# Patient Record
Sex: FEMALE | Race: WHITE | NOT HISPANIC OR LATINO | Employment: OTHER | URBAN - METROPOLITAN AREA
[De-identification: names, ages, dates, MRNs, and addresses within clinical notes are randomized per-mention and may not be internally consistent; named-entity substitution may affect disease eponyms.]

---

## 2023-03-07 DIAGNOSIS — J98.01 BRONCHOSPASM: Primary | ICD-10-CM

## 2023-03-07 RX ORDER — ALBUTEROL SULFATE 90 UG/1
2 AEROSOL, METERED RESPIRATORY (INHALATION) EVERY 6 HOURS PRN
COMMUNITY
Start: 2016-03-21 | End: 2023-03-07 | Stop reason: SDUPTHER

## 2023-03-08 RX ORDER — ALBUTEROL SULFATE 90 UG/1
2 AEROSOL, METERED RESPIRATORY (INHALATION) EVERY 6 HOURS PRN
Qty: 18 G | Refills: 0 | Status: SHIPPED | OUTPATIENT
Start: 2023-03-08 | End: 2023-03-09 | Stop reason: SDUPTHER

## 2023-03-09 DIAGNOSIS — J98.01 BRONCHOSPASM: ICD-10-CM

## 2023-03-09 PROBLEM — R91.8 PULMONARY NODULES: Status: ACTIVE | Noted: 2023-03-09

## 2023-03-09 PROBLEM — R49.0 HOARSENESS: Status: ACTIVE | Noted: 2023-03-09

## 2023-03-09 PROBLEM — E78.5 HYPERLIPIDEMIA: Status: ACTIVE | Noted: 2023-03-09

## 2023-03-09 PROBLEM — J44.9 COPD (CHRONIC OBSTRUCTIVE PULMONARY DISEASE) (MULTI): Status: ACTIVE | Noted: 2023-03-09

## 2023-03-09 PROBLEM — S99.921A TOE INJURY, RIGHT, INITIAL ENCOUNTER: Status: ACTIVE | Noted: 2023-03-09

## 2023-03-09 PROBLEM — S92.502A FRACTURE OF SECOND TOE, LEFT, CLOSED, INITIAL ENCOUNTER: Status: ACTIVE | Noted: 2023-03-09

## 2023-03-09 PROBLEM — F41.9 ANXIETY: Status: ACTIVE | Noted: 2023-03-09

## 2023-03-09 PROBLEM — I10 BENIGN ESSENTIAL HYPERTENSION: Status: ACTIVE | Noted: 2023-03-09

## 2023-03-09 RX ORDER — AMLODIPINE BESYLATE 2.5 MG/1
1 TABLET ORAL DAILY
COMMUNITY
Start: 2022-09-19 | End: 2023-06-06 | Stop reason: SDUPTHER

## 2023-03-09 RX ORDER — CITALOPRAM 20 MG/1
1 TABLET, FILM COATED ORAL DAILY
COMMUNITY
Start: 2017-12-04 | End: 2023-06-06 | Stop reason: SDUPTHER

## 2023-03-09 RX ORDER — CEFDINIR 300 MG/1
CAPSULE ORAL
COMMUNITY
Start: 2023-01-13 | End: 2023-06-06 | Stop reason: ALTCHOICE

## 2023-03-09 RX ORDER — DILTIAZEM HYDROCHLORIDE 120 MG/1
1 CAPSULE, EXTENDED RELEASE ORAL DAILY
COMMUNITY
Start: 2022-06-08 | End: 2023-06-06 | Stop reason: ALTCHOICE

## 2023-03-09 RX ORDER — BUDESONIDE AND FORMOTEROL FUMARATE DIHYDRATE 160; 4.5 UG/1; UG/1
AEROSOL RESPIRATORY (INHALATION) 2 TIMES DAILY
COMMUNITY
Start: 2016-03-21 | End: 2023-06-06 | Stop reason: SDUPTHER

## 2023-03-09 RX ORDER — LISINOPRIL 40 MG/1
1 TABLET ORAL DAILY
COMMUNITY
Start: 2020-02-07 | End: 2023-06-06 | Stop reason: SDUPTHER

## 2023-03-09 RX ORDER — PREDNISONE 20 MG/1
TABLET ORAL
COMMUNITY
Start: 2023-01-13 | End: 2023-06-06 | Stop reason: ALTCHOICE

## 2023-03-09 RX ORDER — LEVOCETIRIZINE DIHYDROCHLORIDE 5 MG/1
1 TABLET, FILM COATED ORAL DAILY
COMMUNITY
Start: 2020-12-08 | End: 2023-06-06 | Stop reason: ALTCHOICE

## 2023-03-09 RX ORDER — VIT C/E/ZN/COPPR/LUTEIN/ZEAXAN 250MG-90MG
CAPSULE ORAL
COMMUNITY
Start: 2020-12-08 | End: 2023-06-06 | Stop reason: ALTCHOICE

## 2023-03-11 RX ORDER — ALBUTEROL SULFATE 90 UG/1
2 AEROSOL, METERED RESPIRATORY (INHALATION) EVERY 6 HOURS PRN
Qty: 18 G | Refills: 2 | Status: SHIPPED | OUTPATIENT
Start: 2023-03-11 | End: 2023-05-14

## 2023-05-08 RX ORDER — AMLODIPINE BESYLATE 2.5 MG/1
TABLET ORAL
Qty: 100 TABLET | Refills: 2 | OUTPATIENT
Start: 2023-05-08

## 2023-05-14 DIAGNOSIS — J98.01 BRONCHOSPASM: ICD-10-CM

## 2023-05-14 RX ORDER — ALBUTEROL SULFATE 90 UG/1
AEROSOL, METERED RESPIRATORY (INHALATION)
Qty: 51 G | Refills: 2 | Status: SHIPPED | OUTPATIENT
Start: 2023-05-14 | End: 2023-06-06 | Stop reason: SDUPTHER

## 2023-06-06 ENCOUNTER — OFFICE VISIT (OUTPATIENT)
Dept: PRIMARY CARE | Facility: CLINIC | Age: 73
End: 2023-06-06
Payer: MEDICARE

## 2023-06-06 VITALS
SYSTOLIC BLOOD PRESSURE: 153 MMHG | HEART RATE: 87 BPM | BODY MASS INDEX: 21.05 KG/M2 | WEIGHT: 131 LBS | TEMPERATURE: 98.1 F | OXYGEN SATURATION: 90 % | HEIGHT: 66 IN | DIASTOLIC BLOOD PRESSURE: 79 MMHG

## 2023-06-06 DIAGNOSIS — J98.01 BRONCHOSPASM: ICD-10-CM

## 2023-06-06 DIAGNOSIS — F17.200 ENCOUNTER FOR SCREENING FOR MALIGNANT NEOPLASM OF LUNG IN CURRENT SMOKER WITH 30 PACK YEAR HISTORY OR GREATER: ICD-10-CM

## 2023-06-06 DIAGNOSIS — F41.9 ANXIETY: ICD-10-CM

## 2023-06-06 DIAGNOSIS — Z12.2 ENCOUNTER FOR SCREENING FOR MALIGNANT NEOPLASM OF LUNG IN CURRENT SMOKER WITH 30 PACK YEAR HISTORY OR GREATER: ICD-10-CM

## 2023-06-06 DIAGNOSIS — F17.200 SMOKER: ICD-10-CM

## 2023-06-06 DIAGNOSIS — I10 PRIMARY HYPERTENSION: ICD-10-CM

## 2023-06-06 DIAGNOSIS — Z00.00 ROUTINE GENERAL MEDICAL EXAMINATION AT HEALTH CARE FACILITY: Primary | ICD-10-CM

## 2023-06-06 PROCEDURE — 3078F DIAST BP <80 MM HG: CPT | Performed by: INTERNAL MEDICINE

## 2023-06-06 PROCEDURE — 1159F MED LIST DOCD IN RCRD: CPT | Performed by: INTERNAL MEDICINE

## 2023-06-06 PROCEDURE — 1170F FXNL STATUS ASSESSED: CPT | Performed by: INTERNAL MEDICINE

## 2023-06-06 PROCEDURE — G0439 PPPS, SUBSEQ VISIT: HCPCS | Performed by: INTERNAL MEDICINE

## 2023-06-06 PROCEDURE — 3077F SYST BP >= 140 MM HG: CPT | Performed by: INTERNAL MEDICINE

## 2023-06-06 RX ORDER — BUDESONIDE AND FORMOTEROL FUMARATE DIHYDRATE 160; 4.5 UG/1; UG/1
2 AEROSOL RESPIRATORY (INHALATION)
Qty: 3 EACH | Refills: 3 | Status: SHIPPED | OUTPATIENT
Start: 2023-06-06 | End: 2024-03-06 | Stop reason: ALTCHOICE

## 2023-06-06 RX ORDER — AMLODIPINE BESYLATE 2.5 MG/1
2.5 TABLET ORAL DAILY
Qty: 90 TABLET | Refills: 3 | Status: SHIPPED | OUTPATIENT
Start: 2023-06-06 | End: 2024-04-29

## 2023-06-06 RX ORDER — ALBUTEROL SULFATE 90 UG/1
AEROSOL, METERED RESPIRATORY (INHALATION)
Qty: 51 G | Refills: 3 | Status: SHIPPED | OUTPATIENT
Start: 2023-06-06 | End: 2023-10-29 | Stop reason: SDUPTHER

## 2023-06-06 RX ORDER — CITALOPRAM 20 MG/1
20 TABLET, FILM COATED ORAL DAILY
Qty: 90 TABLET | Refills: 3 | Status: SHIPPED | OUTPATIENT
Start: 2023-06-06 | End: 2024-06-02

## 2023-06-06 RX ORDER — LISINOPRIL 40 MG/1
40 TABLET ORAL DAILY
Qty: 90 TABLET | Refills: 3 | Status: SHIPPED | OUTPATIENT
Start: 2023-06-06 | End: 2024-04-29

## 2023-06-06 ASSESSMENT — ACTIVITIES OF DAILY LIVING (ADL)
BATHING: INDEPENDENT
GROCERY_SHOPPING: INDEPENDENT
DOING_HOUSEWORK: INDEPENDENT
TAKING_MEDICATION: INDEPENDENT
MANAGING_FINANCES: INDEPENDENT
DRESSING: INDEPENDENT

## 2023-06-06 ASSESSMENT — PATIENT HEALTH QUESTIONNAIRE - PHQ9
2. FEELING DOWN, DEPRESSED OR HOPELESS: NOT AT ALL
SUM OF ALL RESPONSES TO PHQ9 QUESTIONS 1 AND 2: 0
1. LITTLE INTEREST OR PLEASURE IN DOING THINGS: NOT AT ALL
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
## 2023-06-08 ENCOUNTER — TELEPHONE (OUTPATIENT)
Dept: PRIMARY CARE | Facility: CLINIC | Age: 73
End: 2023-06-08
Payer: MEDICARE

## 2023-06-08 DIAGNOSIS — Z87.891 PERSONAL HISTORY OF TOBACCO USE, PRESENTING HAZARDS TO HEALTH: ICD-10-CM

## 2023-06-08 DIAGNOSIS — F17.200 HEAVY SMOKER: Primary | ICD-10-CM

## 2023-06-08 DIAGNOSIS — Z12.2 ENCOUNTER FOR SCREENING FOR MALIGNANT NEOPLASM OF LUNG IN CURRENT SMOKER WITH 30 PACK YEAR HISTORY OR GREATER: ICD-10-CM

## 2023-06-08 DIAGNOSIS — F17.200 ENCOUNTER FOR SCREENING FOR MALIGNANT NEOPLASM OF LUNG IN CURRENT SMOKER WITH 30 PACK YEAR HISTORY OR GREATER: ICD-10-CM

## 2023-07-24 ENCOUNTER — TELEPHONE (OUTPATIENT)
Dept: PRIMARY CARE | Facility: CLINIC | Age: 73
End: 2023-07-24
Payer: MEDICARE

## 2023-07-24 NOTE — TELEPHONE ENCOUNTER
PT STATES THAT SHE HAD A CT DONE AND SHE WOULD LIKE THE RESULTS. SHE WANTED YOU TO KNOW THAT SHE HAVE A FEVER AND TIRED AND THINK SHE HAVE PNEUMONIA. THIS MESSAGE WAS FRIDAY. I CALLED AND SHE STATES THAT SHE IS BETTER AND SHE FEELS FINE SHE WAS IN THE GARDEN AND THE HEAT BUT AFTER SOME REST SHE FEELS BETTER

## 2023-10-27 ENCOUNTER — TELEPHONE (OUTPATIENT)
Dept: PRIMARY CARE | Facility: CLINIC | Age: 73
End: 2023-10-27
Payer: MEDICARE

## 2023-10-27 DIAGNOSIS — J98.01 BRONCHOSPASM: ICD-10-CM

## 2023-10-29 RX ORDER — ALBUTEROL SULFATE 90 UG/1
AEROSOL, METERED RESPIRATORY (INHALATION)
Qty: 51 G | Refills: 3 | Status: SHIPPED | OUTPATIENT
Start: 2023-10-29 | End: 2023-12-05 | Stop reason: SDUPTHER

## 2023-12-05 ENCOUNTER — OFFICE VISIT (OUTPATIENT)
Dept: PRIMARY CARE | Facility: CLINIC | Age: 73
End: 2023-12-05
Payer: MEDICARE

## 2023-12-05 VITALS
OXYGEN SATURATION: 91 % | SYSTOLIC BLOOD PRESSURE: 132 MMHG | HEART RATE: 85 BPM | BODY MASS INDEX: 20.72 KG/M2 | DIASTOLIC BLOOD PRESSURE: 65 MMHG | HEIGHT: 67 IN | TEMPERATURE: 98.4 F | WEIGHT: 132 LBS

## 2023-12-05 DIAGNOSIS — J43.9 PULMONARY EMPHYSEMA, UNSPECIFIED EMPHYSEMA TYPE (MULTI): Primary | ICD-10-CM

## 2023-12-05 DIAGNOSIS — I10 BENIGN ESSENTIAL HYPERTENSION: ICD-10-CM

## 2023-12-05 DIAGNOSIS — E78.5 HYPERLIPIDEMIA, UNSPECIFIED HYPERLIPIDEMIA TYPE: ICD-10-CM

## 2023-12-05 PROCEDURE — 99214 OFFICE O/P EST MOD 30 MIN: CPT | Performed by: INTERNAL MEDICINE

## 2023-12-05 PROCEDURE — 1159F MED LIST DOCD IN RCRD: CPT | Performed by: INTERNAL MEDICINE

## 2023-12-05 PROCEDURE — 3075F SYST BP GE 130 - 139MM HG: CPT | Performed by: INTERNAL MEDICINE

## 2023-12-05 PROCEDURE — 3078F DIAST BP <80 MM HG: CPT | Performed by: INTERNAL MEDICINE

## 2023-12-05 PROCEDURE — 1125F AMNT PAIN NOTED PAIN PRSNT: CPT | Performed by: INTERNAL MEDICINE

## 2023-12-05 RX ORDER — ALBUTEROL SULFATE 90 UG/1
2 AEROSOL, METERED RESPIRATORY (INHALATION) EVERY 6 HOURS PRN
Qty: 24 G | Refills: 3 | Status: SHIPPED | OUTPATIENT
Start: 2023-12-05 | End: 2024-03-06 | Stop reason: WASHOUT

## 2023-12-05 RX ORDER — ALBUTEROL SULFATE 90 UG/1
2 AEROSOL, METERED RESPIRATORY (INHALATION) EVERY 6 HOURS PRN
Qty: 8 G | Refills: 1 | Status: SHIPPED | OUTPATIENT
Start: 2023-12-05 | End: 2024-03-06 | Stop reason: SDUPTHER

## 2023-12-05 NOTE — PROGRESS NOTES
"PCP: Krista Pantoja MD   I have reviewed existing histories, notes, past medical history, surgical history, social history, family history, med list, allergy list, and immunization, and updated.    HPI:   Follow up and refills  Needs albuterol inhaler to be changed to ProAir brand, because it is not helping at all  Pt has over 50Pack year smoking hx, and has not seen a pulmonologist yet for COPD.  Had CT low dose lung ca screening in June    Lab Results   Component Value Date    WBC 12.0 (H) 01/13/2023    HGB 14.0 01/13/2023    HCT 42.4 01/13/2023     01/13/2023    CHOL 258 (H) 06/08/2022    TRIG 147 05/19/2020    HDL 62.3 06/08/2022    LDLDIRECT 168 (H) 06/08/2022    ALT 8 01/11/2023    AST 15 01/11/2023     (L) 01/11/2023    K 4.6 01/11/2023    CL 95 (L) 01/11/2023    CREATININE 0.40 (L) 01/11/2023    BUN 9 01/11/2023    CO2 28 01/11/2023     Physical exam:  /65   Pulse 85   Temp 36.9 °C (98.4 °F)   Ht 1.702 m (5' 7\")   Wt 59.9 kg (132 lb)   SpO2 91%   BMI 20.67 kg/m²    Heart appears regular and some premature beats  Lungs mild wheezing noted. No rales  Pulse ox noted  No leg edema.    Assessments/orders:   1. Pulmonary emphysema, unspecified emphysema type (CMS/HCC)  albuterol (ProAir HFA) 90 mcg/actuation inhaler, albuterol (ProAir HFA) 90 mcg/actuation inhaler, Referral to Pulmonology      2. Hyperlipidemia, unspecified hyperlipidemia type        3. Benign essential hypertension        Pt declined all vaccines at this time. Declined all other preventive tests at this time.               "

## 2023-12-19 ENCOUNTER — APPOINTMENT (OUTPATIENT)
Dept: PULMONOLOGY | Facility: HOSPITAL | Age: 73
End: 2023-12-19
Payer: MEDICARE

## 2024-01-07 ENCOUNTER — APPOINTMENT (OUTPATIENT)
Dept: RADIOLOGY | Facility: HOSPITAL | Age: 74
DRG: 156 | End: 2024-01-07
Payer: MEDICARE

## 2024-01-07 ENCOUNTER — HOSPITAL ENCOUNTER (INPATIENT)
Facility: HOSPITAL | Age: 74
LOS: 3 days | Discharge: HOME | DRG: 156 | End: 2024-01-10
Attending: STUDENT IN AN ORGANIZED HEALTH CARE EDUCATION/TRAINING PROGRAM | Admitting: INTERNAL MEDICINE
Payer: MEDICARE

## 2024-01-07 DIAGNOSIS — K11.20 PAROTIDITIS: Primary | ICD-10-CM

## 2024-01-07 LAB
ALBUMIN SERPL BCP-MCNC: 4.4 G/DL (ref 3.4–5)
ALP SERPL-CCNC: 66 U/L (ref 33–136)
ALT SERPL W P-5'-P-CCNC: 12 U/L (ref 7–45)
ANION GAP SERPL CALC-SCNC: 13 MMOL/L (ref 10–20)
APPEARANCE UR: CLEAR
AST SERPL W P-5'-P-CCNC: 11 U/L (ref 9–39)
BASOPHILS # BLD AUTO: 0.04 X10*3/UL (ref 0–0.1)
BASOPHILS NFR BLD AUTO: 0.3 %
BILIRUB SERPL-MCNC: 0.6 MG/DL (ref 0–1.2)
BILIRUB UR STRIP.AUTO-MCNC: NEGATIVE MG/DL
BUN SERPL-MCNC: 7 MG/DL (ref 6–23)
CALCIUM SERPL-MCNC: 8.9 MG/DL (ref 8.6–10.3)
CHLORIDE SERPL-SCNC: 97 MMOL/L (ref 98–107)
CO2 SERPL-SCNC: 31 MMOL/L (ref 21–32)
COLOR UR: YELLOW
CREAT SERPL-MCNC: 0.41 MG/DL (ref 0.5–1.05)
CRP SERPL-MCNC: 0.46 MG/DL
EOSINOPHIL # BLD AUTO: 0.07 X10*3/UL (ref 0–0.4)
EOSINOPHIL NFR BLD AUTO: 0.5 %
ERYTHROCYTE [DISTWIDTH] IN BLOOD BY AUTOMATED COUNT: 12.7 % (ref 11.5–14.5)
ERYTHROCYTE [SEDIMENTATION RATE] IN BLOOD BY WESTERGREN METHOD: 18 MM/H (ref 0–30)
ETHANOL SERPL-MCNC: <10 MG/DL
GFR SERPL CREATININE-BSD FRML MDRD: >90 ML/MIN/1.73M*2
GLUCOSE SERPL-MCNC: 87 MG/DL (ref 74–99)
GLUCOSE UR STRIP.AUTO-MCNC: NEGATIVE MG/DL
HCT VFR BLD AUTO: 46.5 % (ref 36–46)
HGB BLD-MCNC: 16 G/DL (ref 12–16)
HOLD SPECIMEN: 293
IMM GRANULOCYTES # BLD AUTO: 0.07 X10*3/UL (ref 0–0.5)
IMM GRANULOCYTES NFR BLD AUTO: 0.5 % (ref 0–0.9)
KETONES UR STRIP.AUTO-MCNC: ABNORMAL MG/DL
LACTATE SERPL-SCNC: 1.1 MMOL/L (ref 0.4–2)
LEUKOCYTE ESTERASE UR QL STRIP.AUTO: NEGATIVE
LYMPHOCYTES # BLD AUTO: 1.49 X10*3/UL (ref 0.8–3)
LYMPHOCYTES NFR BLD AUTO: 11.3 %
MCH RBC QN AUTO: 32.9 PG (ref 26–34)
MCHC RBC AUTO-ENTMCNC: 34.4 G/DL (ref 32–36)
MCV RBC AUTO: 96 FL (ref 80–100)
MONOCYTES # BLD AUTO: 1.16 X10*3/UL (ref 0.05–0.8)
MONOCYTES NFR BLD AUTO: 8.8 %
MUCOUS THREADS #/AREA URNS AUTO: NORMAL /LPF
NEUTROPHILS # BLD AUTO: 10.31 X10*3/UL (ref 1.6–5.5)
NEUTROPHILS NFR BLD AUTO: 78.6 %
NITRITE UR QL STRIP.AUTO: NEGATIVE
NRBC BLD-RTO: 0 /100 WBCS (ref 0–0)
PH UR STRIP.AUTO: 5 [PH]
PLATELET # BLD AUTO: 300 X10*3/UL (ref 150–450)
POTASSIUM SERPL-SCNC: 3.7 MMOL/L (ref 3.5–5.3)
PROT SERPL-MCNC: 7.7 G/DL (ref 6.4–8.2)
PROT UR STRIP.AUTO-MCNC: ABNORMAL MG/DL
RBC # BLD AUTO: 4.86 X10*6/UL (ref 4–5.2)
RBC # UR STRIP.AUTO: NEGATIVE /UL
RBC #/AREA URNS AUTO: NORMAL /HPF
SODIUM SERPL-SCNC: 137 MMOL/L (ref 136–145)
SP GR UR STRIP.AUTO: >1.06
SQUAMOUS #/AREA URNS AUTO: NORMAL /HPF
UROBILINOGEN UR STRIP.AUTO-MCNC: <2 MG/DL
WBC # BLD AUTO: 13.1 X10*3/UL (ref 4.4–11.3)
WBC #/AREA URNS AUTO: NORMAL /HPF

## 2024-01-07 PROCEDURE — 85652 RBC SED RATE AUTOMATED: CPT | Performed by: PHYSICIAN ASSISTANT

## 2024-01-07 PROCEDURE — 70491 CT SOFT TISSUE NECK W/DYE: CPT

## 2024-01-07 PROCEDURE — 1100000001 HC PRIVATE ROOM DAILY

## 2024-01-07 PROCEDURE — 2500000002 HC RX 250 W HCPCS SELF ADMINISTERED DRUGS (ALT 637 FOR MEDICARE OP, ALT 636 FOR OP/ED): Performed by: INTERNAL MEDICINE

## 2024-01-07 PROCEDURE — 87040 BLOOD CULTURE FOR BACTERIA: CPT | Mod: PORLAB | Performed by: PHYSICIAN ASSISTANT

## 2024-01-07 PROCEDURE — 70491 CT SOFT TISSUE NECK W/DYE: CPT | Performed by: RADIOLOGY

## 2024-01-07 PROCEDURE — 70486 CT MAXILLOFACIAL W/O DYE: CPT | Performed by: RADIOLOGY

## 2024-01-07 PROCEDURE — 84075 ASSAY ALKALINE PHOSPHATASE: CPT | Performed by: PHYSICIAN ASSISTANT

## 2024-01-07 PROCEDURE — 85025 COMPLETE CBC W/AUTO DIFF WBC: CPT | Performed by: PHYSICIAN ASSISTANT

## 2024-01-07 PROCEDURE — 83605 ASSAY OF LACTIC ACID: CPT | Performed by: PHYSICIAN ASSISTANT

## 2024-01-07 PROCEDURE — 70487 CT MAXILLOFACIAL W/DYE: CPT

## 2024-01-07 PROCEDURE — 96367 TX/PROPH/DG ADDL SEQ IV INF: CPT

## 2024-01-07 PROCEDURE — 2500000001 HC RX 250 WO HCPCS SELF ADMINISTERED DRUGS (ALT 637 FOR MEDICARE OP): Performed by: INTERNAL MEDICINE

## 2024-01-07 PROCEDURE — 96365 THER/PROPH/DIAG IV INF INIT: CPT

## 2024-01-07 PROCEDURE — 96375 TX/PRO/DX INJ NEW DRUG ADDON: CPT

## 2024-01-07 PROCEDURE — 36415 COLL VENOUS BLD VENIPUNCTURE: CPT | Performed by: PHYSICIAN ASSISTANT

## 2024-01-07 PROCEDURE — 2550000001 HC RX 255 CONTRASTS: Performed by: PHYSICIAN ASSISTANT

## 2024-01-07 PROCEDURE — 82077 ASSAY SPEC XCP UR&BREATH IA: CPT | Performed by: PHYSICIAN ASSISTANT

## 2024-01-07 PROCEDURE — 2500000004 HC RX 250 GENERAL PHARMACY W/ HCPCS (ALT 636 FOR OP/ED): Performed by: INTERNAL MEDICINE

## 2024-01-07 PROCEDURE — 99285 EMERGENCY DEPT VISIT HI MDM: CPT | Performed by: STUDENT IN AN ORGANIZED HEALTH CARE EDUCATION/TRAINING PROGRAM

## 2024-01-07 PROCEDURE — 99223 1ST HOSP IP/OBS HIGH 75: CPT | Performed by: INTERNAL MEDICINE

## 2024-01-07 PROCEDURE — 2500000004 HC RX 250 GENERAL PHARMACY W/ HCPCS (ALT 636 FOR OP/ED): Performed by: PHYSICIAN ASSISTANT

## 2024-01-07 PROCEDURE — 96376 TX/PRO/DX INJ SAME DRUG ADON: CPT

## 2024-01-07 PROCEDURE — 96374 THER/PROPH/DIAG INJ IV PUSH: CPT | Mod: 59

## 2024-01-07 PROCEDURE — 86140 C-REACTIVE PROTEIN: CPT | Performed by: PHYSICIAN ASSISTANT

## 2024-01-07 PROCEDURE — 81001 URINALYSIS AUTO W/SCOPE: CPT | Performed by: PHYSICIAN ASSISTANT

## 2024-01-07 PROCEDURE — S4991 NICOTINE PATCH NONLEGEND: HCPCS | Performed by: INTERNAL MEDICINE

## 2024-01-07 PROCEDURE — 94640 AIRWAY INHALATION TREATMENT: CPT

## 2024-01-07 RX ORDER — AMLODIPINE BESYLATE 2.5 MG/1
2.5 TABLET ORAL DAILY
Status: DISCONTINUED | OUTPATIENT
Start: 2024-01-07 | End: 2024-01-10 | Stop reason: HOSPADM

## 2024-01-07 RX ORDER — METRONIDAZOLE 500 MG/100ML
500 INJECTION, SOLUTION INTRAVENOUS EVERY 8 HOURS
Status: DISCONTINUED | OUTPATIENT
Start: 2024-01-07 | End: 2024-01-10 | Stop reason: HOSPADM

## 2024-01-07 RX ORDER — KETOROLAC TROMETHAMINE 30 MG/ML
15 INJECTION, SOLUTION INTRAMUSCULAR; INTRAVENOUS EVERY 6 HOURS
Status: COMPLETED | OUTPATIENT
Start: 2024-01-07 | End: 2024-01-08

## 2024-01-07 RX ORDER — IBUPROFEN 200 MG
1 TABLET ORAL DAILY
Status: DISCONTINUED | OUTPATIENT
Start: 2024-01-07 | End: 2024-01-10 | Stop reason: HOSPADM

## 2024-01-07 RX ORDER — CITALOPRAM 20 MG/1
20 TABLET, FILM COATED ORAL DAILY
Status: DISCONTINUED | OUTPATIENT
Start: 2024-01-07 | End: 2024-01-10 | Stop reason: HOSPADM

## 2024-01-07 RX ORDER — ONDANSETRON HYDROCHLORIDE 2 MG/ML
4 INJECTION, SOLUTION INTRAVENOUS EVERY 8 HOURS PRN
Status: DISCONTINUED | OUTPATIENT
Start: 2024-01-07 | End: 2024-01-10 | Stop reason: HOSPADM

## 2024-01-07 RX ORDER — KETOROLAC TROMETHAMINE 30 MG/ML
15 INJECTION, SOLUTION INTRAMUSCULAR; INTRAVENOUS ONCE
Status: COMPLETED | OUTPATIENT
Start: 2024-01-07 | End: 2024-01-07

## 2024-01-07 RX ORDER — LISINOPRIL 20 MG/1
40 TABLET ORAL DAILY
Status: DISCONTINUED | OUTPATIENT
Start: 2024-01-07 | End: 2024-01-10 | Stop reason: HOSPADM

## 2024-01-07 RX ORDER — ACETAMINOPHEN 160 MG/5ML
650 SOLUTION ORAL EVERY 4 HOURS PRN
Status: DISCONTINUED | OUTPATIENT
Start: 2024-01-07 | End: 2024-01-10 | Stop reason: HOSPADM

## 2024-01-07 RX ORDER — OXYCODONE HYDROCHLORIDE 5 MG/1
5 TABLET ORAL EVERY 4 HOURS PRN
Status: DISCONTINUED | OUTPATIENT
Start: 2024-01-07 | End: 2024-01-10 | Stop reason: HOSPADM

## 2024-01-07 RX ORDER — LORAZEPAM 2 MG/ML
1 INJECTION INTRAMUSCULAR EVERY 2 HOUR PRN
Status: DISCONTINUED | OUTPATIENT
Start: 2024-01-07 | End: 2024-01-10 | Stop reason: HOSPADM

## 2024-01-07 RX ORDER — CEFEPIME 1 G/50ML
2 INJECTION, SOLUTION INTRAVENOUS ONCE
Status: COMPLETED | OUTPATIENT
Start: 2024-01-07 | End: 2024-01-07

## 2024-01-07 RX ORDER — METRONIDAZOLE 500 MG/100ML
500 INJECTION, SOLUTION INTRAVENOUS ONCE
Status: COMPLETED | OUTPATIENT
Start: 2024-01-07 | End: 2024-01-07

## 2024-01-07 RX ORDER — TALC
3 POWDER (GRAM) TOPICAL DAILY
Status: DISCONTINUED | OUTPATIENT
Start: 2024-01-07 | End: 2024-01-10 | Stop reason: HOSPADM

## 2024-01-07 RX ORDER — ONDANSETRON 4 MG/1
4 TABLET, FILM COATED ORAL EVERY 8 HOURS PRN
Status: DISCONTINUED | OUTPATIENT
Start: 2024-01-07 | End: 2024-01-10 | Stop reason: HOSPADM

## 2024-01-07 RX ORDER — THIAMINE HYDROCHLORIDE 100 MG/ML
100 INJECTION, SOLUTION INTRAMUSCULAR; INTRAVENOUS DAILY
Status: COMPLETED | OUTPATIENT
Start: 2024-01-07 | End: 2024-01-09

## 2024-01-07 RX ORDER — ACETAMINOPHEN 650 MG/1
650 SUPPOSITORY RECTAL EVERY 4 HOURS PRN
Status: DISCONTINUED | OUTPATIENT
Start: 2024-01-07 | End: 2024-01-10 | Stop reason: HOSPADM

## 2024-01-07 RX ORDER — ACETAMINOPHEN 325 MG/1
650 TABLET ORAL EVERY 4 HOURS PRN
Status: DISCONTINUED | OUTPATIENT
Start: 2024-01-07 | End: 2024-01-10 | Stop reason: HOSPADM

## 2024-01-07 RX ORDER — CEFTRIAXONE 1 G/50ML
1 INJECTION, SOLUTION INTRAVENOUS EVERY 24 HOURS
Status: DISCONTINUED | OUTPATIENT
Start: 2024-01-07 | End: 2024-01-10 | Stop reason: HOSPADM

## 2024-01-07 RX ORDER — MORPHINE SULFATE 4 MG/ML
4 INJECTION, SOLUTION INTRAMUSCULAR; INTRAVENOUS ONCE
Status: COMPLETED | OUTPATIENT
Start: 2024-01-07 | End: 2024-01-07

## 2024-01-07 RX ORDER — ENOXAPARIN SODIUM 100 MG/ML
40 INJECTION SUBCUTANEOUS EVERY 24 HOURS
Status: DISCONTINUED | OUTPATIENT
Start: 2024-01-07 | End: 2024-01-10 | Stop reason: HOSPADM

## 2024-01-07 RX ORDER — FLUTICASONE FUROATE AND VILANTEROL 200; 25 UG/1; UG/1
1 POWDER RESPIRATORY (INHALATION)
Status: DISCONTINUED | OUTPATIENT
Start: 2024-01-07 | End: 2024-01-10 | Stop reason: HOSPADM

## 2024-01-07 RX ORDER — LORAZEPAM 2 MG/ML
0.5 INJECTION INTRAMUSCULAR EVERY 2 HOUR PRN
Status: DISCONTINUED | OUTPATIENT
Start: 2024-01-07 | End: 2024-01-10 | Stop reason: HOSPADM

## 2024-01-07 RX ORDER — POLYETHYLENE GLYCOL 3350 17 G/17G
17 POWDER, FOR SOLUTION ORAL DAILY PRN
Status: DISCONTINUED | OUTPATIENT
Start: 2024-01-07 | End: 2024-01-10 | Stop reason: HOSPADM

## 2024-01-07 RX ORDER — OXYCODONE HYDROCHLORIDE 5 MG/1
7.5 TABLET ORAL EVERY 4 HOURS PRN
Status: DISCONTINUED | OUTPATIENT
Start: 2024-01-07 | End: 2024-01-08

## 2024-01-07 RX ADMIN — Medication 3 MG: at 19:30

## 2024-01-07 RX ADMIN — FLUTICASONE FUROATE AND VILANTEROL TRIFENATATE 1 PUFF: 200; 25 POWDER RESPIRATORY (INHALATION) at 22:23

## 2024-01-07 RX ADMIN — METRONIDAZOLE 500 MG: 500 INJECTION, SOLUTION INTRAVENOUS at 20:45

## 2024-01-07 RX ADMIN — METRONIDAZOLE 500 MG: 500 INJECTION, SOLUTION INTRAVENOUS at 12:40

## 2024-01-07 RX ADMIN — LORAZEPAM 1 MG: 2 INJECTION, SOLUTION INTRAMUSCULAR; INTRAVENOUS at 11:19

## 2024-01-07 RX ADMIN — KETOROLAC TROMETHAMINE 15 MG: 30 INJECTION, SOLUTION INTRAMUSCULAR at 19:30

## 2024-01-07 RX ADMIN — HYDROMORPHONE HYDROCHLORIDE 0.5 MG: 0.5 INJECTION, SOLUTION INTRAMUSCULAR; INTRAVENOUS; SUBCUTANEOUS at 13:22

## 2024-01-07 RX ADMIN — CITALOPRAM HYDROBROMIDE 20 MG: 20 TABLET ORAL at 19:30

## 2024-01-07 RX ADMIN — THIAMINE HYDROCHLORIDE 100 MG: 100 INJECTION, SOLUTION INTRAMUSCULAR; INTRAVENOUS at 09:00

## 2024-01-07 RX ADMIN — KETOROLAC TROMETHAMINE 15 MG: 30 INJECTION, SOLUTION INTRAMUSCULAR at 13:22

## 2024-01-07 RX ADMIN — LISINOPRIL 40 MG: 20 TABLET ORAL at 19:30

## 2024-01-07 RX ADMIN — NICOTINE 1 PATCH: 14 PATCH, EXTENDED RELEASE TRANSDERMAL at 14:25

## 2024-01-07 RX ADMIN — KETOROLAC TROMETHAMINE 15 MG: 30 INJECTION, SOLUTION INTRAMUSCULAR at 07:09

## 2024-01-07 RX ADMIN — AMLODIPINE BESYLATE 2.5 MG: 2.5 TABLET ORAL at 19:30

## 2024-01-07 RX ADMIN — IOHEXOL 75 ML: 350 INJECTION, SOLUTION INTRAVENOUS at 10:28

## 2024-01-07 RX ADMIN — MORPHINE SULFATE 4 MG: 4 INJECTION, SOLUTION INTRAMUSCULAR; INTRAVENOUS at 07:09

## 2024-01-07 RX ADMIN — CEFEPIME 2 G: 1 INJECTION, SOLUTION INTRAVENOUS at 14:20

## 2024-01-07 RX ADMIN — CEFTRIAXONE SODIUM 1 G: 1 INJECTION, SOLUTION INTRAVENOUS at 19:31

## 2024-01-07 SDOH — SOCIAL STABILITY: SOCIAL INSECURITY: WITHIN THE LAST YEAR, HAVE YOU BEEN AFRAID OF YOUR PARTNER OR EX-PARTNER?: PATIENT DECLINED

## 2024-01-07 SDOH — SOCIAL STABILITY: SOCIAL NETWORK: IN A TYPICAL WEEK, HOW MANY TIMES DO YOU TALK ON THE PHONE WITH FAMILY, FRIENDS, OR NEIGHBORS?: PATIENT DECLINED

## 2024-01-07 SDOH — SOCIAL STABILITY: SOCIAL NETWORK
DO YOU BELONG TO ANY CLUBS OR ORGANIZATIONS SUCH AS CHURCH GROUPS UNIONS, FRATERNAL OR ATHLETIC GROUPS, OR SCHOOL GROUPS?: PATIENT DECLINED

## 2024-01-07 SDOH — SOCIAL STABILITY: SOCIAL INSECURITY
WITHIN THE LAST YEAR, HAVE YOU BEEN KICKED, HIT, SLAPPED, OR OTHERWISE PHYSICALLY HURT BY YOUR PARTNER OR EX-PARTNER?: PATIENT DECLINED

## 2024-01-07 SDOH — SOCIAL STABILITY: SOCIAL INSECURITY: HAVE YOU HAD THOUGHTS OF HARMING ANYONE ELSE?: NO

## 2024-01-07 SDOH — SOCIAL STABILITY: SOCIAL NETWORK: HOW OFTEN DO YOU ATTENT MEETINGS OF THE CLUB OR ORGANIZATION YOU BELONG TO?: PATIENT DECLINED

## 2024-01-07 SDOH — HEALTH STABILITY: MENTAL HEALTH: HOW OFTEN DO YOU HAVE 6 OR MORE DRINKS ON ONE OCCASION?: MONTHLY

## 2024-01-07 SDOH — SOCIAL STABILITY: SOCIAL INSECURITY
WITHIN THE LAST YEAR, HAVE TO BEEN RAPED OR FORCED TO HAVE ANY KIND OF SEXUAL ACTIVITY BY YOUR PARTNER OR EX-PARTNER?: PATIENT DECLINED

## 2024-01-07 SDOH — ECONOMIC STABILITY: FOOD INSECURITY: WITHIN THE PAST 12 MONTHS, THE FOOD YOU BOUGHT JUST DIDN'T LAST AND YOU DIDN'T HAVE MONEY TO GET MORE.: PATIENT DECLINED

## 2024-01-07 SDOH — SOCIAL STABILITY: SOCIAL INSECURITY: WERE YOU ABLE TO COMPLETE ALL THE BEHAVIORAL HEALTH SCREENINGS?: YES

## 2024-01-07 SDOH — SOCIAL STABILITY: SOCIAL INSECURITY: ARE YOU OR HAVE YOU BEEN THREATENED OR ABUSED PHYSICALLY, EMOTIONALLY, OR SEXUALLY BY ANYONE?: NO

## 2024-01-07 SDOH — SOCIAL STABILITY: SOCIAL INSECURITY: DO YOU FEEL UNSAFE GOING BACK TO THE PLACE WHERE YOU ARE LIVING?: NO

## 2024-01-07 SDOH — HEALTH STABILITY: MENTAL HEALTH: HOW OFTEN DO YOU HAVE A DRINK CONTAINING ALCOHOL?: 4 OR MORE TIMES A WEEK

## 2024-01-07 SDOH — HEALTH STABILITY: MENTAL HEALTH: HOW MANY STANDARD DRINKS CONTAINING ALCOHOL DO YOU HAVE ON A TYPICAL DAY?: 3 OR 4

## 2024-01-07 SDOH — HEALTH STABILITY: PHYSICAL HEALTH: ON AVERAGE, HOW MANY MINUTES DO YOU ENGAGE IN EXERCISE AT THIS LEVEL?: 0 MIN

## 2024-01-07 SDOH — SOCIAL STABILITY: SOCIAL INSECURITY
WITHIN THE LAST YEAR, HAVE YOU BEEN HUMILIATED OR EMOTIONALLY ABUSED IN OTHER WAYS BY YOUR PARTNER OR EX-PARTNER?: PATIENT DECLINED

## 2024-01-07 SDOH — SOCIAL STABILITY: SOCIAL INSECURITY: DO YOU FEEL ANYONE HAS EXPLOITED OR TAKEN ADVANTAGE OF YOU FINANCIALLY OR OF YOUR PERSONAL PROPERTY?: NO

## 2024-01-07 SDOH — SOCIAL STABILITY: SOCIAL INSECURITY: ABUSE: ADULT

## 2024-01-07 SDOH — SOCIAL STABILITY: SOCIAL NETWORK: HOW OFTEN DO YOU ATTEND CHURCH OR RELIGIOUS SERVICES?: PATIENT DECLINED

## 2024-01-07 SDOH — SOCIAL STABILITY: SOCIAL INSECURITY: ARE THERE ANY APPARENT SIGNS OF INJURIES/BEHAVIORS THAT COULD BE RELATED TO ABUSE/NEGLECT?: NO

## 2024-01-07 SDOH — HEALTH STABILITY: PHYSICAL HEALTH: ON AVERAGE, HOW MANY DAYS PER WEEK DO YOU ENGAGE IN MODERATE TO STRENUOUS EXERCISE (LIKE A BRISK WALK)?: 0 DAYS

## 2024-01-07 SDOH — SOCIAL STABILITY: SOCIAL NETWORK: ARE YOU MARRIED, WIDOWED, DIVORCED, SEPARATED, NEVER MARRIED, OR LIVING WITH A PARTNER?: PATIENT DECLINED

## 2024-01-07 SDOH — SOCIAL STABILITY: SOCIAL NETWORK: HOW OFTEN DO YOU GET TOGETHER WITH FRIENDS OR RELATIVES?: PATIENT DECLINED

## 2024-01-07 SDOH — SOCIAL STABILITY: SOCIAL INSECURITY: HAS ANYONE EVER THREATENED TO HURT YOUR FAMILY OR YOUR PETS?: NO

## 2024-01-07 SDOH — ECONOMIC STABILITY: FOOD INSECURITY: WITHIN THE PAST 12 MONTHS, YOU WORRIED THAT YOUR FOOD WOULD RUN OUT BEFORE YOU GOT MONEY TO BUY MORE.: PATIENT DECLINED

## 2024-01-07 SDOH — HEALTH STABILITY: MENTAL HEALTH
STRESS IS WHEN SOMEONE FEELS TENSE, NERVOUS, ANXIOUS, OR CAN'T SLEEP AT NIGHT BECAUSE THEIR MIND IS TROUBLED. HOW STRESSED ARE YOU?: PATIENT DECLINED

## 2024-01-07 SDOH — ECONOMIC STABILITY: INCOME INSECURITY
IN THE PAST 12 MONTHS, HAS THE ELECTRIC, GAS, OIL, OR WATER COMPANY THREATENED TO SHUT OFF SERVICE IN YOUR HOME?: PATIENT DECLINED

## 2024-01-07 SDOH — SOCIAL STABILITY: SOCIAL INSECURITY: DOES ANYONE TRY TO KEEP YOU FROM HAVING/CONTACTING OTHER FRIENDS OR DOING THINGS OUTSIDE YOUR HOME?: NO

## 2024-01-07 ASSESSMENT — LIFESTYLE VARIABLES
HOW MANY STANDARD DRINKS CONTAINING ALCOHOL DO YOU HAVE ON A TYPICAL DAY: 3 OR 4
HOW OFTEN DURING THE LAST YEAR HAVE YOU HAD A FEELING OF GUILT OR REMORSE AFTER DRINKING: NEVER
ORIENTATION AND CLOUDING OF SENSORIUM: ORIENTED AND CAN DO SERIAL ADDITIONS
PAROXYSMAL SWEATS: NO SWEAT VISIBLE
EVER FELT BAD OR GUILTY ABOUT YOUR DRINKING: YES
NAUSEA AND VOMITING: NO NAUSEA AND NO VOMITING
NAUSEA AND VOMITING: NO NAUSEA AND NO VOMITING
AUDITORY DISTURBANCES: NOT PRESENT
EVER HAD A DRINK FIRST THING IN THE MORNING TO STEADY YOUR NERVES TO GET RID OF A HANGOVER: NO
TOTAL SCORE: 0
TREMOR: NO TREMOR
HAS A RELATIVE, FRIEND, DOCTOR, OR ANOTHER HEALTH PROFESSIONAL EXPRESSED CONCERN ABOUT YOUR DRINKING OR SUGGESTED YOU CUT DOWN: YES, BUT NOT IN THE LAST YEAR
TOTAL SCORE: 8
VISUAL DISTURBANCES: NOT PRESENT
HEADACHE, FULLNESS IN HEAD: VERY MILD
VISUAL DISTURBANCES: NOT PRESENT
ANXIETY: NO ANXIETY, AT EASE
VISUAL DISTURBANCES: NOT PRESENT
HEADACHE, FULLNESS IN HEAD: NOT PRESENT
AUDITORY DISTURBANCES: NOT PRESENT
PAROXYSMAL SWEATS: NO SWEAT VISIBLE
VISUAL DISTURBANCES: NOT PRESENT
NAUSEA AND VOMITING: NO NAUSEA AND NO VOMITING
AGITATION: NORMAL ACTIVITY
REASON UNABLE TO ASSESS: NO
TOTAL SCORE: 8
PAROXYSMAL SWEATS: BARELY PERCEPTIBLE SWEATING, PALMS MOIST
TOTAL SCORE: 0
PAROXYSMAL SWEATS: NO SWEAT VISIBLE
ANXIETY: NO ANXIETY, AT EASE
HOW OFTEN DURING THE LAST YEAR HAVE YOU FAILED TO DO WHAT WAS NORMALLY EXPECTED FROM YOU BECAUSE OF DRINKING: NEVER
VISUAL DISTURBANCES: NOT PRESENT
HEADACHE, FULLNESS IN HEAD: NOT PRESENT
AUDITORY DISTURBANCES: NOT PRESENT
PAROXYSMAL SWEATS: NO SWEAT VISIBLE
TREMOR: NO TREMOR
HAVE YOU EVER FELT YOU SHOULD CUT DOWN ON YOUR DRINKING: YES
TREMOR: 3
AGITATION: NORMAL ACTIVITY
TREMOR: NO TREMOR
TOTAL SCORE: 0
ORIENTATION AND CLOUDING OF SENSORIUM: ORIENTED AND CAN DO SERIAL ADDITIONS
AGITATION: SOMEWHAT MORE THAN NORMAL ACTIVITY
NAUSEA AND VOMITING: NO NAUSEA AND NO VOMITING
AUDITORY DISTURBANCES: NOT PRESENT
TOTAL SCORE: 0
AGITATION: NORMAL ACTIVITY
ORIENTATION AND CLOUDING OF SENSORIUM: ORIENTED AND CAN DO SERIAL ADDITIONS
AGITATION: NORMAL ACTIVITY
ANXIETY: NO ANXIETY, AT EASE
TOTAL SCORE: 4
HEADACHE, FULLNESS IN HEAD: NOT PRESENT
ANXIETY: NO ANXIETY, AT EASE
VISUAL DISTURBANCES: NOT PRESENT
HEADACHE, FULLNESS IN HEAD: VERY MILD
NAUSEA AND VOMITING: MILD NAUSEA WITH NO VOMITING
SKIP TO QUESTIONS 9-10: 0
ORIENTATION AND CLOUDING OF SENSORIUM: ORIENTED AND CAN DO SERIAL ADDITIONS
ORIENTATION AND CLOUDING OF SENSORIUM: ORIENTED AND CAN DO SERIAL ADDITIONS
AUDITORY DISTURBANCES: NOT PRESENT
ORIENTATION AND CLOUDING OF SENSORIUM: ORIENTED AND CAN DO SERIAL ADDITIONS
AUDITORY DISTURBANCES: NOT PRESENT
AUDIT TOTAL SCORE: 9
ANXIETY: NO ANXIETY, AT EASE
AGITATION: NORMAL ACTIVITY
TREMOR: 3
HOW OFTEN DO YOU HAVE 6 OR MORE DRINKS ON ONE OCCASION: MONTHLY
SKIP TO QUESTIONS 9-10: 0
HAVE PEOPLE ANNOYED YOU BY CRITICIZING YOUR DRINKING: YES
HOW OFTEN DURING THE LAST YEAR HAVE YOU FOUND THAT YOU WERE NOT ABLE TO STOP DRINKING ONCE YOU HAD STARTED: NEVER
AUDIT-C TOTAL SCORE: 7
AGITATION: NORMAL ACTIVITY
NAUSEA AND VOMITING: MILD NAUSEA WITH NO VOMITING
AUDIT-C TOTAL SCORE: 7
NAUSEA AND VOMITING: MILD NAUSEA WITH NO VOMITING
AUDITORY DISTURBANCES: NOT PRESENT
ANXIETY: NO ANXIETY, AT EASE
HAVE YOU OR SOMEONE ELSE BEEN INJURED AS A RESULT OF YOUR DRINKING: NO
AGITATION: SOMEWHAT MORE THAN NORMAL ACTIVITY
PAROXYSMAL SWEATS: BARELY PERCEPTIBLE SWEATING, PALMS MOIST
ANXIETY: MILDLY ANXIOUS
AUDITORY DISTURBANCES: NOT PRESENT
VISUAL DISTURBANCES: NOT PRESENT
TREMOR: 3
AUDIT TOTAL SCORE: 2
HEADACHE, FULLNESS IN HEAD: NOT PRESENT
HEADACHE, FULLNESS IN HEAD: NOT PRESENT
TREMOR: NO TREMOR
TREMOR: 2
HOW OFTEN DURING THE LAST YEAR HAVE YOU NEEDED AN ALCOHOLIC DRINK FIRST THING IN THE MORNING TO GET YOURSELF GOING AFTER A NIGHT OF HEAVY DRINKING: NEVER
NAUSEA AND VOMITING: NO NAUSEA AND NO VOMITING
HOW OFTEN DURING THE LAST YEAR HAVE YOU BEEN UNABLE TO REMEMBER WHAT HAPPENED THE NIGHT BEFORE BECAUSE YOU HAD BEEN DRINKING: NEVER
ORIENTATION AND CLOUDING OF SENSORIUM: ORIENTED AND CAN DO SERIAL ADDITIONS
VISUAL DISTURBANCES: NOT PRESENT
ORIENTATION AND CLOUDING OF SENSORIUM: ORIENTED AND CAN DO SERIAL ADDITIONS
ANXIETY: MILDLY ANXIOUS
PAROXYSMAL SWEATS: NO SWEAT VISIBLE
AUDIT-C TOTAL SCORE: 7
HEADACHE, FULLNESS IN HEAD: NOT PRESENT
TOTAL SCORE: 2
PAROXYSMAL SWEATS: NO SWEAT VISIBLE
HOW OFTEN DO YOU HAVE A DRINK CONTAINING ALCOHOL: 4 OR MORE TIMES A WEEK

## 2024-01-07 ASSESSMENT — COGNITIVE AND FUNCTIONAL STATUS - GENERAL
CLIMB 3 TO 5 STEPS WITH RAILING: A LITTLE
DRESSING REGULAR LOWER BODY CLOTHING: A LITTLE
WALKING IN HOSPITAL ROOM: A LITTLE
MOBILITY SCORE: 22
DAILY ACTIVITIY SCORE: 23
CLIMB 3 TO 5 STEPS WITH RAILING: A LITTLE
PATIENT BASELINE BEDBOUND: NO
MOBILITY SCORE: 22
DAILY ACTIVITIY SCORE: 23
WALKING IN HOSPITAL ROOM: A LITTLE
DRESSING REGULAR LOWER BODY CLOTHING: A LITTLE

## 2024-01-07 ASSESSMENT — PAIN SCALES - GENERAL
PAINLEVEL_OUTOF10: 10 - WORST POSSIBLE PAIN
PAINLEVEL_OUTOF10: 9
PAINLEVEL_OUTOF10: 3
PAINLEVEL_OUTOF10: 9
PAINLEVEL_OUTOF10: 0 - NO PAIN
PAINLEVEL_OUTOF10: 9
PAINLEVEL_OUTOF10: 0 - NO PAIN

## 2024-01-07 ASSESSMENT — ACTIVITIES OF DAILY LIVING (ADL)
WALKS IN HOME: INDEPENDENT
BATHING: INDEPENDENT
FEEDING YOURSELF: INDEPENDENT
GROOMING: INDEPENDENT
ASSISTIVE_DEVICE: EYEGLASSES
ADEQUATE_TO_COMPLETE_ADL: NO
LACK_OF_TRANSPORTATION: PATIENT DECLINED
DRESSING YOURSELF: INDEPENDENT
HEARING - RIGHT EAR: FUNCTIONAL
PATIENT'S MEMORY ADEQUATE TO SAFELY COMPLETE DAILY ACTIVITIES?: YES
JUDGMENT_ADEQUATE_SAFELY_COMPLETE_DAILY_ACTIVITIES: YES
HEARING - LEFT EAR: FUNCTIONAL
TOILETING: INDEPENDENT

## 2024-01-07 ASSESSMENT — COLUMBIA-SUICIDE SEVERITY RATING SCALE - C-SSRS
6. HAVE YOU EVER DONE ANYTHING, STARTED TO DO ANYTHING, OR PREPARED TO DO ANYTHING TO END YOUR LIFE?: NO
2. HAVE YOU ACTUALLY HAD ANY THOUGHTS OF KILLING YOURSELF?: NO
1. IN THE PAST MONTH, HAVE YOU WISHED YOU WERE DEAD OR WISHED YOU COULD GO TO SLEEP AND NOT WAKE UP?: NO

## 2024-01-07 ASSESSMENT — PAIN - FUNCTIONAL ASSESSMENT
PAIN_FUNCTIONAL_ASSESSMENT: 0-10
PAIN_FUNCTIONAL_ASSESSMENT: 0-10

## 2024-01-07 ASSESSMENT — PATIENT HEALTH QUESTIONNAIRE - PHQ9
2. FEELING DOWN, DEPRESSED OR HOPELESS: NOT AT ALL
1. LITTLE INTEREST OR PLEASURE IN DOING THINGS: NOT AT ALL
SUM OF ALL RESPONSES TO PHQ9 QUESTIONS 1 & 2: 0

## 2024-01-07 NOTE — H&P
History Of Present Illness  Rosa Ch is a 73 y.o. female with a PMH of COPD, HLD and HTN presenting with face pain.     Patient complains of left facial swelling for the past week.  Patient was seen in urgent care 5 days ago for the swelling and given prescription for doxycycline and prednisone.  She says the pain and swelling have gotten worse most specifically over the last night.  Patient has associated fevers and chills.  Says the pain is 10 of 10, aching, persistent.  Denies any chest pain, shortness of breath, lightheadedness, dizziness, nausea, vomiting, diarrhea, constipation, dysuria, polyuria.  Patient has a baseline cough that is no worse than typical.        Past Medical History  She has a past medical history of COPD (chronic obstructive pulmonary disease) (CMS/HCC) and Hypertension.    Surgical History  She has a past surgical history that includes Tubal ligation (03/16/2016) and Facial cosmetic surgery.     Social History  She reports that she has been smoking cigarettes. She has been smoking an average of 1 pack per day. She has never used smokeless tobacco. She reports current alcohol use of about 12.0 standard drinks of alcohol per week. She reports that she does not use drugs.    Family History  Family History   Problem Relation Name Age of Onset    Breast cancer Mother      Other (bladder cancer) Mother      Hepatitis Sister      Arthritis Sister          Allergies  Peanut, Penicillins, and Sulfa (sulfonamide antibiotics)    Code Status  No Order     A Comprehensive greater than 10 system review of systems was carried out.  Pertinent positives and negatives are noted above.  Otherwise negative for contributory information.       Last Recorded Vitals  BP (!) 155/95   Pulse 96   Temp 37.4 °C (99.3 °F) (Temporal)   Resp 16   Wt 59.4 kg (131 lb)   SpO2 100%      Physical Exam  Constitutional:       Appearance: Normal appearance.   HENT:      Head: Normocephalic and atraumatic.       Comments: Edema and tenderness left maxillary and buccal region around the parotid location.  Mild trismus, not able to open mouth completely.  Head normocephalic atraumatic, extraocular movements intact, pupils equal round reactive to light, mucous membranes are moist and pink, normal facial symmetry. No pharyngeal erythema, edema, exudates. Uvula is midline with no stridor, drooling. No induration the floor of the mouth.     Mouth/Throat:      Mouth: Mucous membranes are moist.      Pharynx: Oropharynx is clear.   Eyes:      Extraocular Movements: Extraocular movements intact.      Pupils: Pupils are equal, round, and reactive to light.   Cardiovascular:      Rate and Rhythm: Regular rhythm. Tachycardia present.      Heart sounds: Normal heart sounds. No murmur heard.     No friction rub. No gallop.   Pulmonary:      Effort: Pulmonary effort is normal. No respiratory distress.      Breath sounds: No stridor. Wheezing present. No rhonchi or rales.      Comments: Poor air movement B/L, scant expiratory wheeze B/L   Abdominal:      General: Abdomen is flat. Bowel sounds are normal. There is no distension.      Palpations: Abdomen is soft.      Tenderness: There is no abdominal tenderness.   Musculoskeletal:         General: No swelling.      Cervical back: No rigidity.   Skin:     General: Skin is warm and dry.   Neurological:      General: No focal deficit present.      Mental Status: She is alert.   Psychiatric:         Mood and Affect: Mood normal.         Behavior: Behavior normal.          Relevant Results  Results for orders placed or performed during the hospital encounter of 01/07/24 (from the past 24 hour(s))   CBC and Auto Differential   Result Value Ref Range    WBC 13.1 (H) 4.4 - 11.3 x10*3/uL    nRBC 0.0 0.0 - 0.0 /100 WBCs    RBC 4.86 4.00 - 5.20 x10*6/uL    Hemoglobin 16.0 12.0 - 16.0 g/dL    Hematocrit 46.5 (H) 36.0 - 46.0 %    MCV 96 80 - 100 fL    MCH 32.9 26.0 - 34.0 pg    MCHC 34.4 32.0 - 36.0  g/dL    RDW 12.7 11.5 - 14.5 %    Platelets 300 150 - 450 x10*3/uL    Neutrophils % 78.6 40.0 - 80.0 %    Immature Granulocytes %, Automated 0.5 0.0 - 0.9 %    Lymphocytes % 11.3 13.0 - 44.0 %    Monocytes % 8.8 2.0 - 10.0 %    Eosinophils % 0.5 0.0 - 6.0 %    Basophils % 0.3 0.0 - 2.0 %    Neutrophils Absolute 10.31 (H) 1.60 - 5.50 x10*3/uL    Immature Granulocytes Absolute, Automated 0.07 0.00 - 0.50 x10*3/uL    Lymphocytes Absolute 1.49 0.80 - 3.00 x10*3/uL    Monocytes Absolute 1.16 (H) 0.05 - 0.80 x10*3/uL    Eosinophils Absolute 0.07 0.00 - 0.40 x10*3/uL    Basophils Absolute 0.04 0.00 - 0.10 x10*3/uL   Comprehensive Metabolic Panel   Result Value Ref Range    Glucose 87 74 - 99 mg/dL    Sodium 137 136 - 145 mmol/L    Potassium 3.7 3.5 - 5.3 mmol/L    Chloride 97 (L) 98 - 107 mmol/L    Bicarbonate 31 21 - 32 mmol/L    Anion Gap 13 10 - 20 mmol/L    Urea Nitrogen 7 6 - 23 mg/dL    Creatinine 0.41 (L) 0.50 - 1.05 mg/dL    eGFR >90 >60 mL/min/1.73m*2    Calcium 8.9 8.6 - 10.3 mg/dL    Albumin 4.4 3.4 - 5.0 g/dL    Alkaline Phosphatase 66 33 - 136 U/L    Total Protein 7.7 6.4 - 8.2 g/dL    AST 11 9 - 39 U/L    Bilirubin, Total 0.6 0.0 - 1.2 mg/dL    ALT 12 7 - 45 U/L   Lactate   Result Value Ref Range    Lactate 1.1 0.4 - 2.0 mmol/L   Sedimentation Rate   Result Value Ref Range    Sedimentation Rate 18 0 - 30 mm/h   C-reactive protein   Result Value Ref Range    C-Reactive Protein 0.46 <1.00 mg/dL   Ethanol   Result Value Ref Range    Alcohol <10 <=10 mg/dL      CT facial bones w IV contrast    Result Date: 1/7/2024  Interpreted By:  Hoots, Sowmya, STUDY: CT MAXILLOFACIAL BONES W IV CONTRAST; CT SOFT TISSUE NECK W IV CONTRAST;  1/7/2024 10:53 am; 1/7/2024 10:55 am   INDICATION: Signs/Symptoms:left facial and neck swelling.   COMPARISON: None.   ACCESSION NUMBER(S): [id number]; [id number]   ORDERING CLINICIAN: MAYDA GORDON   TECHNIQUE: Axial CT images of the neck were obtained and maxillofacial region. The  patient received 75 mL Omnipaque 350 intravenous contrast agent. The images were reformatted in angled axial, coronal and sagittal planes.   FINDINGS: Oral Cavity, Pharynx and Larynx:  Evaluation oral cavity is limited by streak artifact from dental hardware.  The nasopharyngeal and oropharyngeal structures are unremarkable. The hypopharyngeal and laryngeal structures are unremarkable.   Retropharyngeal and Prevertebral Soft Tissues: Unremarkable.   Lymph nodes: There are few non specific bilateral neck nodes, probably reactive in etiology.   Neck vessels: Bilateral neck vessels are normal in course and caliber and appear patent. Atherosclerotic calcification of the carotid arteries at the carotid bifurcation.   Thyroid gland: The thyroid gland is unremarkable in size and appearance.   Parotid and submandibular glands: There is asymmetric enlargement and hyperenhancement of the left parotid gland compared to the right suggesting an infectious/inflammatory parotiditis. No focal fluid collection. There is asymmetric fat stranding and subcutaneous reticulation within the left neck involving buccal fat, parotid space,  space, submandibular space and lateral left neck extending to the level of the thyroid cartilage. There is thickening of the left platysma muscle. There is mild thickening of the left masseter and left sternocleidomastoid muscle.   There is slight asymmetric enhancement enlargement of the left submandibular gland compared to the right. No focal fluid collection. No calcifications are noted within the left submandibular gland or left parotid gland.   Paranasal Sinuses and Mastoids: Visualized paranasal sinuses and bilateral mastoids are predominantly clear. There is minimal mucosal thickening within the ethmoid air cells, right greater than left. Trace opacification within the right maxillary sinus.   There is odontogenic disease with a periapical lucency involving a molar of the maxilla on the  right.   Visualized orbital structures are unremarkable.   Marked emphysematous changes within the lung apices.   Degenerative changes of the cervical spine without significant spinal canal stenosis.       There is asymmetric hyperenhancement and enlargement of the left parotid gland as well as the left submandibular gland to a lesser degree suggesting an infectious/inflammatory sialadenitis. No focal fluid collection is visualized. No calcification is noted. There is edema and inflammatory changes within the left neck which is likely a reactive cellulitis/myositis.   Marked emphysematous changes within the lung apices.   Odontogenic disease with a periapical lucency involving a molar of the maxilla on the right.   Trace mucosal thickening within the right maxillary sinus and minimal mucosal thickening within the ethmoid air cells.     MACRO: None   Signed by: Sowmya Davies 1/7/2024 11:07 AM Dictation workstation:   [id number]    CT soft tissue neck w IV contrast    Result Date: 1/7/2024  Interpreted By:  Sowmya Davies, STUDY: CT MAXILLOFACIAL BONES W IV CONTRAST; CT SOFT TISSUE NECK W IV CONTRAST;  1/7/2024 10:53 am; 1/7/2024 10:55 am   INDICATION: Signs/Symptoms:left facial and neck swelling.   COMPARISON: None.   ACCESSION NUMBER(S): [id number]; [id number]   ORDERING CLINICIAN: MAYDA GORDON   TECHNIQUE: Axial CT images of the neck were obtained and maxillofacial region. The patient received 75 mL Omnipaque 350 intravenous contrast agent. The images were reformatted in angled axial, coronal and sagittal planes.   FINDINGS: Oral Cavity, Pharynx and Larynx:  Evaluation oral cavity is limited by streak artifact from dental hardware.  The nasopharyngeal and oropharyngeal structures are unremarkable. The hypopharyngeal and laryngeal structures are unremarkable.   Retropharyngeal and Prevertebral Soft Tissues: Unremarkable.   Lymph nodes: There are few non specific bilateral neck nodes, probably reactive in etiology.    Neck vessels: Bilateral neck vessels are normal in course and caliber and appear patent. Atherosclerotic calcification of the carotid arteries at the carotid bifurcation.   Thyroid gland: The thyroid gland is unremarkable in size and appearance.   Parotid and submandibular glands: There is asymmetric enlargement and hyperenhancement of the left parotid gland compared to the right suggesting an infectious/inflammatory parotiditis. No focal fluid collection. There is asymmetric fat stranding and subcutaneous reticulation within the left neck involving buccal fat, parotid space,  space, submandibular space and lateral left neck extending to the level of the thyroid cartilage. There is thickening of the left platysma muscle. There is mild thickening of the left masseter and left sternocleidomastoid muscle.   There is slight asymmetric enhancement enlargement of the left submandibular gland compared to the right. No focal fluid collection. No calcifications are noted within the left submandibular gland or left parotid gland.   Paranasal Sinuses and Mastoids: Visualized paranasal sinuses and bilateral mastoids are predominantly clear. There is minimal mucosal thickening within the ethmoid air cells, right greater than left. Trace opacification within the right maxillary sinus.   There is odontogenic disease with a periapical lucency involving a molar of the maxilla on the right.   Visualized orbital structures are unremarkable.   Marked emphysematous changes within the lung apices.   Degenerative changes of the cervical spine without significant spinal canal stenosis.       There is asymmetric hyperenhancement and enlargement of the left parotid gland as well as the left submandibular gland to a lesser degree suggesting an infectious/inflammatory sialadenitis. No focal fluid collection is visualized. No calcification is noted. There is edema and inflammatory changes within the left neck which is likely a  reactive cellulitis/myositis.   Marked emphysematous changes within the lung apices.   Odontogenic disease with a periapical lucency involving a molar of the maxilla on the right.   Trace mucosal thickening within the right maxillary sinus and minimal mucosal thickening within the ethmoid air cells.     MACRO: None   Signed by: Sowmya Davies 1/7/2024 11:07 AM Dictation workstation:   [id number]        Assessment/Plan     Parotiditis  -imaging without abscess  -failed OP abx/steroids   -received cefepime and flagyl in the ED   -Start ceftriaxone and continue flagyl   -pain control     COPD  -Not in exacerbation   -Continue home inhalers     Tobacco use disorder  -Patient was counseled on smoking cessation  -nicotine patch while IP     Alcohol Use Disorder   -CIWA    Hypertension  -Continue home medication    Anxiety/depression  -On Celexa    DVT prophylaxis  -Lovenox       Tyrese Durand MD PhD

## 2024-01-07 NOTE — ED PROVIDER NOTES
HPI   Chief Complaint   Patient presents with    Facial Swelling       History of present illness: 73-year-old female complains of left facial swelling for the past week.  Patient was seen in urgent care 5 days ago for the swelling and given prescription for doxycycline and prednisone.  She says the pain and swelling have gotten worse most specifically over the last night.  Patient has associated fevers and chills.  Says the pain is 10 of 10, aching, persistent.  Denies any chest pain, shortness of breath, lightheadedness, dizziness, nausea, vomiting, diarrhea, constipation, dysuria, polyuria.  Patient has a baseline cough that is no worse than typical.      Review of systems: Constitutional, eye, ENT, cardiovascular, respiratory, gastrointestinal, genitourinary, neurologic, musculoskeletal, dermatologic, hematologic, endocrine systems were evaluated and were negative unless otherwise specified in history of present illness.    Medications: Reviewed and per nursing note.    Family history: Denies relevant medical conditions.    Past medical history: COPD, hypertension        Physical exam:    Appearance: Well-developed, well-nourished, nontoxic-appearing, alert and oriented x3. Talking in complete sentences.    HEENT: Edema and tenderness left maxillary and buccal region around the parotid location.  Mild trismus, not able to open mouth completely.  Head normocephalic atraumatic, extraocular movements intact, pupils equal round reactive to light, mucous membranes are moist and pink, normal facial symmetry. No pharyngeal erythema, edema, exudates. Uvula is midline with no stridor, drooling. No induration the floor of the mouth.  Normal tympanic membranes.    NECK:  Nml Inspection, no meningismus, no thyromegaly, no lymphadenopathy, trachea is midline, no JVD.    Respiratory: Clear to auscultation bilaterally with normal bilateral excursion. No wheezes, rhonchi, rales.    Cardiovascular: Regular rate and rhythm, no  PT BOOKED 10/29/2019 AT 10AM W/ EMILY    murmurs rubs or gallops. Pulses 2+ symmetrically in the dorsalis pedis and radial pulses.    Abdomen/GI:  Soft, nontender.    :  No CVA tenderness    Neuro:  Oriented x 3, Speech Clear, cranial nerves grossly intact.    Musculoskeletal: Patient spontaneously moves all 4 extremities.    Skin:  No rashes.                          [place] Coma Scale Score: 15                  Patient History   Past Medical History:   Diagnosis Date    COPD (chronic obstructive pulmonary disease) (CMS/HCC)     Hypertension      Past Surgical History:   Procedure Laterality Date    FACIAL COSMETIC SURGERY      TUBAL LIGATION  03/16/2016    Tubal Ligation     Family History   Problem Relation Name Age of Onset    Breast cancer Mother      Other (bladder cancer) Mother      Hepatitis Sister      Arthritis Sister       Social History     Tobacco Use    Smoking status: Every Day     Packs/day: 1     Types: Cigarettes    Smokeless tobacco: Never   Substance Use Topics    Alcohol use: Yes     Alcohol/week: 12.0 standard drinks of alcohol     Types: 12 Standard drinks or equivalent per week     Comment: 2 beers at dinner, 2 glasses of wine after    Drug use: Never       Physical Exam   ED Triage Vitals [01/07/24 0612]   Temp Heart Rate Resp BP   37.4 °C (99.3 °F) 100 18 (!) 177/98      SpO2 Temp Source Heart Rate Source Patient Position   93 % Temporal Monitor --      BP Location FiO2 (%)     -- --       Physical Exam    ED Course & MDM   Diagnoses as of 01/07/24 1534   Parotiditis       Medical Decision Making  CT soft tissue neck w IV contrast   Final Result    There is asymmetric hyperenhancement and enlargement of the left    parotid gland as well as the left submandibular gland to a lesser    degree suggesting an infectious/inflammatory sialadenitis. No focal    fluid collection is visualized. No calcification is noted. There is    edema and inflammatory changes within the left neck which is likely a    reactive cellulitis/myositis.           Marked emphysematous changes within the lung apices.          Odontogenic disease with a periapical lucency involving a molar of    the maxilla on the right.          Trace mucosal thickening within the right maxillary sinus and minimal    mucosal thickening within the ethmoid air cells.                MACRO:    None          Signed by: Sowmya Davies 1/7/2024 11:07 AM    Dictation workstation:   [id number]     CT facial bones w IV contrast   Final Result    There is asymmetric hyperenhancement and enlargement of the left    parotid gland as well as the left submandibular gland to a lesser    degree suggesting an infectious/inflammatory sialadenitis. No focal    fluid collection is visualized. No calcification is noted. There is    edema and inflammatory changes within the left neck which is likely a    reactive cellulitis/myositis.          Marked emphysematous changes within the lung apices.          Odontogenic disease with a periapical lucency involving a molar of    the maxilla on the right.          Trace mucosal thickening within the right maxillary sinus and minimal    mucosal thickening within the ethmoid air cells.                MACRO:    None          Signed by: Sowmya Davies 1/7/2024 11:07 AM    Dictation workstation:   [id number]       Labs Reviewed  CBC WITH AUTO DIFFERENTIAL - Abnormal     WBC                           13.1 (*)               nRBC                          0.0                    RBC                           4.86                   Hemoglobin                    16.0                   Hematocrit                    46.5 (*)               MCV                           96                     MCH                           32.9                   MCHC                          34.4                   RDW                           12.7                   Platelets                     300                    Neutrophils %                 78.6                   Immature Granulocytes %, Automated   0.5                     Lymphocytes %                 11.3                   Monocytes %                   8.8                    Eosinophils %                 0.5                    Basophils %                   0.3                    Neutrophils Absolute          10.31 (*)               Immature Granulocytes Absolute, Au*   0.07                   Lymphocytes Absolute          1.49                   Monocytes Absolute            1.16 (*)               Eosinophils Absolute          0.07                   Basophils Absolute            0.04                COMPREHENSIVE METABOLIC PANEL - Abnormal     Glucose                       87                     Sodium                        137                    Potassium                     3.7                    Chloride                      97 (*)                 Bicarbonate                   31                     Anion Gap                     13                     Urea Nitrogen                 7                      Creatinine                    0.41 (*)               eGFR                          >90                    Calcium                       8.9                    Albumin                       4.4                    Alkaline Phosphatase          66                     Total Protein                 7.7                    AST                           11                     Bilirubin, Total              0.6                    ALT                           12                  LACTATE - Normal     Lactate                       1.1                        Narrative: Venipuncture immediately after or during the administration of Metamizole may lead to falsely low results. Testing should be performed immediately                  prior to Metamizole dosing.  SEDIMENTATION RATE, AUTOMATED - Normal     Sedimentation Rate            18                  C-REACTIVE PROTEIN - Normal     C-Reactive Protein            0.46                ALCOHOL - Normal     Alcohol                        <10                 BLOOD CULTURE  BLOOD CULTURE  URINALYSIS WITH REFLEX CULTURE AND MICROSCOPIC         Narrative: The following orders were created for panel order Urinalysis with Reflex Culture and Microscopic.                  Procedure                               Abnormality         Status                                     ---------                               -----------         ------                                     Urinalysis with Reflex C...[[id number]]                                                                 Extra Urine Gray Tube[[id number]]                                                                                         Please view results for these tests on the individual orders.  URINALYSIS WITH REFLEX CULTURE AND MICROSCOPIC  EXTRA URINE GRAY TUBE      Patient presents with left facial pain and swelling for the past week.  Recently got worse over the night.  Differential diagnosis of parotitis, suppurative parotitis, parotid abscess, osteomyelitis, sialoadenitis, tonsillitis, meningitis, peritonsillar abscess, sialolithiasis, COVID-19, influenza.  Examination shows pain and tenderness with swelling of the left parotid region.  Patient previously taken antibiotics and steroids and has gotten worse.  Reports fevers and chills.    CBC CMP sed rate CRP lactate CT face and neck with contrast ordered.  Given morphine, Toradol, normal saline IV.    Blood work shows chemistries showing no acute findings, CBC with elevated white blood cell count 13.1, normal lactate, sed rate unremarkable, ethanol negative.  Nursing staff indicated patient has history of alcohol use disorder and noted some tremulousness so CIWA was initiated.  CT scan does show inflammation of the parotid and local glands consistent with parotitis.  Patient's presentation consistent with acute bacterial suppurative parotitis with failure of treatment outpatient.    [facility] currently does not have otolaryngology on-call  at this time.  Subsequently spoke to transfer center and otolaryngologist Dr. Spring.  Recommends IV antibiotic treatments and no intervention necessary at this time.  There is no airway compromise at this time.  Recommend IV antibiotics with hospitalization at [place] and will be available if there is any change in condition.  Sepsis protocol IV antibiotics were initiated with cefepime and metronidazole.  Patient currently in hemodynamically stable condition.              Procedure  Procedures     Jameel Keller PA-C  01/07/24 1548

## 2024-01-08 PROCEDURE — 99233 SBSQ HOSP IP/OBS HIGH 50: CPT | Performed by: INTERNAL MEDICINE

## 2024-01-08 PROCEDURE — 2500000004 HC RX 250 GENERAL PHARMACY W/ HCPCS (ALT 636 FOR OP/ED): Performed by: INTERNAL MEDICINE

## 2024-01-08 PROCEDURE — 2500000001 HC RX 250 WO HCPCS SELF ADMINISTERED DRUGS (ALT 637 FOR MEDICARE OP): Performed by: INTERNAL MEDICINE

## 2024-01-08 PROCEDURE — 2500000002 HC RX 250 W HCPCS SELF ADMINISTERED DRUGS (ALT 637 FOR MEDICARE OP, ALT 636 FOR OP/ED): Performed by: INTERNAL MEDICINE

## 2024-01-08 PROCEDURE — 1100000001 HC PRIVATE ROOM DAILY

## 2024-01-08 PROCEDURE — S4991 NICOTINE PATCH NONLEGEND: HCPCS | Performed by: INTERNAL MEDICINE

## 2024-01-08 RX ORDER — OXYCODONE HYDROCHLORIDE 10 MG/1
10 TABLET ORAL EVERY 4 HOURS PRN
Status: DISCONTINUED | OUTPATIENT
Start: 2024-01-08 | End: 2024-01-10 | Stop reason: HOSPADM

## 2024-01-08 RX ORDER — MORPHINE SULFATE 2 MG/ML
2 INJECTION, SOLUTION INTRAMUSCULAR; INTRAVENOUS EVERY 4 HOURS PRN
Status: DISCONTINUED | OUTPATIENT
Start: 2024-01-08 | End: 2024-01-10 | Stop reason: HOSPADM

## 2024-01-08 RX ADMIN — OXYCODONE HYDROCHLORIDE 10 MG: 10 TABLET ORAL at 08:30

## 2024-01-08 RX ADMIN — LISINOPRIL 40 MG: 20 TABLET ORAL at 08:01

## 2024-01-08 RX ADMIN — NICOTINE 1 PATCH: 14 PATCH, EXTENDED RELEASE TRANSDERMAL at 08:01

## 2024-01-08 RX ADMIN — METRONIDAZOLE 500 MG: 500 INJECTION, SOLUTION INTRAVENOUS at 03:09

## 2024-01-08 RX ADMIN — CEFTRIAXONE SODIUM 1 G: 1 INJECTION, SOLUTION INTRAVENOUS at 20:52

## 2024-01-08 RX ADMIN — ACETAMINOPHEN 650 MG: 325 TABLET ORAL at 20:50

## 2024-01-08 RX ADMIN — THIAMINE HYDROCHLORIDE 100 MG: 100 INJECTION, SOLUTION INTRAMUSCULAR; INTRAVENOUS at 08:01

## 2024-01-08 RX ADMIN — METRONIDAZOLE 500 MG: 500 INJECTION, SOLUTION INTRAVENOUS at 11:28

## 2024-01-08 RX ADMIN — Medication 3 MG: at 20:50

## 2024-01-08 RX ADMIN — OXYCODONE HYDROCHLORIDE 10 MG: 10 TABLET ORAL at 04:25

## 2024-01-08 RX ADMIN — CITALOPRAM HYDROBROMIDE 20 MG: 20 TABLET ORAL at 08:01

## 2024-01-08 RX ADMIN — AMLODIPINE BESYLATE 2.5 MG: 2.5 TABLET ORAL at 08:01

## 2024-01-08 RX ADMIN — KETOROLAC TROMETHAMINE 15 MG: 30 INJECTION, SOLUTION INTRAMUSCULAR at 00:21

## 2024-01-08 RX ADMIN — METRONIDAZOLE 500 MG: 500 INJECTION, SOLUTION INTRAVENOUS at 21:29

## 2024-01-08 RX ADMIN — OXYCODONE HYDROCHLORIDE 7.5 MG: 5 TABLET ORAL at 00:21

## 2024-01-08 ASSESSMENT — LIFESTYLE VARIABLES
PULSE: 82
VISUAL DISTURBANCES: NOT PRESENT
ORIENTATION AND CLOUDING OF SENSORIUM: ORIENTED AND CAN DO SERIAL ADDITIONS
TOTAL SCORE: 0
PAROXYSMAL SWEATS: NO SWEAT VISIBLE
TREMOR: NO TREMOR
AUDITORY DISTURBANCES: NOT PRESENT
AGITATION: NORMAL ACTIVITY
ANXIETY: NO ANXIETY, AT EASE
TOTAL SCORE: 0
NAUSEA AND VOMITING: NO NAUSEA AND NO VOMITING
VISUAL DISTURBANCES: NOT PRESENT
AGITATION: NORMAL ACTIVITY
VISUAL DISTURBANCES: NOT PRESENT
TREMOR: NOT VISIBLE, BUT CAN BE FELT FINGERTIP TO FINGERTIP
ANXIETY: NO ANXIETY, AT EASE
PULSE: 85
VISUAL DISTURBANCES: NOT PRESENT
NAUSEA AND VOMITING: NO NAUSEA AND NO VOMITING
TREMOR: NO TREMOR
TOTAL SCORE: 1
ORIENTATION AND CLOUDING OF SENSORIUM: ORIENTED AND CAN DO SERIAL ADDITIONS
TREMOR: NO TREMOR
TREMOR: NO TREMOR
ORIENTATION AND CLOUDING OF SENSORIUM: ORIENTED AND CAN DO SERIAL ADDITIONS
HEADACHE, FULLNESS IN HEAD: NOT PRESENT
HEADACHE, FULLNESS IN HEAD: NOT PRESENT
VISUAL DISTURBANCES: NOT PRESENT
PAROXYSMAL SWEATS: BEADS OF SWEAT OBVIOUS ON FOREHEAD
TOTAL SCORE: 5
NAUSEA AND VOMITING: NO NAUSEA AND NO VOMITING
AUDITORY DISTURBANCES: NOT PRESENT
AGITATION: NORMAL ACTIVITY
AUDITORY DISTURBANCES: NOT PRESENT
HEADACHE, FULLNESS IN HEAD: NOT PRESENT
AGITATION: NORMAL ACTIVITY
ORIENTATION AND CLOUDING OF SENSORIUM: ORIENTED AND CAN DO SERIAL ADDITIONS
AUDITORY DISTURBANCES: NOT PRESENT
NAUSEA AND VOMITING: NO NAUSEA AND NO VOMITING
ANXIETY: NO ANXIETY, AT EASE
HEADACHE, FULLNESS IN HEAD: NOT PRESENT
NAUSEA AND VOMITING: NO NAUSEA AND NO VOMITING
AGITATION: NORMAL ACTIVITY
PAROXYSMAL SWEATS: NO SWEAT VISIBLE
AGITATION: NORMAL ACTIVITY
ORIENTATION AND CLOUDING OF SENSORIUM: ORIENTED AND CAN DO SERIAL ADDITIONS
NAUSEA AND VOMITING: NO NAUSEA AND NO VOMITING
AUDITORY DISTURBANCES: NOT PRESENT
TOTAL SCORE: 0
HEADACHE, FULLNESS IN HEAD: NOT PRESENT
PAROXYSMAL SWEATS: NO SWEAT VISIBLE
ANXIETY: NO ANXIETY, AT EASE
TREMOR: NOT VISIBLE, BUT CAN BE FELT FINGERTIP TO FINGERTIP
TOTAL SCORE: 0
ORIENTATION AND CLOUDING OF SENSORIUM: ORIENTED AND CAN DO SERIAL ADDITIONS
ANXIETY: NO ANXIETY, AT EASE
AUDITORY DISTURBANCES: NOT PRESENT
HEADACHE, FULLNESS IN HEAD: NOT PRESENT
ANXIETY: NO ANXIETY, AT EASE
VISUAL DISTURBANCES: NOT PRESENT

## 2024-01-08 ASSESSMENT — COGNITIVE AND FUNCTIONAL STATUS - GENERAL
TOILETING: A LITTLE
STANDING UP FROM CHAIR USING ARMS: A LITTLE
MOBILITY SCORE: 19
TOILETING: A LITTLE
DRESSING REGULAR UPPER BODY CLOTHING: A LITTLE
TURNING FROM BACK TO SIDE WHILE IN FLAT BAD: A LITTLE
MOVING TO AND FROM BED TO CHAIR: A LITTLE
WALKING IN HOSPITAL ROOM: A LITTLE
DAILY ACTIVITIY SCORE: 21
STANDING UP FROM CHAIR USING ARMS: A LITTLE
DAILY ACTIVITIY SCORE: 21
TURNING FROM BACK TO SIDE WHILE IN FLAT BAD: A LITTLE
CLIMB 3 TO 5 STEPS WITH RAILING: A LITTLE
DRESSING REGULAR LOWER BODY CLOTHING: A LITTLE
MOBILITY SCORE: 19
CLIMB 3 TO 5 STEPS WITH RAILING: A LITTLE
DRESSING REGULAR LOWER BODY CLOTHING: A LITTLE
MOVING TO AND FROM BED TO CHAIR: A LITTLE
DRESSING REGULAR UPPER BODY CLOTHING: A LITTLE
WALKING IN HOSPITAL ROOM: A LITTLE

## 2024-01-08 ASSESSMENT — PAIN SCALES - GENERAL
PAINLEVEL_OUTOF10: 3
PAINLEVEL_OUTOF10: 10 - WORST POSSIBLE PAIN
PAINLEVEL_OUTOF10: 10 - WORST POSSIBLE PAIN
PAINLEVEL_OUTOF10: 8
PAINLEVEL_OUTOF10: 0 - NO PAIN

## 2024-01-08 ASSESSMENT — PAIN DESCRIPTION - LOCATION
LOCATION: JAW
LOCATION: FACE

## 2024-01-08 ASSESSMENT — PAIN - FUNCTIONAL ASSESSMENT
PAIN_FUNCTIONAL_ASSESSMENT: 0-10

## 2024-01-08 ASSESSMENT — ACTIVITIES OF DAILY LIVING (ADL): LACK_OF_TRANSPORTATION: NO

## 2024-01-08 ASSESSMENT — PAIN DESCRIPTION - ORIENTATION: ORIENTATION: LEFT

## 2024-01-08 NOTE — CARE PLAN
Problem: Skin  Goal: Decreased wound size/increased tissue granulation at next dressing change  Outcome: Progressing  Goal: Participates in plan/prevention/treatment measures  Outcome: Progressing  Goal: Prevent/manage excess moisture  Outcome: Progressing  Goal: Prevent/minimize sheer/friction injuries  Outcome: Progressing  Goal: Promote/optimize nutrition  Outcome: Progressing  Goal: Promote skin healing  Outcome: Progressing     Problem: Fall/Injury  Goal: Not fall by end of shift  Outcome: Progressing  Goal: Be free from injury by end of the shift  Outcome: Progressing  Goal: Verbalize understanding of personal risk factors for fall in the hospital  Outcome: Progressing  Goal: Verbalize understanding of risk factor reduction measures to prevent injury from fall in the home  Outcome: Progressing  Goal: Use assistive devices by end of the shift  Outcome: Progressing  Goal: Pace activities to prevent fatigue by end of the shift  Outcome: Progressing     Problem: Pain  Goal: Takes deep breaths with improved pain control throughout the shift  Outcome: Progressing  Goal: Turns in bed with improved pain control throughout the shift  Outcome: Progressing  Goal: Walks with improved pain control throughout the shift  Outcome: Progressing  Goal: Performs ADL's with improved pain control throughout shift  Outcome: Progressing  Goal: Participates in PT with improved pain control throughout the shift  Outcome: Progressing  Goal: Free from opioid side effects throughout the shift  Outcome: Progressing  Goal: Free from acute confusion related to pain meds throughout the shift  Outcome: Progressing   The patient's goals for the shift include      The clinical goals for the shift include Pt will remain safe and have no falls during shift    Over the shift, the patient did not make progress toward the following goals. Barriers to progression include . Recommendations to address these barriers include .

## 2024-01-08 NOTE — PROGRESS NOTES
01/08/24 1204   Discharge Planning   Living Arrangements Alone   Support Systems Family members;Friends/neighbors   Assistance Needed independent   Type of Residence Private residence   Home or Post Acute Services In home services   Type of Home Care Services DME or oxygen   Patient expects to be discharged to: Home   Does the patient need discharge transport arranged? Yes   RoundTrip coordination needed? Yes   Financial Resource Strain   How hard is it for you to pay for the very basics like food, housing, medical care, and heating? Not hard   Housing Stability   In the last 12 months, was there a time when you were not able to pay the mortgage or rent on time? N   In the last 12 months, was there a time when you did not have a steady place to sleep or slept in a shelter (including now)? N   Transportation Needs   In the past 12 months, has lack of transportation kept you from medical appointments or from getting medications? no   In the past 12 months, has lack of transportation kept you from meetings, work, or from getting things needed for daily living? No     PCP is Krista Pantoja. Patient is from home alone with support from ex-. Patient is independent with ambulation, self care, driving, shopping, and meals.  Patient is on oxygen, none at baseline, if unable to wean to room air, patient will need O2 evaluation prior to discharge. Patient planning to return home when medically ready.     1/10 5529 Patient requesting assistance with cleaning. Private duty list provided to patient.

## 2024-01-08 NOTE — CARE PLAN
Problem: Skin  Goal: Decreased wound size/increased tissue granulation at next dressing change  Outcome: Progressing  Goal: Participates in plan/prevention/treatment measures  Outcome: Progressing  Goal: Prevent/manage excess moisture  Outcome: Progressing  Goal: Prevent/minimize sheer/friction injuries  Outcome: Progressing  Goal: Promote/optimize nutrition  Outcome: Progressing  Goal: Promote skin healing  Outcome: Progressing     Problem: Fall/Injury  Goal: Not fall by end of shift  Outcome: Progressing  Goal: Be free from injury by end of the shift  Outcome: Progressing  Goal: Verbalize understanding of personal risk factors for fall in the hospital  Outcome: Progressing  Goal: Verbalize understanding of risk factor reduction measures to prevent injury from fall in the home  Outcome: Progressing  Goal: Use assistive devices by end of the shift  Outcome: Progressing  Goal: Pace activities to prevent fatigue by end of the shift  Outcome: Progressing     Problem: Pain  Goal: Takes deep breaths with improved pain control throughout the shift  Outcome: Progressing  Goal: Turns in bed with improved pain control throughout the shift  Outcome: Progressing  Goal: Walks with improved pain control throughout the shift  Outcome: Progressing  Goal: Performs ADL's with improved pain control throughout shift  Outcome: Progressing  Goal: Participates in PT with improved pain control throughout the shift  Outcome: Progressing  Goal: Free from opioid side effects throughout the shift  Outcome: Progressing  Goal: Free from acute confusion related to pain meds throughout the shift  Outcome: Progressing   The patient's goals for the shift include      The clinical goals for the shift include Pt will be free from fall or injury through end of this shift.      No fall or injury this shift. Current safety interventions continue, All needs met this shift. No new skin breakdown this shift.

## 2024-01-09 PROCEDURE — 99233 SBSQ HOSP IP/OBS HIGH 50: CPT | Performed by: INTERNAL MEDICINE

## 2024-01-09 PROCEDURE — 2500000004 HC RX 250 GENERAL PHARMACY W/ HCPCS (ALT 636 FOR OP/ED): Performed by: INTERNAL MEDICINE

## 2024-01-09 PROCEDURE — 2500000001 HC RX 250 WO HCPCS SELF ADMINISTERED DRUGS (ALT 637 FOR MEDICARE OP): Performed by: INTERNAL MEDICINE

## 2024-01-09 PROCEDURE — 2500000002 HC RX 250 W HCPCS SELF ADMINISTERED DRUGS (ALT 637 FOR MEDICARE OP, ALT 636 FOR OP/ED): Performed by: INTERNAL MEDICINE

## 2024-01-09 PROCEDURE — S4991 NICOTINE PATCH NONLEGEND: HCPCS | Performed by: INTERNAL MEDICINE

## 2024-01-09 PROCEDURE — 1100000001 HC PRIVATE ROOM DAILY

## 2024-01-09 RX ADMIN — NICOTINE 1 PATCH: 14 PATCH, EXTENDED RELEASE TRANSDERMAL at 08:18

## 2024-01-09 RX ADMIN — LISINOPRIL 40 MG: 20 TABLET ORAL at 08:10

## 2024-01-09 RX ADMIN — Medication 3 MG: at 19:58

## 2024-01-09 RX ADMIN — CEFTRIAXONE SODIUM 1 G: 1 INJECTION, SOLUTION INTRAVENOUS at 19:58

## 2024-01-09 RX ADMIN — OXYCODONE HYDROCHLORIDE 5 MG: 5 TABLET ORAL at 04:37

## 2024-01-09 RX ADMIN — CITALOPRAM HYDROBROMIDE 20 MG: 20 TABLET ORAL at 08:10

## 2024-01-09 RX ADMIN — FLUTICASONE FUROATE AND VILANTEROL TRIFENATATE 1 PUFF: 200; 25 POWDER RESPIRATORY (INHALATION) at 04:27

## 2024-01-09 RX ADMIN — AMLODIPINE BESYLATE 2.5 MG: 2.5 TABLET ORAL at 08:10

## 2024-01-09 RX ADMIN — ACETAMINOPHEN 650 MG: 325 TABLET ORAL at 18:49

## 2024-01-09 RX ADMIN — METRONIDAZOLE 500 MG: 500 INJECTION, SOLUTION INTRAVENOUS at 20:32

## 2024-01-09 RX ADMIN — THIAMINE HYDROCHLORIDE 100 MG: 100 INJECTION, SOLUTION INTRAMUSCULAR; INTRAVENOUS at 08:10

## 2024-01-09 RX ADMIN — METRONIDAZOLE 500 MG: 500 INJECTION, SOLUTION INTRAVENOUS at 04:00

## 2024-01-09 RX ADMIN — METRONIDAZOLE 500 MG: 500 INJECTION, SOLUTION INTRAVENOUS at 12:06

## 2024-01-09 ASSESSMENT — COGNITIVE AND FUNCTIONAL STATUS - GENERAL
MOBILITY SCORE: 21
STANDING UP FROM CHAIR USING ARMS: A LITTLE
WALKING IN HOSPITAL ROOM: A LITTLE
CLIMB 3 TO 5 STEPS WITH RAILING: A LITTLE
TURNING FROM BACK TO SIDE WHILE IN FLAT BAD: A LITTLE
DRESSING REGULAR LOWER BODY CLOTHING: A LITTLE
CLIMB 3 TO 5 STEPS WITH RAILING: A LITTLE
TURNING FROM BACK TO SIDE WHILE IN FLAT BAD: A LITTLE
TOILETING: A LITTLE
MOVING TO AND FROM BED TO CHAIR: A LITTLE
WALKING IN HOSPITAL ROOM: A LITTLE
DRESSING REGULAR LOWER BODY CLOTHING: A LITTLE
MOBILITY SCORE: 19
DAILY ACTIVITIY SCORE: 21
DRESSING REGULAR UPPER BODY CLOTHING: A LITTLE

## 2024-01-09 ASSESSMENT — LIFESTYLE VARIABLES
HEADACHE, FULLNESS IN HEAD: NOT PRESENT
NAUSEA AND VOMITING: NO NAUSEA AND NO VOMITING
VISUAL DISTURBANCES: NOT PRESENT
ANXIETY: NO ANXIETY, AT EASE
VISUAL DISTURBANCES: NOT PRESENT
ANXIETY: NO ANXIETY, AT EASE
VISUAL DISTURBANCES: NOT PRESENT
NAUSEA AND VOMITING: NO NAUSEA AND NO VOMITING
NAUSEA AND VOMITING: NO NAUSEA AND NO VOMITING
AUDITORY DISTURBANCES: NOT PRESENT
HEADACHE, FULLNESS IN HEAD: NOT PRESENT
AGITATION: NORMAL ACTIVITY
TOTAL SCORE: 4
NAUSEA AND VOMITING: NO NAUSEA AND NO VOMITING
TREMOR: NOT VISIBLE, BUT CAN BE FELT FINGERTIP TO FINGERTIP
VISUAL DISTURBANCES: NOT PRESENT
VISUAL DISTURBANCES: NOT PRESENT
AUDITORY DISTURBANCES: NOT PRESENT
ORIENTATION AND CLOUDING OF SENSORIUM: ORIENTED AND CAN DO SERIAL ADDITIONS
HEADACHE, FULLNESS IN HEAD: NOT PRESENT
PAROXYSMAL SWEATS: 3
ORIENTATION AND CLOUDING OF SENSORIUM: ORIENTED AND CAN DO SERIAL ADDITIONS
TREMOR: 2
ORIENTATION AND CLOUDING OF SENSORIUM: ORIENTED AND CAN DO SERIAL ADDITIONS
ORIENTATION AND CLOUDING OF SENSORIUM: ORIENTED AND CAN DO SERIAL ADDITIONS
ANXIETY: NO ANXIETY, AT EASE
AGITATION: NORMAL ACTIVITY
PAROXYSMAL SWEATS: BARELY PERCEPTIBLE SWEATING, PALMS MOIST
PAROXYSMAL SWEATS: 2
TREMOR: 2
VISUAL DISTURBANCES: NOT PRESENT
TOTAL SCORE: 4
ANXIETY: MILDLY ANXIOUS
TREMOR: NOT VISIBLE, BUT CAN BE FELT FINGERTIP TO FINGERTIP
NAUSEA AND VOMITING: NO NAUSEA AND NO VOMITING
PAROXYSMAL SWEATS: 2
TOTAL SCORE: 3
AUDITORY DISTURBANCES: NOT PRESENT
AUDITORY DISTURBANCES: NOT PRESENT
AGITATION: NORMAL ACTIVITY
TOTAL SCORE: 2
AGITATION: NORMAL ACTIVITY
ORIENTATION AND CLOUDING OF SENSORIUM: ORIENTED AND CAN DO SERIAL ADDITIONS
TREMOR: NOT VISIBLE, BUT CAN BE FELT FINGERTIP TO FINGERTIP
ORIENTATION AND CLOUDING OF SENSORIUM: ORIENTED AND CAN DO SERIAL ADDITIONS
ANXIETY: NO ANXIETY, AT EASE
TOTAL SCORE: 5
HEADACHE, FULLNESS IN HEAD: NOT PRESENT
TOTAL SCORE: 4
AGITATION: NORMAL ACTIVITY
AGITATION: NORMAL ACTIVITY
HEADACHE, FULLNESS IN HEAD: NOT PRESENT
NAUSEA AND VOMITING: NO NAUSEA AND NO VOMITING
TREMOR: NO TREMOR
HEADACHE, FULLNESS IN HEAD: NOT PRESENT
ANXIETY: NO ANXIETY, AT EASE
PAROXYSMAL SWEATS: 2
AUDITORY DISTURBANCES: NOT PRESENT
AUDITORY DISTURBANCES: NOT PRESENT
PAROXYSMAL SWEATS: BEADS OF SWEAT OBVIOUS ON FOREHEAD

## 2024-01-09 ASSESSMENT — PAIN SCALES - GENERAL
PAINLEVEL_OUTOF10: 6
PAINLEVEL_OUTOF10: 3
PAINLEVEL_OUTOF10: 4

## 2024-01-09 ASSESSMENT — PAIN DESCRIPTION - ORIENTATION: ORIENTATION: LEFT

## 2024-01-09 ASSESSMENT — PAIN DESCRIPTION - LOCATION: LOCATION: FACE

## 2024-01-09 ASSESSMENT — PAIN - FUNCTIONAL ASSESSMENT: PAIN_FUNCTIONAL_ASSESSMENT: 0-10

## 2024-01-09 NOTE — PROGRESS NOTES
Rosa Ch is a 73 y.o. female on day 2 of admission presenting with Parotiditis.    Subjective   Rosa Ch is a 73 y.o. female with a PMH of COPD, HLD and HTN presenting with face pain.      Patient complains of left facial swelling for the past week.  Patient was seen in urgent care 5 days ago for the swelling and given prescription for doxycycline and prednisone.  She says the pain and swelling have gotten worse most specifically over the last night.  Patient has associated fevers and chills.  Says the pain is 10 of 10, aching, persistent.  Denies any chest pain, shortness of breath, lightheadedness, dizziness, nausea, vomiting, diarrhea, constipation, dysuria, polyuria.  Patient has a baseline cough that is no worse than typical.    1/9:          Objective   Constitutional:       Appearance: Normal appearance.   HENT:      Head: Normocephalic and atraumatic.      Comments: Edema and tenderness left maxillary and buccal region around the parotid location.  Mild trismus, not able to open mouth completely.  Head normocephalic atraumatic, extraocular movements intact, pupils equal round reactive to light, mucous membranes are moist and pink, normal facial symmetry. No pharyngeal erythema, edema, exudates. Uvula is midline with no stridor, drooling. No induration the floor of the mouth.     Mouth/Throat:      Mouth: Mucous membranes are moist.      Pharynx: Oropharynx is clear.   Eyes:      Extraocular Movements: Extraocular movements intact.      Pupils: Pupils are equal, round, and reactive to light.   Cardiovascular:      Rate and Rhythm: Regular rhythm. Tachycardia present.      Heart sounds: Normal heart sounds. No murmur heard.     No friction rub. No gallop.   Pulmonary:      Effort: Pulmonary effort is normal. No respiratory distress.      Breath sounds: No stridor. Wheezing present. No rhonchi or rales.      Comments: Poor air movement B/L, scant expiratory wheeze B/L   Abdominal:      General:  "Abdomen is flat. Bowel sounds are normal. There is no distension.      Palpations: Abdomen is soft.      Tenderness: There is no abdominal tenderness.   Musculoskeletal:         General: No swelling.      Cervical back: No rigidity.   Skin:     General: Skin is warm and dry.   Neurological:      General: No focal deficit present.      Mental Status: She is alert.   Psychiatric:         Mood and Affect: Mood normal.         Behavior: Behavior normal.     Last Recorded Vitals  Blood pressure 107/64, pulse 90, temperature 37.7 °C (99.8 °F), temperature source Temporal, resp. rate 20, height 1.676 m (5' 6\"), weight 59.4 kg (131 lb), SpO2 91 %.  Intake/Output last 3 Shifts:  I/O last 3 completed shifts:  In: 1270 (21.4 mL/kg) [P.O.:1020; IV Piggyback:250]  Out: 250 (4.2 mL/kg) [Urine:250 (0.1 mL/kg/hr)]  Weight: 59.4 kg     Relevant Results                     Assessment/Plan   Parotiditis  -imaging without abscess  -failed OP abx/steroids   -received cefepime and flagyl in the ED   -Start ceftriaxone and continue flagyl D2  -pain control      COPD  -Not in exacerbation   -Continue home inhalers      Tobacco use disorder  -Patient was counseled on smoking cessation  -nicotine patch while IP      Alcohol Use Disorder   -CIWA     Hypertension  -Continue home medication     Anxiety/depression  -On Celexa     DVT prophylaxis  -Lovenox      Tyrese Durand MD PhD      "

## 2024-01-09 NOTE — PROGRESS NOTES
Rosa Ch is a 73 y.o. female on day 2 of admission presenting with Parotiditis.    Subjective   Rosa Ch is a 73 y.o. female with a PMH of COPD, HLD and HTN presenting with face pain.      Patient complains of left facial swelling for the past week.  Patient was seen in urgent care 5 days ago for the swelling and given prescription for doxycycline and prednisone.  She says the pain and swelling have gotten worse most specifically over the last night.  Patient has associated fevers and chills.  Says the pain is 10 of 10, aching, persistent.  Denies any chest pain, shortness of breath, lightheadedness, dizziness, nausea, vomiting, diarrhea, constipation, dysuria, polyuria.  Patient has a baseline cough that is no worse than typical.       1/8: Pain is persistent but improved vs yesterday.     1/9: Pain and swelling continue to improve          Objective     Constitutional:       Appearance: Normal appearance.   HENT:      Head: Normocephalic and atraumatic.      Comments: Edema and tenderness left maxillary and buccal region around the parotid location.  Mild trismus, not able to open mouth completely.  Head normocephalic atraumatic, extraocular movements intact, pupils equal round reactive to light, mucous membranes are moist and pink, normal facial symmetry. No pharyngeal erythema, edema, exudates. Uvula is midline with no stridor, drooling. No induration the floor of the mouth.     Mouth/Throat:      Mouth: Mucous membranes are moist.      Pharynx: Oropharynx is clear.   Eyes:      Extraocular Movements: Extraocular movements intact.      Pupils: Pupils are equal, round, and reactive to light.   Cardiovascular:      Rate and Rhythm: Regular rhythm. Tachycardia present.      Heart sounds: Normal heart sounds. No murmur heard.     No friction rub. No gallop.   Pulmonary:      Effort: Pulmonary effort is normal. No respiratory distress.      Breath sounds: No stridor. Wheezing present. No rhonchi or  "rales.      Comments: Poor air movement B/L, scant expiratory wheeze B/L   Abdominal:      General: Abdomen is flat. Bowel sounds are normal. There is no distension.      Palpations: Abdomen is soft.      Tenderness: There is no abdominal tenderness.   Musculoskeletal:         General: No swelling.      Cervical back: No rigidity.   Skin:     General: Skin is warm and dry.   Neurological:      General: No focal deficit present.      Mental Status: She is alert.   Psychiatric:         Mood and Affect: Mood normal.         Behavior: Behavior normal.     Last Recorded Vitals  Blood pressure 107/64, pulse 90, temperature 37.7 °C (99.8 °F), temperature source Temporal, resp. rate 20, height 1.676 m (5' 6\"), weight 59.4 kg (131 lb), SpO2 91 %.  Intake/Output last 3 Shifts:  I/O last 3 completed shifts:  In: 1270 (21.4 mL/kg) [P.O.:1020; IV Piggyback:250]  Out: 250 (4.2 mL/kg) [Urine:250 (0.1 mL/kg/hr)]  Weight: 59.4 kg     Relevant Results                             Assessment/Plan   Parotiditis  -imaging without abscess  -failed OP abx/steroids   -received cefepime and flagyl in the ED   -Start ceftriaxone and continue flagyl D2  -pain control      COPD  -Not in exacerbation   -Continue home inhalers      Tobacco use disorder  -Patient was counseled on smoking cessation  -nicotine patch while IP      Alcohol Use Disorder   -CIWA     Hypertension  -Continue home medication     Anxiety/depression  -On Celexa     DVT prophylaxis  -Lovenox      Tyrese Durand MD PhD      "

## 2024-01-10 ENCOUNTER — PHARMACY VISIT (OUTPATIENT)
Dept: PHARMACY | Facility: CLINIC | Age: 74
End: 2024-01-10
Payer: COMMERCIAL

## 2024-01-10 VITALS
TEMPERATURE: 97.8 F | SYSTOLIC BLOOD PRESSURE: 144 MMHG | RESPIRATION RATE: 20 BRPM | WEIGHT: 131 LBS | DIASTOLIC BLOOD PRESSURE: 63 MMHG | BODY MASS INDEX: 21.05 KG/M2 | HEIGHT: 66 IN | HEART RATE: 98 BPM | OXYGEN SATURATION: 90 %

## 2024-01-10 PROCEDURE — 99239 HOSP IP/OBS DSCHRG MGMT >30: CPT | Performed by: INTERNAL MEDICINE

## 2024-01-10 PROCEDURE — 2500000004 HC RX 250 GENERAL PHARMACY W/ HCPCS (ALT 636 FOR OP/ED): Performed by: INTERNAL MEDICINE

## 2024-01-10 PROCEDURE — 2500000001 HC RX 250 WO HCPCS SELF ADMINISTERED DRUGS (ALT 637 FOR MEDICARE OP): Performed by: INTERNAL MEDICINE

## 2024-01-10 PROCEDURE — RXMED WILLOW AMBULATORY MEDICATION CHARGE

## 2024-01-10 PROCEDURE — S4991 NICOTINE PATCH NONLEGEND: HCPCS | Performed by: INTERNAL MEDICINE

## 2024-01-10 PROCEDURE — 2500000002 HC RX 250 W HCPCS SELF ADMINISTERED DRUGS (ALT 637 FOR MEDICARE OP, ALT 636 FOR OP/ED): Performed by: INTERNAL MEDICINE

## 2024-01-10 RX ORDER — OXYCODONE HYDROCHLORIDE 5 MG/1
5 TABLET ORAL EVERY 6 HOURS PRN
Qty: 20 TABLET | Refills: 0 | Status: SHIPPED | OUTPATIENT
Start: 2024-01-10 | End: 2024-03-06 | Stop reason: WASHOUT

## 2024-01-10 RX ORDER — CLINDAMYCIN HYDROCHLORIDE 300 MG/1
300 CAPSULE ORAL 3 TIMES DAILY
Qty: 21 CAPSULE | Refills: 0 | Status: SHIPPED | OUTPATIENT
Start: 2024-01-10 | End: 2024-01-17

## 2024-01-10 RX ADMIN — LISINOPRIL 40 MG: 20 TABLET ORAL at 08:11

## 2024-01-10 RX ADMIN — POLYETHYLENE GLYCOL 3350 17 G: 17 POWDER, FOR SOLUTION ORAL at 08:11

## 2024-01-10 RX ADMIN — CITALOPRAM HYDROBROMIDE 20 MG: 20 TABLET ORAL at 08:11

## 2024-01-10 RX ADMIN — NICOTINE 1 PATCH: 14 PATCH, EXTENDED RELEASE TRANSDERMAL at 08:11

## 2024-01-10 RX ADMIN — METRONIDAZOLE 500 MG: 500 INJECTION, SOLUTION INTRAVENOUS at 04:27

## 2024-01-10 RX ADMIN — AMLODIPINE BESYLATE 2.5 MG: 2.5 TABLET ORAL at 08:11

## 2024-01-10 ASSESSMENT — LIFESTYLE VARIABLES
ANXIETY: NO ANXIETY, AT EASE
TREMOR: NOT VISIBLE, BUT CAN BE FELT FINGERTIP TO FINGERTIP
TOTAL SCORE: 3
NAUSEA AND VOMITING: NO NAUSEA AND NO VOMITING
AUDITORY DISTURBANCES: NOT PRESENT
PAROXYSMAL SWEATS: 2
AGITATION: NORMAL ACTIVITY
HEADACHE, FULLNESS IN HEAD: NOT PRESENT
ORIENTATION AND CLOUDING OF SENSORIUM: ORIENTED AND CAN DO SERIAL ADDITIONS
VISUAL DISTURBANCES: NOT PRESENT

## 2024-01-10 ASSESSMENT — PAIN SCALES - GENERAL: PAINLEVEL_OUTOF10: 0 - NO PAIN

## 2024-01-10 NOTE — DISCHARGE SUMMARY
DISCHARGE SUMMARY     Discharge Diagnosis  Parotiditis    This discharge took greater than 35 minutes.    Test Results Pending At Discharge  Pending Labs       Order Current Status    Blood Culture Preliminary result    Blood Culture Preliminary result            Hospital Course   Rosa Ch is a 73 y.o. female with a PMH of COPD, HLD and HTN presenting with face pain.      Patient complains of left facial swelling for the past week.  Patient was seen in urgent care 5 days ago for the swelling and given prescription for doxycycline and prednisone.  She says the pain and swelling have gotten worse most specifically over the last night.  Patient has associated fevers and chills.  Says the pain is 10 of 10, aching, persistent.  Denies any chest pain, shortness of breath, lightheadedness, dizziness, nausea, vomiting, diarrhea, constipation, dysuria, polyuria.  Patient has a baseline cough that is no worse than typical.    Parotiditis  -imaging without abscess  -failed OP abx/steroids   -received cefepime and flagyl in the ED   -Started ceftriaxone and flagyl then transitioned to clindamycin on DC (she has anaphylactic allergy to PCNs and has EtOH use disorder. I discussed the possibility of abstaining from EtOH for the course of the Flagyl but she said this was not possible so, clindamycin was chosen for its anaerobic coverage.       COPD  -Not in exacerbation   -Continue home inhalers      Tobacco use disorder  -Patient was counseled on smoking cessation  -nicotine patch while IP      Alcohol Use Disorder   -CIWA     Hypertension  -Continue home medication     Anxiety/depression  -On Celexa     DVT prophylaxis  -Lovenox      Pertinent Physical Exam At Time of Discharge  Constitutional:       Appearance: Normal appearance.   HENT:      Head: Normocephalic and atraumatic.      Comments: Edema and tenderness left maxillary and buccal region around the parotid location, much improved vs admission.      Mouth/Throat:       Mouth: Mucous membranes are moist.      Pharynx: Oropharynx is clear.   Eyes:      Extraocular Movements: Extraocular movements intact.      Pupils: Pupils are equal, round, and reactive to light.   Cardiovascular:      Rate and Rhythm: Regular rhythm. Tachycardia present.      Heart sounds: Normal heart sounds. No murmur heard.     No friction rub. No gallop.   Pulmonary:      Effort: Pulmonary effort is normal. No respiratory distress.      Breath sounds: No stridor. Wheezing present. No rhonchi or rales.      Comments: Poor air movement B/L, scant expiratory wheeze B/L   Abdominal:      General: Abdomen is flat. Bowel sounds are normal. There is no distension.      Palpations: Abdomen is soft.      Tenderness: There is no abdominal tenderness.   Musculoskeletal:         General: No swelling.      Cervical back: No rigidity.   Skin:     General: Skin is warm and dry.   Neurological:      General: No focal deficit present.      Mental Status: She is alert.   Psychiatric:         Mood and Affect: Mood normal.         Behavior: Behavior normal.    Home Medications     Medication List      START taking these medications     clindamycin 300 mg capsule; Commonly known as: Cleocin; Take 1 capsule   (300 mg) by mouth 3 times a day for 7 days.   oxyCODONE 5 mg immediate release tablet; Commonly known as: Roxicodone;   Take 1 tablet (5 mg) by mouth every 6 hours if needed for moderate pain (4   - 6). K11.20     CONTINUE taking these medications     * albuterol 90 mcg/actuation inhaler; Commonly known as: ProAir HFA;   Inhale 2 puffs every 6 hours if needed for wheezing.   * albuterol 90 mcg/actuation inhaler; Commonly known as: ProAir HFA;   Inhale 2 puffs every 6 hours if needed for wheezing.   amLODIPine 2.5 mg tablet; Commonly known as: Norvasc; Take 1 tablet (2.5   mg) by mouth once daily.   budesonide-formoteroL 160-4.5 mcg/actuation inhaler; Commonly known as:   Symbicort; Inhale 2 puffs 2 times a day.    citalopram 20 mg tablet; Commonly known as: CeleXA; Take 1 tablet (20   mg) by mouth once daily.   lisinopril 40 mg tablet; Take 1 tablet (40 mg) by mouth once daily.  * This list has 2 medication(s) that are the same as other medications   prescribed for you. Read the directions carefully, and ask your doctor or   other care provider to review them with you.       Outpatient Follow-Up  No follow-ups on file.     Tyrese Durand MD PhD  1/10/2024  11:43 AM

## 2024-01-11 ENCOUNTER — DOCUMENTATION (OUTPATIENT)
Dept: PRIMARY CARE | Facility: CLINIC | Age: 74
End: 2024-01-11
Payer: MEDICARE

## 2024-01-11 ENCOUNTER — PATIENT OUTREACH (OUTPATIENT)
Dept: CARE COORDINATION | Facility: CLINIC | Age: 74
End: 2024-01-11
Payer: MEDICARE

## 2024-01-11 LAB
BACTERIA BLD CULT: NORMAL
BACTERIA BLD CULT: NORMAL

## 2024-01-11 NOTE — PROGRESS NOTES
Discharge Facility:[facility]  Discharge Diagnosis:Parotiditis  Admission Date:01/07/24  Discharge Date: 01/10/24    PCP Appointment Date:none made sent to pcp office  Specialist Appointment Date:   Hospital Encounter and Summary: Linked   See discharge assessment below for further details  2 attempts

## 2024-01-16 ENCOUNTER — APPOINTMENT (OUTPATIENT)
Dept: PULMONOLOGY | Facility: HOSPITAL | Age: 74
End: 2024-01-16
Payer: MEDICARE

## 2024-01-18 DIAGNOSIS — J43.9 PULMONARY EMPHYSEMA, UNSPECIFIED EMPHYSEMA TYPE (MULTI): Primary | ICD-10-CM

## 2024-01-23 ENCOUNTER — OFFICE VISIT (OUTPATIENT)
Dept: PRIMARY CARE | Facility: CLINIC | Age: 74
End: 2024-01-23
Payer: MEDICARE

## 2024-01-23 ENCOUNTER — PATIENT OUTREACH (OUTPATIENT)
Dept: CARE COORDINATION | Facility: CLINIC | Age: 74
End: 2024-01-23

## 2024-01-23 VITALS
HEART RATE: 68 BPM | TEMPERATURE: 98.3 F | WEIGHT: 131 LBS | OXYGEN SATURATION: 84 % | BODY MASS INDEX: 20.56 KG/M2 | HEIGHT: 67 IN

## 2024-01-23 DIAGNOSIS — A41.9 SEPSIS, DUE TO UNSPECIFIED ORGANISM, UNSPECIFIED WHETHER ACUTE ORGAN DYSFUNCTION PRESENT (MULTI): ICD-10-CM

## 2024-01-23 DIAGNOSIS — K11.20 PAROTIDITIS: ICD-10-CM

## 2024-01-23 DIAGNOSIS — Z09 HOSPITAL DISCHARGE FOLLOW-UP: Primary | ICD-10-CM

## 2024-01-23 DIAGNOSIS — J43.9 PULMONARY EMPHYSEMA, UNSPECIFIED EMPHYSEMA TYPE (MULTI): ICD-10-CM

## 2024-01-23 PROBLEM — J18.9 COMMUNITY ACQUIRED PNEUMONIA: Status: ACTIVE | Noted: 2023-01-11

## 2024-01-23 PROBLEM — R09.02 HYPOXIA: Status: ACTIVE | Noted: 2024-01-23

## 2024-01-23 PROBLEM — J96.91 RESPIRATORY FAILURE WITH HYPOXIA (MULTI): Status: ACTIVE | Noted: 2019-02-28

## 2024-01-23 PROBLEM — J98.01 BRONCHOSPASM: Status: ACTIVE | Noted: 2024-01-23

## 2024-01-23 PROBLEM — R06.00 DYSPNEA: Status: ACTIVE | Noted: 2024-01-23

## 2024-01-23 PROBLEM — F17.200 CURRENT SMOKER: Status: ACTIVE | Noted: 2019-02-28

## 2024-01-23 PROCEDURE — 1159F MED LIST DOCD IN RCRD: CPT | Performed by: INTERNAL MEDICINE

## 2024-01-23 PROCEDURE — 1125F AMNT PAIN NOTED PAIN PRSNT: CPT | Performed by: INTERNAL MEDICINE

## 2024-01-23 PROCEDURE — 99495 TRANSJ CARE MGMT MOD F2F 14D: CPT | Performed by: INTERNAL MEDICINE

## 2024-01-23 PROCEDURE — 1111F DSCHRG MED/CURRENT MED MERGE: CPT | Performed by: INTERNAL MEDICINE

## 2024-01-23 NOTE — PROGRESS NOTES
"PCP: Krista Pantoja MD   I have reviewed existing histories, notes, past medical history, surgical history, social history, family history, med list, allergy list, and immunization, and updated.    HPI:   Follow up hospital admission, and also requesting portable oxygen tank. She has COPD, and does not use o2 at home, but she gets out of breath when a couple of minutes of walking. She loves to go out shopping daily, she likes fresh food, meat etc.  She was in hospital for infected left parotid gland, from 1/7-1/11.  Given iv antibiotic.  She is much better now. No more swelling or pain and no Fever and chills      Lab Results   Component Value Date    WBC 13.1 (H) 01/07/2024    HGB 16.0 01/07/2024    HCT 46.5 (H) 01/07/2024     01/07/2024    CHOL 258 (H) 06/08/2022    TRIG 147 05/19/2020    HDL 62.3 06/08/2022    LDLDIRECT 168 (H) 06/08/2022    ALT 12 01/07/2024    AST 11 01/07/2024     01/07/2024    K 3.7 01/07/2024    CL 97 (L) 01/07/2024    CREATININE 0.41 (L) 01/07/2024    BUN 7 01/07/2024    CO2 31 01/07/2024     Physical exam:  Pulse 68   Temp 36.8 °C (98.3 °F)   Ht 1.702 m (5' 7\")   Wt 59.4 kg (131 lb)   SpO2 (!) 84%   BMI 20.52 kg/m²        Assessments/orders:   1. Hospital discharge follow-up        2. Parotiditis        3. Pulmonary emphysema, unspecified emphysema type (CMS/HCC)  Portable Oxygen Condenser      4. Sepsis, due to unspecified organism, unspecified whether acute organ dysfunction present (CMS/HCC)          Doing better with the parotid gland infection.  I walked her in our [address] way for one minute, her pulse ox dropped to 84%.  Portable o2 ordered     Declined all preventive testing Bill, dexa, vaccines           "

## 2024-01-23 NOTE — PROGRESS NOTES
Unable to reach patient for call back after patient's follow up appointment with PCP.   RASHAWNM with call back number for patient to call if needed   If no voicemail available call attempts x 2 were made to contact the patient to assist with any questions or concerns patient may have.

## 2024-02-07 ENCOUNTER — PATIENT OUTREACH (OUTPATIENT)
Dept: CARE COORDINATION | Facility: CLINIC | Age: 74
End: 2024-02-07
Payer: MEDICARE

## 2024-02-27 ENCOUNTER — APPOINTMENT (OUTPATIENT)
Dept: PULMONOLOGY | Facility: CLINIC | Age: 74
End: 2024-02-27
Payer: MEDICARE

## 2024-02-28 ENCOUNTER — APPOINTMENT (OUTPATIENT)
Dept: PULMONOLOGY | Facility: CLINIC | Age: 74
End: 2024-02-28
Payer: MEDICARE

## 2024-03-06 ENCOUNTER — OFFICE VISIT (OUTPATIENT)
Dept: PULMONOLOGY | Facility: CLINIC | Age: 74
End: 2024-03-06
Payer: MEDICARE

## 2024-03-06 VITALS
OXYGEN SATURATION: 95 % | RESPIRATION RATE: 16 BRPM | SYSTOLIC BLOOD PRESSURE: 124 MMHG | BODY MASS INDEX: 21.38 KG/M2 | HEART RATE: 87 BPM | DIASTOLIC BLOOD PRESSURE: 66 MMHG | TEMPERATURE: 98.3 F | HEIGHT: 66 IN | WEIGHT: 133 LBS

## 2024-03-06 DIAGNOSIS — Z78.9 ALCOHOL CONSUMPTION OF FOUR TO FIVE DRINKS PER DAY: ICD-10-CM

## 2024-03-06 DIAGNOSIS — J43.9 PULMONARY EMPHYSEMA, UNSPECIFIED EMPHYSEMA TYPE (MULTI): ICD-10-CM

## 2024-03-06 DIAGNOSIS — F17.210 TOBACCO DEPENDENCE DUE TO CIGARETTES: Primary | ICD-10-CM

## 2024-03-06 PROCEDURE — 3074F SYST BP LT 130 MM HG: CPT | Performed by: NURSE PRACTITIONER

## 2024-03-06 PROCEDURE — 1125F AMNT PAIN NOTED PAIN PRSNT: CPT | Performed by: NURSE PRACTITIONER

## 2024-03-06 PROCEDURE — 99204 OFFICE O/P NEW MOD 45 MIN: CPT | Performed by: NURSE PRACTITIONER

## 2024-03-06 PROCEDURE — 99214 OFFICE O/P EST MOD 30 MIN: CPT | Performed by: NURSE PRACTITIONER

## 2024-03-06 PROCEDURE — 3078F DIAST BP <80 MM HG: CPT | Performed by: NURSE PRACTITIONER

## 2024-03-06 PROCEDURE — 1159F MED LIST DOCD IN RCRD: CPT | Performed by: NURSE PRACTITIONER

## 2024-03-06 RX ORDER — FLUTICASONE FUROATE, UMECLIDINIUM BROMIDE AND VILANTEROL TRIFENATATE 200; 62.5; 25 UG/1; UG/1; UG/1
1 POWDER RESPIRATORY (INHALATION) DAILY
Qty: 1 EACH | Refills: 3 | Status: SHIPPED | OUTPATIENT
Start: 2024-03-06 | End: 2024-06-03

## 2024-03-06 RX ORDER — ALBUTEROL SULFATE 90 UG/1
2 AEROSOL, METERED RESPIRATORY (INHALATION) EVERY 6 HOURS PRN
Qty: 8 G | Refills: 1 | Status: SHIPPED | OUTPATIENT
Start: 2024-03-06 | End: 2024-04-04

## 2024-03-06 ASSESSMENT — ENCOUNTER SYMPTOMS
TREMORS: 0
NERVOUS/ANXIOUS: 0
COUGH: 0
WHEEZING: 1
HEADACHES: 0
DECREASED CONCENTRATION: 0
ABDOMINAL DISTENTION: 0
ABDOMINAL PAIN: 0
VOMITING: 0
CHILLS: 0
FEVER: 0
CONSTIPATION: 0
APPETITE CHANGE: 0
FATIGUE: 0
ACTIVITY CHANGE: 0
DIZZINESS: 0
UNEXPECTED WEIGHT CHANGE: 0
SHORTNESS OF BREATH: 1
WEAKNESS: 0
DIARRHEA: 0

## 2024-03-06 NOTE — PATIENT INSTRUCTIONS
Ms. Ch,    It was a pleasure to see you in clinic today.     Here are my recommendations:    INHALERS:  -STOP symbicort  -START Trerlegy inhaler 1 puff once daily (RINSE MOUTH AND SPIT AFTER USE) - call my office if too expensive so we can try to find a more afforable alternative  -Continue albuterol Inhaler 1-2 puffs every 4-6 hours as needed for shortness of breath.     TESTING:  -I ordered breathing tests (PFT & 6-minute walk), please schedule and complete these  -Lung cancer screening CT chest, ordered to be completed on/after 7/19/24, please schedule this    SMOKING CESSATION:  -please continue to work on decreasing your smoking, good work on cutting back to 1/2 pack per day from 1 pack per day    ALCOHOL USE:  -please continue to try to cut back on drinking alcohol    Thank you for visiting the Pulmonary clinic today!   Return to clinic  2 weeks after PFT/6MW or sooner if needed   Jackie Gibbons CNP    My office number is [phone] - Mitzy is my  and Elizabeth is my nurse.     [phone] - scheduling    Any test results will be discussed at next visit -- please make sure to make a follow up appt after testing.

## 2024-03-06 NOTE — PROGRESS NOTES
Patient: Rosa Ch    [id number]  : 1950 -- AGE 73 y.o.    Provider: FEDE Dotson     Location [facility]   Service Date: 3/6/2024       Department of Medicine  Division of Pulmonary, Critical Care, and Sleep Medicine         [facility]  New Visit Note      HISTORY OF PRESENT ILLNESS     The patient's referring provider is: Dr. Krista Pantoja (PCP)    Chief complaint: Rosa Ch is a 73 y.o. female who presents to a [facility] for an evaluation with concerns of COPD (Patient is here for initial visit. Patient states she gets shortness of breath on exertion and using stairs. Patient states she has dry cough. Patient is using a rescue inhaler 4x daily. Patient is on oxygen using 2L in morning and evening. Patient does not have a cpap. Patient has 1 cat and 1 dog. Patient is a current smoker 1/2 ppd. For 45 yrs. ). I have independently interviewed and examined the patient in the office and reviewed available records.    HISTORY OF PRESENT ILLNESS:  Ms. Ch is a 73 year old  female (current smoker, 1 PPD since age 27, ~46 pack year history) with a PMHx of anxiety, HTN, HLD, alcohol use disorder, COPD (no PFTs on file but emphysema on imaging). Here for initial evaluation for COPD.     DATE OF LAST VISIT: N/A, NPV    Current History  Patient is here as a referral from her PCP - Dr. Pantoja    Patient reports that she was told recently she has COPD and needs to see pulmonary; has never had PFTs completed.     Patient denies SOB at rest but reports REDMAN with walking, especially up and down stairs in her condo. She denies frequent cough, only has occasional non-productive cough. She has occasional wheezing. She has been on symbicort & albuterol however has been using too frequently d/t not having good relief and she is running out before her insurance will cover more. She reports using  "Symbicort 2 puffs BID as prescribed but states she received a 90 day supply in 01/2024 and is out already. She uses her albuterol more frequently because she does not feel the medication coming out of the inhaler so she doesn't think she is getting it. She denies orthopnea or BLE edema. Denies chest pain or acid reflux. She has occasional nasal congestion/post nasal drip that she will use benadryl for -- discussed using zyrtec or claritin instead of benadryl d/t side effects. She reports she snores at night, sleeps on 1 pillow, denies apnea or waking up coughing/gasping for air at night, and feels well rested upon waking in the morning.     Patient was seen by PCP 01/2024 and per note desaturated to 84% with 1 minute walk in hallway on RA so was prescribed oxygen. She reports she purchased used portable concentrator and she really only uses is before going up and down her stairs at home to \"give herself enough air\". Will also take with her in car on road trips.     Pulmonary hospitalization history:  -age 29 inhaled micro-glass beads, caused PNA  -2 hospital admissions for pneumonia/respiratory issues, thinks she was on BiPAP/CPAP during one admission (unsure how long ago)    Social:  -smoking: current smoker, 1 PPD since age 27, 01/2024 cut back to 1/2 PPF  -ETOH: daily, 4 beers/day, was at least 6 beers/day prior  -illicit drugs: denies  -occupation: retired; worked as  around autobody filler and sealants  -Exposures: autobody filler, sealants; reports around age 29 inhaled micro-glass beads      REVIEW OF SYSTEMS     REVIEW OF SYSTEMS  Review of Systems   Constitutional:  Negative for activity change, appetite change, chills, fatigue, fever and unexpected weight change.   HENT:  Negative for congestion and postnasal drip.    Respiratory:  Positive for shortness of breath and wheezing. Negative for cough.    Cardiovascular:  Negative for chest pain and leg swelling.   Gastrointestinal:  " Negative for abdominal distention, abdominal pain, constipation, diarrhea and vomiting.   Neurological:  Negative for dizziness, tremors, weakness and headaches.   Psychiatric/Behavioral:  Negative for decreased concentration. The patient is not nervous/anxious.        ALLERGIES AND MEDICATIONS     ALLERGIES  Allergies   Allergen Reactions    Peanut Unknown    Penicillins Unknown    Sulfa (Sulfonamide Antibiotics) Unknown       MEDICATIONS  Current Outpatient Medications   Medication Sig Dispense Refill    albuterol (ProAir HFA) 90 mcg/actuation inhaler Inhale 2 puffs every 6 hours if needed for wheezing. 8 g 1    amLODIPine (Norvasc) 2.5 mg tablet Take 1 tablet (2.5 mg) by mouth once daily. 90 tablet 3    budesonide-formoteroL (Symbicort) 160-4.5 mcg/actuation inhaler Inhale 2 puffs 2 times a day. 3 each 3    citalopram (CeleXA) 20 mg tablet Take 1 tablet (20 mg) by mouth once daily. 90 tablet 3    lisinopril 40 mg tablet Take 1 tablet (40 mg) by mouth once daily. 90 tablet 3    albuterol (ProAir HFA) 90 mcg/actuation inhaler Inhale 2 puffs every 6 hours if needed for wheezing. (Patient not taking: Reported on 3/6/2024) 24 g 3    oxyCODONE (Roxicodone) 5 mg immediate release tablet Take 1 tablet (5 mg) by mouth every 6 hours if needed for moderate pain (4 - 6). K11.20 (Patient not taking: Reported on 3/6/2024) 20 tablet 0     No current facility-administered medications for this visit.       PAST HISTORY     PAST MEDICAL HISTORY  She  has a past medical history of COPD (chronic obstructive pulmonary disease) (CMS/[facility]) and Hypertension.    PAST SURGICAL HISTORY  Past Surgical History:   Procedure Laterality Date    FACIAL COSMETIC SURGERY      TUBAL LIGATION  03/16/2016    Tubal Ligation       IMMUNIZATION HISTORY  Immunization History   Administered Date(s) Administered    Influenza, Unspecified 02/27/2019    Influenza, seasonal, injectable 02/27/2019    Andreina SARS-CoV-2 Vaccination 08/11/2021, 11/08/2021     "Pneumococcal conjugate vaccine, 13-valent (PREVNAR 13) 12/08/2020    Pneumococcal polysaccharide vaccine, 23-valent, age 2 years and older (PNEUMOVAX 23) 11/20/2019       FAMILY HISTORY  Family History   Problem Relation Name Age of Onset    Breast cancer Mother      Other (bladder cancer) Mother      Hepatitis Sister      Arthritis Sister         PHYSICAL EXAM     VITAL SIGNS: /66   Pulse 87   Temp 36.8 °C (98.3 °F)   Resp 16 Comment: RA  Ht 1.664 m (5' 5.5\")   Wt 60.3 kg (133 lb)   SpO2 95%   BMI 21.80 kg/m²      PREVIOUS WEIGHTS:  Wt Readings from Last 3 Encounters:   03/06/24 60.3 kg (133 lb)   01/23/24 59.4 kg (131 lb)   01/07/24 59.4 kg (131 lb)       Physical Exam  Vitals reviewed.   Constitutional:       General: She is not in acute distress.     Comments: Appears older than current age   HENT:      Nose: Nose normal.      Mouth/Throat:      Mouth: Mucous membranes are moist.   Eyes:      General: No scleral icterus.  Cardiovascular:      Rate and Rhythm: Normal rate and regular rhythm.      Pulses: Normal pulses.      Heart sounds: Normal heart sounds. No murmur heard.  Pulmonary:      Effort: Pulmonary effort is normal. No respiratory distress.      Breath sounds: No rhonchi or rales.      Comments: Diminished throughout all fields; faint end expiratory wheezes   Abdominal:      General: Bowel sounds are normal.      Palpations: Abdomen is soft.   Musculoskeletal:         General: Normal range of motion.      Cervical back: Normal range of motion and neck supple.      Right lower leg: No edema.      Left lower leg: No edema.   Skin:     General: Skin is warm and dry.   Neurological:      Mental Status: She is alert and oriented to person, place, and time.         RESULTS/DATA     Pulmonary Function Test Results     No PFT on record    Chest Radiograph     No results found for this or any previous visit from the past 365 days.      Chest CT Scan     CT lung screening low dose " 07/19/2023    Narrative  Interpreted By:  FLEX GARY MD  MRN: [id number]  Patient Name: AARON PEREIRA    STUDY:   CT CHEST LOW DOSE FOR LUNG SCREENING WO CONTRAST;  7/19/2023 11:18  am    INDICATION:  hx heavy smoking.    COMPARISON:  02/24/2022    ACCESSION NUMBER(S):  [id number]    ORDERING CLINICIAN:  GABRIELA SANCHEZ    TECHNIQUE:  Helical data acquisition of the chest was obtained without IV  contrast material.  Images were reformatted in axial, coronal, and  sagittal planes.    FINDINGS:  LUNGS AND AIRWAYS:  The trachea and central airways are patent. No endobronchial lesion  is seen. Small amount of mucus and secretion trachea.    There is moderate to severe bilateral upper lung predominant  centrilobular and paraseptal emphysema.There is no focal  consolidation, pleural effusion, or pneumothorax. There is biapical  pleuroparenchymal scarring.    4 mm right upper lobe nodule, image 69/319, stable.  3 mm right lower lobe nodule, image 220, stable.  3 mm pleural-based nodular density in the left upper lobe, image  64/319, stable.  3 mm nodule air density in the lingula, image 183, stable.    MEDIASTINUM AND EVAN, LOWER NECK AND AXILLA:  The visualized thyroid gland is within normal limits.  No evidence of thoracic lymphadenopathy by CT criteria.  Esophagus appears within normal limits as seen.    HEART AND VESSELS:  The thoracic aorta normal in course and caliber.There are mild to  moderate scattered calcified atherosclerosis present.  Main pulmonary artery and its branches are normal in caliber.  No coronary artery calcifications are seen. Please note, the study is  not optimized for evaluation of coronary arteries.  The cardiac chambers are not enlarged.  There is no pericardial effusion seen.    UPPER ABDOMEN:  The visualized subdiaphragmatic structures demonstrate no remarkable  findings.        CHEST WALL AND OSSEOUS STRUCTURES:  Chest wall is within normal limits.  No acute osseous  "pathology.There are no suspicious osseous lesions.    Impression  1.  Few small bilateral noncalcified pulmonary nodules measuring up  to 4 mm, likely benign. Continued screening with low-dose noncontrast  chest CT in 12 months (from current date) is recommended.  2. Moderate to severe upper lung predominant emphysema. Biapical  pleural parenchyma scarring, unchanged.      LUNG RADS CATEGORY:  Lung-RADS 2 (Benign Appearance or Indolent behavior): Continue with  annual screening in 12 months,  CT Chest Lung Ca Screen Initial  or Follow up LR1/LR2/Continuation of Screening Low Dose recommended  as per American College of Radiology Guidelines Lung-RADS Version  2022.      Echocardiogram & Cardiac Studies     No results found for this or any previous visit from the past 365 days.       Labwork & Pathology   Complete Blood Count  Lab Results   Component Value Date    WBC 13.1 (H) 01/07/2024    HGB 16.0 01/07/2024    HCT 46.5 (H) 01/07/2024    MCV 96 01/07/2024     01/07/2024       Peripheral Eosinophil Count:   Eosinophils Absolute   Date Value   01/07/2024 0.07 x10*3/uL   01/11/2023 0.02 x10E9/L   05/19/2020 0.12 x10E9/L       Serum Immunoglobulin E:    No results found for: \"IGE\"     Metabolic Parameters  Sodium   Date/Time Value Ref Range Status   01/07/2024 07:02  136 - 145 mmol/L Final     Potassium   Date/Time Value Ref Range Status   01/07/2024 07:02 AM 3.7 3.5 - 5.3 mmol/L Final     Chloride   Date/Time Value Ref Range Status   01/07/2024 07:02 AM 97 (L) 98 - 107 mmol/L Final     Bicarbonate   Date/Time Value Ref Range Status   01/07/2024 07:02 AM 31 21 - 32 mmol/L Final     Anion Gap   Date/Time Value Ref Range Status   01/07/2024 07:02 AM 13 10 - 20 mmol/L Final     Urea Nitrogen   Date/Time Value Ref Range Status   01/07/2024 07:02 AM 7 6 - 23 mg/dL Final     Creatinine   Date/Time Value Ref Range Status   01/07/2024 07:02 AM 0.41 (L) 0.50 - 1.05 mg/dL Final     GFR Female   Date/Time Value " Ref Range Status   01/11/2023 01:43 AM >90 >90 mL/min/1.73m2 Final     Comment:      CALCULATIONS OF ESTIMATED GFR ARE PERFORMED   USING THE 2021 CKD-EPI STUDY REFIT EQUATION   WITHOUT THE RACE VARIABLE FOR THE IDMS-TRACEABLE   CREATININE METHODS.    https://jasn.asnjournals.org/content/early/2021/09/22/ASN.6303412033       Glucose   Date/Time Value Ref Range Status   01/07/2024 07:02 AM 87 74 - 99 mg/dL Final     Calcium   Date/Time Value Ref Range Status   01/07/2024 07:02 AM 8.9 8.6 - 10.3 mg/dL Final     AST   Date/Time Value Ref Range Status   01/07/2024 07:02 AM 11 9 - 39 U/L Final     ALT   Date/Time Value Ref Range Status   01/07/2024 07:02 AM 12 7 - 45 U/L Final     Comment:     Patients treated with Sulfasalazine may generate falsely decreased results for ALT.       Bronchoscopy & Sputum Cultures       ASSESSMENT/PLAN   Ms. Ch is a 73 year old  female (current smoker, 1 PPD since age 27, ~46 pack year history) with a PMHx of anxiety, HTN, HLD, alcohol use disorder, COPD (no PFTs on file but emphysema on imaging). Here for initial evaluation for COPD.     Problem List and Orders  Problem List Items Addressed This Visit             ICD-10-CM    COPD (chronic obstructive pulmonary disease) (CMS/[facility]) J44.9    Relevant Medications    fluticasone-umeclidin-vilanter (Trelegy Ellipta) 200-62.5-25 mcg blister with device    albuterol (ProAir HFA) 90 mcg/actuation inhaler    Other Relevant Orders    Complete Pulmonary Function Test Pre/Post Bronchodialator (Spirometry Pre/Post/DLCO/Lung Volumes)    Pulmonary Stress Test (6 Min. 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  microglass beads at age 29; currently on symbicort & albuterol and using more frequently than prescribed as they are not effective; REDMAN with walking up and down stairs; on O2 PRN per PCP  -ordered PFT & 6MW  -Stop symbicort  -Start Trelegy 1 puff daily - will call if too expensive for alternative  -Continue albuterol 1-2 puffs q4-6hr PRN SOB or wheezing   -Discussed nebulized treatments since using albuterol so often, she declined at this time    2) O2 use - per PCP note 01/2024, did 1 minute walk in office and patient destaurated ot 84% so ordered O2; patient purchased used concentrator; 95% on RA during visit   -ordered 6MW to determine O2 need    3) Lung cancer screening - LDCT done 7/19/23 with multiple stable 3-4mm nodules  -ordered LDCT to be done on/after 7/19/24    4) Tobacco dependence d/t cigarettes - current smoker, 1 PPD since age 27, 01/2024 cut back to 1/2 PPD; ~46 pack year history  -# Smoking cessation: Patient is currently smoking -- >5 minutes smoking cessation counseling   - offered pharmacologic options to assist with quitting, not interested; has tried in the past, allergic to adhesive in nicotine patches; not appropriate for Chantix d/t history of anxiety  -If use not decreased at next visit will discuss smoking cessation clinic for assistance     5) ETOH use disorder - reports drinks 4 beers every night, was drinking 6 beers/night  -encouraged to decrease ETOH use       RTC 2 weeks after PFT/6MW

## 2024-04-02 ENCOUNTER — APPOINTMENT (OUTPATIENT)
Dept: RESPIRATORY THERAPY | Facility: HOSPITAL | Age: 74
End: 2024-04-02
Payer: MEDICARE

## 2024-04-03 ENCOUNTER — TELEPHONE (OUTPATIENT)
Dept: PULMONOLOGY | Facility: HOSPITAL | Age: 74
End: 2024-04-03
Payer: MEDICARE

## 2024-04-03 DIAGNOSIS — J43.9 PULMONARY EMPHYSEMA, UNSPECIFIED EMPHYSEMA TYPE (MULTI): ICD-10-CM

## 2024-04-03 NOTE — TELEPHONE ENCOUNTER
Spoke with pt regarding scheduling her PFT's. Pt has scheduled and canceled the testing twice. She stated that the first time there was too much snow and the second time she was sick. When asking her to reschedule the testing, she stated that she doesn't like tests. Pt was educated on why these breathing tests are important. She laughed, but verbalized understanding. She gave several reasons why she doesn't think she needs them. She verbalized that she will see Jackie Gibbons CNP again and Padmini can listen to her lungs at that time. A FUV on 5/2/24 was scheduled with Jackie Gibbons CNP. Pt confirmed date, time and location. She also mentioned that she is feeling great on the Trelegy. She states that some days she doesn't need her albuterol inhaler. She also mentioned that she no longer needs the motorized carts at the grocery store.

## 2024-04-04 RX ORDER — ALBUTEROL SULFATE 90 UG/1
AEROSOL, METERED RESPIRATORY (INHALATION)
Qty: 18 G | Refills: 0 | Status: SHIPPED | OUTPATIENT
Start: 2024-04-04 | End: 2024-05-09

## 2024-04-05 ENCOUNTER — PATIENT OUTREACH (OUTPATIENT)
Dept: CARE COORDINATION | Facility: CLINIC | Age: 74
End: 2024-04-05
Payer: MEDICARE

## 2024-04-28 DIAGNOSIS — I10 PRIMARY HYPERTENSION: ICD-10-CM

## 2024-04-29 RX ORDER — LISINOPRIL 40 MG/1
40 TABLET ORAL DAILY
Qty: 100 TABLET | Refills: 2 | Status: SHIPPED | OUTPATIENT
Start: 2024-04-29

## 2024-04-29 RX ORDER — AMLODIPINE BESYLATE 2.5 MG/1
2.5 TABLET ORAL DAILY
Qty: 100 TABLET | Refills: 2 | Status: SHIPPED | OUTPATIENT
Start: 2024-04-29

## 2024-04-29 RX ORDER — DOXYCYCLINE HYCLATE 100 MG
TABLET ORAL
COMMUNITY
Start: 2024-04-22 | End: 2024-05-29 | Stop reason: ALTCHOICE

## 2024-05-02 ENCOUNTER — APPOINTMENT (OUTPATIENT)
Dept: PULMONOLOGY | Facility: HOSPITAL | Age: 74
End: 2024-05-02
Payer: MEDICARE

## 2024-05-07 ENCOUNTER — OFFICE VISIT (OUTPATIENT)
Dept: WOUND CARE | Facility: CLINIC | Age: 74
End: 2024-05-07
Payer: MEDICARE

## 2024-05-07 PROCEDURE — 99215 OFFICE O/P EST HI 40 MIN: CPT

## 2024-05-14 ENCOUNTER — APPOINTMENT (OUTPATIENT)
Dept: WOUND CARE | Facility: CLINIC | Age: 74
End: 2024-05-14
Payer: MEDICARE

## 2024-05-29 ENCOUNTER — OFFICE VISIT (OUTPATIENT)
Dept: PULMONOLOGY | Facility: CLINIC | Age: 74
End: 2024-05-29
Payer: MEDICARE

## 2024-05-29 VITALS
BODY MASS INDEX: 22.08 KG/M2 | HEART RATE: 104 BPM | RESPIRATION RATE: 16 BRPM | WEIGHT: 137.4 LBS | HEIGHT: 66 IN | DIASTOLIC BLOOD PRESSURE: 76 MMHG | TEMPERATURE: 97.8 F | SYSTOLIC BLOOD PRESSURE: 129 MMHG | OXYGEN SATURATION: 96 %

## 2024-05-29 DIAGNOSIS — J40 BRONCHITIS: ICD-10-CM

## 2024-05-29 DIAGNOSIS — J30.9 ALLERGIC RHINITIS, UNSPECIFIED SEASONALITY, UNSPECIFIED TRIGGER: ICD-10-CM

## 2024-05-29 DIAGNOSIS — F17.210 TOBACCO DEPENDENCE DUE TO CIGARETTES: ICD-10-CM

## 2024-05-29 DIAGNOSIS — J43.9 PULMONARY EMPHYSEMA, UNSPECIFIED EMPHYSEMA TYPE (MULTI): Primary | ICD-10-CM

## 2024-05-29 DIAGNOSIS — Z78.9 ALCOHOL CONSUMPTION OF FOUR TO FIVE DRINKS PER DAY: ICD-10-CM

## 2024-05-29 PROCEDURE — 99214 OFFICE O/P EST MOD 30 MIN: CPT | Performed by: NURSE PRACTITIONER

## 2024-05-29 PROCEDURE — 3078F DIAST BP <80 MM HG: CPT | Performed by: NURSE PRACTITIONER

## 2024-05-29 PROCEDURE — 1159F MED LIST DOCD IN RCRD: CPT | Performed by: NURSE PRACTITIONER

## 2024-05-29 PROCEDURE — 3074F SYST BP LT 130 MM HG: CPT | Performed by: NURSE PRACTITIONER

## 2024-05-29 RX ORDER — CETIRIZINE HYDROCHLORIDE 10 MG/1
10 TABLET ORAL DAILY
Qty: 30 TABLET | Refills: 3 | Status: SHIPPED | OUTPATIENT
Start: 2024-05-29

## 2024-05-29 RX ORDER — DOXYCYCLINE HYCLATE 100 MG
100 TABLET ORAL 2 TIMES DAILY
Qty: 20 TABLET | Refills: 0 | Status: SHIPPED | OUTPATIENT
Start: 2024-05-29 | End: 2024-06-08

## 2024-05-29 RX ORDER — PREDNISONE 20 MG/1
40 TABLET ORAL DAILY
Qty: 10 TABLET | Refills: 0 | Status: SHIPPED | OUTPATIENT
Start: 2024-05-29 | End: 2024-06-03

## 2024-05-29 NOTE — PROGRESS NOTES
" Patient: Rosa Ch    [id number]  : 1950 -- AGE 73 y.o.    Provider: FEDE Dotson     Location [facility]   Service Date: 2024       Department of Medicine  Division of Pulmonary, Critical Care, and Sleep Medicine       [facility]  Established Patient Visit Note      HISTORY OF PRESENT ILLNESS     The patient's referring provider is: No ref. provider found    Chief complaint: Rosa Ch is a 73 y.o. female who presents to a [facility] for an evaluation with concerns of COPD (Patient is here for follow up visit. Patient states she has some shortness of breath. Patient is having a productive cough. Patient is on 2L oxygen when needed. Patient is a current smoker 3/4 ppd. Patient has rescue inhaler using when needed states her Trelegy is working best for her. Patient did not complete PFT testing. ).     HISTORY OF PRESENT ILLNESS  Ms. Ch is a 73 year old  female (current smoker, 1 PPD since age 27, ~46 pack year history) with a PMHx of anxiety, HTN, HLD, alcohol use disorder, COPD (no PFTs on file but emphysema on imaging). Here for follow-up visit for emphysema.      I have independently interviewed and examined the patient in the office and reviewed available records.    DATE OF LAST VISIT: 3/6/24    Current History    On today's visit, the patient reports her breathing is good however noted to have coarse cough and conversational dyspnea. She states the cough started about 4-5 days ago, is productive. Also having increased REDMAN and wheezing for that long. She has been using Trelegy one puff once daily and albuterol about one time/day. She has a portable oxygen concentrator that she uses when she feels like she needs it but does not monitor her oxygen level, stating \"I use it for about 5 minutes in the morning when I first wake up and then use my inhalers\". She denies " chest pain/tightness. She is having increased nasal congestion/post-nasal drip that is not improved with claritin she is taking, does not like nasal sprays. She denies fever or chills. Possible known sick contact, feels like a friend had similar symptoms recently.     Patient did not have PFT or 6MW testing completed because she said she has problems walking and doesn't know if she could walk that long. Encouraged her to complete testing so we can see the function of her lungs and also determine if she needs oxygen and how much she should be using if so since no formal testing done to determine that.     Smoking: still smoking, 0.5-0.75 PPD     ETOH use: still drinking daily, 4 beer and glass of wine every day     Denies recent visits to UC, ER, PCP for breathing.      Prior Notes & History     3/6/23:   Current History  Patient is here as a referral from her PCP - Dr. Pantoja     Patient reports that she was told recently she has COPD and needs to see pulmonary; has never had PFTs completed.      Patient denies SOB at rest but reports REDMAN with walking, especially up and down stairs in her condo. She denies frequent cough, only has occasional non-productive cough. She has occasional wheezing. She has been on symbicort & albuterol however has been using too frequently d/t not having good relief and she is running out before her insurance will cover more. She reports using Symbicort 2 puffs BID as prescribed but states she received a 90 day supply in 01/2024 and is out already. She uses her albuterol more frequently because she does not feel the medication coming out of the inhaler so she doesn't think she is getting it. She denies orthopnea or BLE edema. Denies chest pain or acid reflux. She has occasional nasal congestion/post nasal drip that she will use benadryl for -- discussed using zyrtec or claritin instead of benadryl d/t side effects. She reports she snores at night, sleeps on 1 pillow, denies apnea or waking  "up coughing/gasping for air at night, and feels well rested upon waking in the morning.      Patient was seen by PCP 01/2024 and per note desaturated to 84% with 1 minute walk in hallway on RA so was prescribed oxygen. She reports she purchased used portable concentrator and she really only uses is before going up and down her stairs at home to \"give herself enough air\". Will also take with her in car on road trips.      Pulmonary hospitalization history:  -age 29 inhaled micro-glass beads, caused PNA  -2 hospital admissions for pneumonia/respiratory issues, thinks she was on BiPAP/CPAP during one admission (unsure how long ago)     Social:  -smoking: current smoker, 1 PPD since age 27, 01/2024 cut back to 1/2 PPF  -ETOH: daily, 4 beers/day, was at least 6 beers/day prior  -illicit drugs: denies  -occupation: retired; worked as  around autobody filler and sealants  -Exposures: autobody filler, sealants; reports around age 29 inhaled micro-glass beads    Assessment and Plan / Recommendations:  1) Emphysema (likely COPD, no PFTs on file) - mod/severe emphysema on imaging; significant smoking history, 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 microglass beads at age 29; currently on symbicort & albuterol and using more frequently than prescribed as they are not effective; REDMAN with walking up and down stairs; on O2 PRN per PCP  -ordered PFT & 6MW  -Stop symbicort  -Start Trelegy 1 puff daily - will call if too expensive for alternative  -Continue albuterol 1-2 puffs q4-6hr PRN SOB or wheezing   -Discussed nebulized treatments since using albuterol so often, she declined at this time     2) O2 use - per PCP note 01/2024, did 1 minute walk in office and patient destaurated ot 84% so ordered O2; patient purchased used concentrator; 95% on RA during visit   -ordered 6MW to determine O2 need     3) Lung cancer screening - LDCT done 7/19/23 with multiple stable 3-4mm nodules  -ordered LDCT to be " done on/after 7/19/24     4) Tobacco dependence d/t cigarettes - current smoker, 1 PPD since age 27, 01/2024 cut back to 1/2 PPD; ~46 pack year history  -# Smoking cessation: Patient is currently smoking -- >5 minutes smoking cessation counseling   - offered pharmacologic options to assist with quitting, not interested; has tried in the past, allergic to adhesive in nicotine patches; not appropriate for Chantix d/t history of anxiety  -If use not decreased at next visit will discuss smoking cessation clinic for assistance      5) ETOH use disorder - reports drinks 4 beers every night, was drinking 6 beers/night  -encouraged to decrease ETOH use     REVIEW OF SYSTEMS     REVIEW OF SYSTEMS  Review of Systems  10-point ROS complete and negative except as documented in HPI       ALLERGIES AND MEDICATIONS     ALLERGIES  Allergies   Allergen Reactions    Peanut Unknown    Penicillins Unknown    Sulfa (Sulfonamide Antibiotics) Unknown       MEDICATIONS  Current Outpatient Medications   Medication Sig Dispense Refill    albuterol 90 mcg/actuation inhaler USE 2 INHALATIONS BY MOUTH EVERY 4 TO 6 HOURS AS NEEDED 17 g 9    amLODIPine (Norvasc) 2.5 mg tablet Take 1 tablet (2.5 mg) by mouth once daily. 100 tablet 2    citalopram (CeleXA) 20 mg tablet Take 1 tablet (20 mg) by mouth once daily. 90 tablet 3    fluticasone-umeclidin-vilanter (Trelegy Ellipta) 200-62.5-25 mcg blister with device Inhale 1 puff once daily. Rinse mouth with water after use to reduce aftertaste and incidence of candidiasis. Do not swallow. 1 each 3    lisinopril 40 mg tablet Take 1 tablet (40 mg) by mouth once daily. 100 tablet 2    doxycycline (Vibra-Tabs) 100 mg tablet TAKE ONE TABLET BY MOUTH EVERY 12 HOURS FOR 10 DAYS. TAKE WITH FOOD. FINISH ALL MEDICATION       No current facility-administered medications for this visit.       PAST HISTORY     PAST MEDICAL HISTORY  She  has a past medical history of COPD (chronic obstructive pulmonary disease)  "(Multi) and Hypertension.    PAST SURGICAL HISTORY  Past Surgical History:   Procedure Laterality Date    FACIAL COSMETIC SURGERY      TUBAL LIGATION  03/16/2016    Tubal Ligation       IMMUNIZATION HISTORY  Immunization History   Administered Date(s) Administered    Influenza, Unspecified 02/27/2019    Influenza, seasonal, injectable 02/27/2019    Andreina SARS-CoV-2 Vaccination 08/11/2021, 11/08/2021    Pneumococcal conjugate vaccine, 13-valent (PREVNAR 13) 12/08/2020    Pneumococcal polysaccharide vaccine, 23-valent, age 2 years and older (PNEUMOVAX 23) 11/20/2019         PHYSICAL EXAM     VITAL SIGNS: /76   Pulse 104   Temp 36.6 °C (97.8 °F)   Resp 16   Ht 1.664 m (5' 5.5\")   Wt 62.3 kg (137 lb 6.4 oz)   SpO2 96%   BMI 22.52 kg/m²      PREVIOUS WEIGHTS:  Wt Readings from Last 3 Encounters:   05/29/24 62.3 kg (137 lb 6.4 oz)   03/06/24 60.3 kg (133 lb)   01/23/24 59.4 kg (131 lb)       Physical Exam  Vitals reviewed.   Constitutional:       General: She is not in acute distress.     Appearance: Normal appearance.   HENT:      Nose: Congestion present.      Mouth/Throat:      Mouth: Mucous membranes are moist.   Cardiovascular:      Rate and Rhythm: Normal rate and regular rhythm.      Pulses: Normal pulses.      Heart sounds: Normal heart sounds.   Pulmonary:      Breath sounds: Wheezing and rhonchi present.      Comments: Coarse productive cough   Skin:     General: Skin is warm and dry.   Neurological:      Mental Status: She is alert and oriented to person, place, and time.         RESULTS/DATA     Pulmonary Function Test Results     No PFT on record    Chest Radiograph     No results found for this or any previous visit from the past 365 days.      Chest CT Scan     CT lung screening low dose 07/19/2023    Narrative  Interpreted By:  FLEX GARY MD  MRN: [id number]  Patient Name: AARON PEREIRA    STUDY:   CT CHEST LOW DOSE FOR LUNG SCREENING WO CONTRAST;  7/19/2023 " 11:18  am    INDICATION:  hx heavy smoking.    COMPARISON:  02/24/2022    ACCESSION NUMBER(S):  [id number]    ORDERING CLINICIAN:  GABRIELA SANCHEZ    TECHNIQUE:  Helical data acquisition of the chest was obtained without IV  contrast material.  Images were reformatted in axial, coronal, and  sagittal planes.    FINDINGS:  LUNGS AND AIRWAYS:  The trachea and central airways are patent. No endobronchial lesion  is seen. Small amount of mucus and secretion trachea.    There is moderate to severe bilateral upper lung predominant  centrilobular and paraseptal emphysema.There is no focal  consolidation, pleural effusion, or pneumothorax. There is biapical  pleuroparenchymal scarring.    4 mm right upper lobe nodule, image 69/319, stable.  3 mm right lower lobe nodule, image 220, stable.  3 mm pleural-based nodular density in the left upper lobe, image  64/319, stable.  3 mm nodule air density in the lingula, image 183, stable.    MEDIASTINUM AND EVAN, LOWER NECK AND AXILLA:  The visualized thyroid gland is within normal limits.  No evidence of thoracic lymphadenopathy by CT criteria.  Esophagus appears within normal limits as seen.    HEART AND VESSELS:  The thoracic aorta normal in course and caliber.There are mild to  moderate scattered calcified atherosclerosis present.  Main pulmonary artery and its branches are normal in caliber.  No coronary artery calcifications are seen. Please note, the study is  not optimized for evaluation of coronary arteries.  The cardiac chambers are not enlarged.  There is no pericardial effusion seen.    UPPER ABDOMEN:  The visualized subdiaphragmatic structures demonstrate no remarkable  findings.        CHEST WALL AND OSSEOUS STRUCTURES:  Chest wall is within normal limits.  No acute osseous pathology.There are no suspicious osseous lesions.    Impression  1.  Few small bilateral noncalcified pulmonary nodules measuring up  to 4 mm, likely benign. Continued screening with low-dose  "noncontrast  chest CT in 12 months (from current date) is recommended.  2. Moderate to severe upper lung predominant emphysema. Biapical  pleural parenchyma scarring, unchanged.      LUNG RADS CATEGORY:  Lung-RADS 2 (Benign Appearance or Indolent behavior): Continue with  annual screening in 12 months,  CT Chest Lung Ca Screen Initial  or Follow up LR1/LR2/Continuation of Screening Low Dose recommended  as per American College of Radiology Guidelines Lung-RADS Version  2022.      Echocardiogram & Cardiac Studies     No results found for this or any previous visit from the past 365 days.       Labwork & Pathology   Complete Blood Count  Lab Results   Component Value Date    WBC 13.1 (H) 01/07/2024    HGB 16.0 01/07/2024    HCT 46.5 (H) 01/07/2024    MCV 96 01/07/2024     01/07/2024       Peripheral Eosinophil Count:   Eosinophils Absolute   Date Value   01/07/2024 0.07 x10*3/uL   01/11/2023 0.02 x10E9/L   05/19/2020 0.12 x10E9/L       Serum Immunoglobulin E:    No results found for: \"IGE\"     Metabolic Parameters  Sodium   Date/Time Value Ref Range Status   01/07/2024 07:02  136 - 145 mmol/L Final     Potassium   Date/Time Value Ref Range Status   01/07/2024 07:02 AM 3.7 3.5 - 5.3 mmol/L Final     Chloride   Date/Time Value Ref Range Status   01/07/2024 07:02 AM 97 (L) 98 - 107 mmol/L Final     Bicarbonate   Date/Time Value Ref Range Status   01/07/2024 07:02 AM 31 21 - 32 mmol/L Final     Anion Gap   Date/Time Value Ref Range Status   01/07/2024 07:02 AM 13 10 - 20 mmol/L Final     Urea Nitrogen   Date/Time Value Ref Range Status   01/07/2024 07:02 AM 7 6 - 23 mg/dL Final     Creatinine   Date/Time Value Ref Range Status   01/07/2024 07:02 AM 0.41 (L) 0.50 - 1.05 mg/dL Final     GFR Female   Date/Time Value Ref Range Status   01/11/2023 01:43 AM >90 >90 mL/min/1.73m2 Final     Comment:      CALCULATIONS OF ESTIMATED GFR ARE PERFORMED   USING THE 2021 CKD-EPI STUDY REFIT EQUATION   WITHOUT THE RACE " VARIABLE FOR THE IDMS-TRACEABLE   CREATININE METHODS.    https://jasn.asnjournals.org/content/early/2021/09/22/ASN.5196578757       Glucose   Date/Time Value Ref Range Status   01/07/2024 07:02 AM 87 74 - 99 mg/dL Final     Calcium   Date/Time Value Ref Range Status   01/07/2024 07:02 AM 8.9 8.6 - 10.3 mg/dL Final     AST   Date/Time Value Ref Range Status   01/07/2024 07:02 AM 11 9 - 39 U/L Final     ALT   Date/Time Value Ref Range Status   01/07/2024 07:02 AM 12 7 - 45 U/L Final     Comment:     Patients treated with Sulfasalazine may generate falsely decreased results for ALT.       Bronchoscopy & Pathology/Cultures       ASSESSMENT/PLAN     Ms. Ch is a 73 year old  female (current smoker, 1 PPD since age 27, ~46 pack year history) with a PMHx of anxiety, HTN, HLD, alcohol use disorder, COPD (no PFTs on file but emphysema on imaging). Here for follow-up visit for emphysema.      Problem List and Orders  Problem List Items Addressed This Visit             ICD-10-CM    COPD (chronic obstructive pulmonary disease) (Multi) - Primary J44.9    Relevant Medications    predniSONE (Deltasone) 20 mg tablet    doxycycline (Vibra-Tabs) 100 mg tablet    Tobacco dependence due to cigarettes F17.210    Alcohol consumption of four to five drinks per day Z78.9     Other Visit Diagnoses         Codes    Bronchitis     J40    Relevant Medications    predniSONE (Deltasone) 20 mg tablet    doxycycline (Vibra-Tabs) 100 mg tablet    Allergic rhinitis, unspecified seasonality, unspecified trigger     J30.9    Relevant Medications    cetirizine (ZyrTEC) 10 mg tablet            Assessment and Plan / Recommendations:  1) Emphysema (likely COPD, no PFTs on file) - mod/severe emphysema on imaging; significant smoking history, 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 beads 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29; REDMAN with walking up and down stairs; on O2 PRN per PCP; improved since changed to Trelegy from Symbicort 03/2024  -did not complete  PFT/6MW after previous visit, encouraged her to schedule and complete  -Continue Trelegy 1 puff daily & PRN albuterol    2) Bronchitis - possible COPD exacerbation; coarse productive cough, wheezing and rhonchi on exam; possible recent sick contacts  - start doxycycline BID x 10 days (did not use azithromycin d/t concern for prolonged QT with citalopram)   - prednisone burse 40 mg x 5 days     3) O2 use - per PCP note 01/2024, did 1 minute walk in office and patient destaurated ot 84% so ordered O2; patient purchased used concentrator; 95% on RA last visit, 96% on RA today; patient only randomly using at home when she feels she might need it and not checking O2   -ordered 6MW to determine O2 need at last visit but she did not schedule/complete, educated importance of testing to determine if she needs O2 and how much she should be using if so, she verbalized understanding and said she would schedule      4) Lung cancer screening - LDCT done 7/19/23 with multiple stable 3-4mm nodules  -ordered LDCT to be done on/after 7/19/24, educated patient will need to call to schedule and provided with number, she verbalized understanding     5) Tobacco dependence d/t cigarettes - current smoker, 1 PPD since age 27, 01/2024 cut back to 1/2 PPD; ~46 pack year history  -# Smoking cessation: Patient is currently smoking -- >5 minutes smoking cessation counseling   - offered pharmacologic options to assist with quitting, not interested; has tried in the past, allergic to adhesive in nicotine patches; not appropriate for Chantix d/t history of anxiety  -not interested in assistance with quitting at this time      5) ETOH use disorder - reports drinks 4 beers & 1 glass of wine every night, was drinking 6 beers/night  -encouraged to decrease ETOH use       RTC 2 weeks after PFT/6MW  Was offered by office staff during visit to schedule PFT & 6MW and she declined.

## 2024-05-29 NOTE — PATIENT INSTRUCTIONS
Ms. Ch,    It was a pleasure to see you in clinic today. We discussed your emphysema, shortness of breath and cough.     Here are my recommendations:    ACUTE UPPER RESPIRATORY INFECTION (BRONCHITIS):  - start prednisone 2 tablets once daily for 5 days, see prescription bottle for full instructions  - start doxycycline 1 tablet twice daily for 10 days, see prescription bottle for full instructions    ALLERGIES:  - stop claritin since not helping  - start cetirizine (zyrtec) 1 tab daily at night     INHALERS:  -Continue Trerlegy inhaler 1 puff once daily (RINSE MOUTH AND SPIT AFTER USE)  -Continue albuterol Inhaler 1-2 puffs every 4-6 hours as needed for shortness of breath.     TESTING:  -Please schedule the breathing tests ordered at your last visit (pulmonary function test & 6-minute walk)  -Lung cancer screening CT chest, ordered to be completed on/after 7/19/24, please schedule this    SMOKING CESSATION:  -please continue to work on decreasing your smoking    ALCOHOL USE:  -please continue to try to cut back on drinking alcohol    Thank you for visiting the Pulmonary clinic today!   Return to clinic  2 weeks after PFT/6MW or sooner if needed   Jackie Gibbons CNP    My office number is [phone] - Mitzy is my  and Elizabeth is my nurse.     [phone] - scheduling    Any test results will be discussed at next visit -- please make sure to make a follow up appt after testing.

## 2024-06-01 DIAGNOSIS — F41.9 ANXIETY: ICD-10-CM

## 2024-06-02 RX ORDER — CITALOPRAM 20 MG/1
20 TABLET, FILM COATED ORAL DAILY
Qty: 90 TABLET | Refills: 3 | Status: SHIPPED | OUTPATIENT
Start: 2024-06-02

## 2024-06-03 DIAGNOSIS — J43.9 PULMONARY EMPHYSEMA, UNSPECIFIED EMPHYSEMA TYPE (MULTI): ICD-10-CM

## 2024-06-03 RX ORDER — FLUTICASONE FUROATE, UMECLIDINIUM BROMIDE AND VILANTEROL TRIFENATATE 200; 62.5; 25 UG/1; UG/1; UG/1
POWDER RESPIRATORY (INHALATION)
Qty: 180 EACH | Refills: 3 | Status: SHIPPED | OUTPATIENT
Start: 2024-06-03

## 2024-06-04 ENCOUNTER — APPOINTMENT (OUTPATIENT)
Dept: PRIMARY CARE | Facility: CLINIC | Age: 74
End: 2024-06-04
Payer: MEDICARE

## 2024-07-08 ENCOUNTER — APPOINTMENT (OUTPATIENT)
Dept: RESPIRATORY THERAPY | Facility: HOSPITAL | Age: 74
End: 2024-07-08
Payer: MEDICARE

## 2024-08-12 ENCOUNTER — OFFICE VISIT (OUTPATIENT)
Dept: PULMONOLOGY | Facility: CLINIC | Age: 74
End: 2024-08-12
Payer: MEDICARE

## 2024-08-12 VITALS
DIASTOLIC BLOOD PRESSURE: 91 MMHG | RESPIRATION RATE: 16 BRPM | SYSTOLIC BLOOD PRESSURE: 165 MMHG | OXYGEN SATURATION: 92 % | TEMPERATURE: 97.8 F | HEART RATE: 79 BPM | BODY MASS INDEX: 22.66 KG/M2 | HEIGHT: 65 IN | WEIGHT: 136 LBS

## 2024-08-12 DIAGNOSIS — Z78.9 ALCOHOL CONSUMPTION OF FOUR TO FIVE DRINKS PER DAY: ICD-10-CM

## 2024-08-12 DIAGNOSIS — J30.9 ALLERGIC RHINITIS, UNSPECIFIED SEASONALITY, UNSPECIFIED TRIGGER: ICD-10-CM

## 2024-08-12 DIAGNOSIS — J43.9 PULMONARY EMPHYSEMA, UNSPECIFIED EMPHYSEMA TYPE (MULTI): Primary | ICD-10-CM

## 2024-08-12 DIAGNOSIS — F17.210 TOBACCO DEPENDENCE DUE TO CIGARETTES: ICD-10-CM

## 2024-08-12 PROCEDURE — 99213 OFFICE O/P EST LOW 20 MIN: CPT | Performed by: NURSE PRACTITIONER

## 2024-08-12 PROCEDURE — 3008F BODY MASS INDEX DOCD: CPT | Performed by: NURSE PRACTITIONER

## 2024-08-12 PROCEDURE — 1159F MED LIST DOCD IN RCRD: CPT | Performed by: NURSE PRACTITIONER

## 2024-08-12 PROCEDURE — 3077F SYST BP >= 140 MM HG: CPT | Performed by: NURSE PRACTITIONER

## 2024-08-12 PROCEDURE — 3080F DIAST BP >= 90 MM HG: CPT | Performed by: NURSE PRACTITIONER

## 2024-08-12 NOTE — PROGRESS NOTES
" Patient: Rosa Ch    [id number]  : 1950 -- AGE 74 y.o.    Provider: FEDE Dotson     Location [facility]   Service Date: 2024       Department of Medicine  Division of Pulmonary, Critical Care, and Sleep Medicine       [facility]  New Visit Note      HISTORY OF PRESENT ILLNESS     The patient's referring provider is: No ref. provider found    Chief complaint:  Rosa Ch is a 74 y.o. female who presents to a [facility] for an evaluation with concerns of COPD (Patient is here for follow up visit. Patient states her breathing is good. Patient denies cough. Patient is a current smoker 1 ppd.  Patient has no new concerns. ).     HISTORY OF PRESENT ILLNESS:  Ms. Ch is a 74 year old  female (current smoker, 1 PPD since age 27, ~46 pack year history) with a PMHx of anxiety, HTN, HLD, alcohol use disorder, COPD (no PFTs on file but emphysema on imaging). Here for follow-up visit for emphysema.     I have independently interviewed and examined the patient in the office and reviewed available records.    DATE OF LAST VISIT: 24    Current History    Since last visit on 24 patient did not complete PFT/6MW or LDCT to follow up on pulmonary nodules. She reports she was getting letters from her insurance that she was \"in the donut hole\" and they wouldn't pay for any testing. Encouraged her to reach out to her insurance to see about getting her testing. She states she is getting a different insurance provider in a few months.     She reports her breathing has been pretty good since she took the antibiotics & steroid at [facility] last visit. She has been using Trelegy 1 puff once daily and feels her albuterol inhaler isn't working correctly so she boough OTC primatene mist (encouaged her not to use primatene  and provided proper inhaler use as she was pressing down and holding the " "albuterol inhaler instead of squeezing it once). She denies wheezing, chest pain/tightness, or cough. She takes zyrtec as needed for allergy symptoms.     Patient not wearing O2 during visit (pulse ox 91%); she reports she paid for a used Inogen that she uses PRN at home, especially when she wakes up if she is tired.       Prior Notes & History     5/29/24:  Current History - On today's visit, the patient reports her breathing is good however noted to have coarse cough and conversational dyspnea. She states the cough started about 4-5 days ago, is productive. Also having increased REDMAN and wheezing for that long. She has been using Trelegy one puff once daily and albuterol about one time/day. She has a portable oxygen concentrator that she uses when she feels like she needs it but does not monitor her oxygen level, stating \"I use it for about 5 minutes in the morning when I first wake up and then use my inhalers\". She denies chest pain/tightness. She is having increased nasal congestion/post-nasal drip that is not improved with claritin she is taking, does not like nasal sprays. She denies fever or chills. Possible known sick contact, feels like a friend had similar symptoms recently.      Patient did not have PFT or 6MW testing completed because she said she has problems walking and doesn't know if she could walk that long. Encouraged her to complete testing so we can see the function of her lungs and also determine if she needs oxygen and how much she should be using if so since no formal testing done to determine that.      Smoking: still smoking, 0.5-0.75 PPD      ETOH use: still drinking daily, 4 beer and glass of wine every day      Denies recent visits to UC, ER, PCP for breathing.    3/6/23:   Current History  Patient is here as a referral from her PCP - Dr. Pantoja     Patient reports that she was told recently she has COPD and needs to see pulmonary; has never had PFTs completed.      Patient denies SOB at " "rest but reports REDMAN with walking, especially up and down stairs in her condo. She denies frequent cough, only has occasional non-productive cough. She has occasional wheezing. She has been on symbicort & albuterol however has been using too frequently d/t not having good relief and she is running out before her insurance will cover more. She reports using Symbicort 2 puffs BID as prescribed but states she received a 90 day supply in 01/2024 and is out already. She uses her albuterol more frequently because she does not feel the medication coming out of the inhaler so she doesn't think she is getting it. She denies orthopnea or BLE edema. Denies chest pain or acid reflux. She has occasional nasal congestion/post nasal drip that she will use benadryl for -- discussed using zyrtec or claritin instead of benadryl d/t side effects. She reports she snores at night, sleeps on 1 pillow, denies apnea or waking up coughing/gasping for air at night, and feels well rested upon waking in the morning.      Patient was seen by PCP 01/2024 and per note desaturated to 84% with 1 minute walk in hallway on RA so was prescribed oxygen. She reports she purchased used portable concentrator and she really only uses is before going up and down her stairs at home to \"give herself enough air\". Will also take with her in car on road trips.      Pulmonary hospitalization history:  -age 29 inhaled micro-glass beads, caused PNA  -2 hospital admissions for pneumonia/respiratory issues, thinks she was on BiPAP/CPAP during one admission (unsure how long ago)     Social:  -smoking: current smoker, 1 PPD since age 27, 01/2024 cut back to 1/2 PPF  -ETOH: daily, 4 beers/day, was at least 6 beers/day prior  -illicit drugs: denies  -occupation: retired; worked as  around autobody filler and sealants  -Exposures: autobody filler, sealants; reports around age 29 inhaled micro-glass beads    REVIEW OF SYSTEMS     REVIEW OF " SYSTEMS  Review of Systems  10-point ROS complete and negative except as documented in HPI    ALLERGIES AND MEDICATIONS     ALLERGIES  Allergies   Allergen Reactions    Peanut Unknown    Penicillins Unknown    Sulfa (Sulfonamide Antibiotics) Unknown       MEDICATIONS  Current Outpatient Medications   Medication Sig Dispense Refill    albuterol 90 mcg/actuation inhaler USE 2 INHALATIONS BY MOUTH EVERY 4 TO 6 HOURS AS NEEDED 17 g 9    amLODIPine (Norvasc) 2.5 mg tablet Take 1 tablet (2.5 mg) by mouth once daily. 100 tablet 2    cetirizine (ZyrTEC) 10 mg tablet Take 1 tablet (10 mg) by mouth once daily. Take in the evening before bedtime 30 tablet 3    citalopram (CeleXA) 20 mg tablet Take 1 tablet (20 mg) by mouth once daily. 90 tablet 3    lisinopril 40 mg tablet Take 1 tablet (40 mg) by mouth once daily. 100 tablet 2    Trelegy Ellipta 200-62.5-25 mcg blister with device INHALE 1 INHALATION BY MOUTH  ONCE DAILY RINSE MOUTH WITH  WATER AFTER USE TO REDUCE  AFTERTASTE AND INCIDENCE OF  CANDIDIASIS. DO NOT SWALLOW 180 each 3     No current facility-administered medications for this visit.       PAST HISTORY     PAST MEDICAL HISTORY  She  has a past medical history of COPD (chronic obstructive pulmonary disease) (Multi) and Hypertension.    PAST SURGICAL HISTORY  Past Surgical History:   Procedure Laterality Date    FACIAL COSMETIC SURGERY      TUBAL LIGATION  03/16/2016    Tubal Ligation       IMMUNIZATION HISTORY  Immunization History   Administered Date(s) Administered    Influenza, Unspecified 02/27/2019    Influenza, seasonal, injectable 02/27/2019    Andreina SARS-CoV-2 Vaccination 08/11/2021, 11/08/2021    Pneumococcal conjugate vaccine, 13-valent (PREVNAR 13) 12/08/2020    Pneumococcal polysaccharide vaccine, 23-valent, age 2 years and older (PNEUMOVAX 23) 11/20/2019       FAMILY HISTORY  Family History   Problem Relation Name Age of Onset    Breast cancer Mother      Other (bladder cancer) Mother      Hepatitis  "Sister      Arthritis Sister         PHYSICAL EXAM     VITAL SIGNS: BP (!) 165/91   Pulse 79   Temp 36.6 °C (97.8 °F)   Resp 16   Ht 1.651 m (5' 5\")   Wt 61.7 kg (136 lb)   SpO2 92%   BMI 22.63 kg/m²      PREVIOUS WEIGHTS:  Wt Readings from Last 3 Encounters:   08/12/24 61.7 kg (136 lb)   05/29/24 62.3 kg (137 lb 6.4 oz)   03/06/24 60.3 kg (133 lb)       Physical Exam  Vitals reviewed.   Constitutional:       General: She is not in acute distress.     Appearance: She is not ill-appearing.   HENT:      Mouth/Throat:      Mouth: Mucous membranes are moist.   Eyes:      General: No scleral icterus.  Cardiovascular:      Rate and Rhythm: Normal rate and regular rhythm.      Pulses: Normal pulses.      Heart sounds: Normal heart sounds.   Pulmonary:      Effort: Pulmonary effort is normal. No respiratory distress.      Breath sounds: No wheezing, rhonchi or rales.      Comments: Diminished throughout   Musculoskeletal:         General: Normal range of motion.      Right lower leg: No edema.      Left lower leg: No edema.   Skin:     General: Skin is warm and dry.   Neurological:      Mental Status: She is alert and oriented to person, place, and time.         RESULTS/DATA     Pulmonary Function Test Results     No PFT on record    Chest Radiograph     No results found for this or any previous visit from the past 365 days.      Chest CT Scan     CT lung screening low dose 7/19/23  FINDINGS:  LUNGS AND AIRWAYS:  The trachea and central airways are patent. No endobronchial lesion  is seen. Small amount of mucus and secretion trachea.     There is moderate to severe bilateral upper lung predominant  centrilobular and paraseptal emphysema.There is no focal  consolidation, pleural effusion, or pneumothorax. There is biapical  pleuroparenchymal scarring.     4 mm right upper lobe nodule, image 69/319, stable.  3 mm right lower lobe nodule, image 220, stable.  3 mm pleural-based nodular density in the left upper lobe, " image  64/319, stable.  3 mm nodule air density in the lingula, image 183, stable.     MEDIASTINUM AND EVAN, LOWER NECK AND AXILLA:  The visualized thyroid gland is within normal limits.  No evidence of thoracic lymphadenopathy by CT criteria.  Esophagus appears within normal limits as seen.     HEART AND VESSELS:  The thoracic aorta normal in course and caliber.There are mild to  moderate scattered calcified atherosclerosis present.  Main pulmonary artery and its branches are normal in caliber.  No coronary artery calcifications are seen. Please note, the study is  not optimized for evaluation of coronary arteries.  The cardiac chambers are not enlarged.  There is no pericardial effusion seen.     UPPER ABDOMEN:  The visualized subdiaphragmatic structures demonstrate no remarkable  findings.           CHEST WALL AND OSSEOUS STRUCTURES:  Chest wall is within normal limits.  No acute osseous pathology.There are no suspicious osseous lesions.      Impression   1.  Few small bilateral noncalcified pulmonary nodules measuring up  to 4 mm, likely benign. Continued screening with low-dose noncontrast  chest CT in 12 months (from current date) is recommended.  2. Moderate to severe upper lung predominant emphysema. Biapical  pleural parenchyma scarring, unchanged.        LUNG RADS CATEGORY:  Lung-RADS 2 (Benign Appearance or Indolent behavior): Continue with  annual screening in 12 months,  CT Chest Lung Ca Screen Initial  or Follow up LR1/LR2/Continuation of Screening Low Dose recommended  as per American College of Radiology Guidelines Lung-RADS Version  2022.     CT lung screening low dose 2/24/22  FINDINGS:  LUNGS AND AIRWAYS:  The trachea and central airways are patent. No endobronchial lesion  is seen.     Redemonstration of moderate to severe upper lobe predominant  centrilobular and paraseptal emphysema. Stable biapical  pleuroparenchymal scarring. No other focal airspace consolidation,  pleural effusion or  pneumothorax.     Few small scattered solid noncalcified lung nodules are stable, such  as a 4-mm nodule in the right upper lobe (axial image 73 of 304), two  2-mm nodules in the left upper lobe (axial images 61 and 190 of 304)  and a 3-mm right perifissural nodule in the superior segment right  lower lobe (axial image 101 of 304), which is favored to represent a  benign intrapulmonary lymph nodes. Punctate calcified in the left  lung are stable. No other new worrisome discrete lung nodules or  masses.     MEDIASTINUM AND EVAN, LOWER NECK AND AXILLA:  The visualized thyroid gland is within normal limits.  No evidence of thoracic lymphadenopathy by CT criteria.  Esophagus appears within normal limits as seen.     HEART AND VESSELS:  The thoracic aorta normal in course and caliber.There is mild  scattered calcified atherosclerosis present.  Main pulmonary artery and its branches are normal in caliber.  No coronary artery calcifications are seen. Please note, the study is  not optimized for evaluation of coronary arteries.  The cardiac chambers are not enlarged.  There is no pericardial effusion seen.     UPPER ABDOMEN:  The visualized subdiaphragmatic structures demonstrate no remarkable  findings.     CHEST WALL AND OSSEOUS STRUCTURES:  Chest wall is within normal limits.  No acute osseous pathology.There are no suspicious osseous lesions.     IMPRESSION:  1. Stable few small solid noncalcified lung nodules measuring up to 4  mm, likely benign. Continued annual screening with low-dose  noncontrast chest CT is recommended.  2. Moderate to severe emphysema.  3. Other findings as above.        LUNG RADS CATEGORY:  Lung-RADS 2,  (Benign Appearance or Behavior): Continue with annual  screening in 12 months,  CT Chest Lung Ca Screen Initial or  Follow up LR1/LR2/Continuation of Screening Low Dose recommended as  per American College of Radiology Guidelines Lung-RADS Version 1.1    Echocardiogram & Cardiac Studies      No results found for this or any previous visit from the past 365 days.       Labwork & Pathology   Complete Blood Count  Lab Results   Component Value Date    WBC 13.1 (H) 01/07/2024    HGB 16.0 01/07/2024    HCT 46.5 (H) 01/07/2024    MCV 96 01/07/2024     01/07/2024       Peripheral Eosinophil Count:   Eosinophils Absolute   Date Value   01/07/2024 0.07 x10*3/uL   01/11/2023 0.02 x10E9/L   05/19/2020 0.12 x10E9/L       ASSESSMENT/PLAN     Ms. Ch is a 74 year old  female (current smoker, 1 PPD since age 27, ~46 pack year history) with a PMHx of anxiety, HTN, HLD, alcohol use disorder, COPD (no PFTs on file but emphysema on imaging). Here for follow-up visit for emphysema.     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29; REDMAN with walking up and down stairs; on O2 PRN per PCP; improved since changed to Trelegy from Symbicort 03/2024  -did not complete PFT/6MW after previous visit, encouraged her to schedule and complete  -Continue Trelegy 1 puff daily & PRN albuterol     2) O2 use - per PCP note 01/2024, did 1 minute walk in office and patient destaurated ot 84% so ordered O2; patient purchased used concentrator; 95% on RA last visit, 96% on RA today; patient only randomly using at home via Inogen that she purchased herself when she feels she might need it and not checking O2   -ordered 6MW to determine O2 need at last visit but  she did not schedule/complete, educated importance of testing to determine if she needs O2 and how much she should be using if so, she verbalized understanding and said she would schedule      4) Lung cancer screening - LDCT done 7/19/23 with multiple stable 3-4mm nodules  -advised LDCT ordered at prior visit but not completed, advised can completed at any time as it has been 12 months since prior; encouraged her to schedule and complete      5) Tobacco dependence d/t cigarettes - current smoker, 1 PPD since age 27, 01/2024 cut back to 0.5-0.75 PPD; ~46 pack year history  -# Smoking cessation: Patient is currently smoking -- >5 minutes smoking cessation counseling   - offered pharmacologic options to assist with quitting, not interested; has tried in the past, allergic to adhesive in nicotine patches; not appropriate for Chantix d/t history of anxiety  -not interested in assistance with quitting at this time      5) ETOH use disorder - reports drinks 4 beers & 1 glass of wine every night, was drinking 6 beers/night -- 8/12/24 today reports is drinking 2 beers while making dinner and 2-3 glasses of wine at night; feels she is drinking less because she started eating chocolate cake   -encouraged to decrease ETOH use       RTC 6 months

## 2024-08-12 NOTE — PATIENT INSTRUCTIONS
Ms. Ch,    It was a pleasure to see you in clinic today. We discussed your emphysema, shortness of breath and cough.     Here are my recommendations:  - continue cetirizine (zyrtec) 1 tab daily at night   -Continue Trerlegy inhaler 1 puff once daily (RINSE MOUTH AND SPIT AFTER USE)  -Continue albuterol Inhaler 1-2 puffs every 4-6 hours as needed for shortness of breath.     TESTING:  -Please schedule the breathing tests ordered at your last visit (pulmonary function test & 6-minute walk)  -Lung cancer screening CT chest, ordered to be completed on/after 7/19/24, please schedule this    SMOKING CESSATION:  -please continue to work on decreasing your smoking    ALCOHOL USE:  -please continue to try to cut back on drinking alcohol    Thank you for visiting the Pulmonary clinic today!   Return to clinic in 6 months or sooner if needed   Jackie Gibbons CNP    My office number is [phone] - Mitzy is my  and Elizabeth is my nurse.     [phone] - scheduling    Any test results will be discussed at next visit -- please make sure to make a follow up appt after testing.

## 2024-09-05 ENCOUNTER — APPOINTMENT (OUTPATIENT)
Dept: RADIOLOGY | Facility: CLINIC | Age: 74
End: 2024-09-05
Payer: MEDICARE

## 2024-09-30 ENCOUNTER — APPOINTMENT (OUTPATIENT)
Dept: PRIMARY CARE | Facility: CLINIC | Age: 74
End: 2024-09-30
Payer: MEDICARE

## 2024-09-30 VITALS
TEMPERATURE: 97.8 F | DIASTOLIC BLOOD PRESSURE: 65 MMHG | HEART RATE: 85 BPM | BODY MASS INDEX: 21.35 KG/M2 | WEIGHT: 136 LBS | SYSTOLIC BLOOD PRESSURE: 113 MMHG | OXYGEN SATURATION: 93 % | HEIGHT: 67 IN

## 2024-09-30 DIAGNOSIS — Z00.00 MEDICARE ANNUAL WELLNESS VISIT, SUBSEQUENT: Primary | ICD-10-CM

## 2024-09-30 DIAGNOSIS — Z00.00 PHYSICAL EXAM: ICD-10-CM

## 2024-09-30 DIAGNOSIS — J43.9 PULMONARY EMPHYSEMA, UNSPECIFIED EMPHYSEMA TYPE (MULTI): ICD-10-CM

## 2024-09-30 DIAGNOSIS — I10 PRIMARY HYPERTENSION: ICD-10-CM

## 2024-09-30 PROCEDURE — 3008F BODY MASS INDEX DOCD: CPT | Performed by: INTERNAL MEDICINE

## 2024-09-30 PROCEDURE — 1123F ACP DISCUSS/DSCN MKR DOCD: CPT | Performed by: INTERNAL MEDICINE

## 2024-09-30 PROCEDURE — 99397 PER PM REEVAL EST PAT 65+ YR: CPT | Performed by: INTERNAL MEDICINE

## 2024-09-30 PROCEDURE — 3078F DIAST BP <80 MM HG: CPT | Performed by: INTERNAL MEDICINE

## 2024-09-30 PROCEDURE — 1159F MED LIST DOCD IN RCRD: CPT | Performed by: INTERNAL MEDICINE

## 2024-09-30 PROCEDURE — 1158F ADVNC CARE PLAN TLK DOCD: CPT | Performed by: INTERNAL MEDICINE

## 2024-09-30 PROCEDURE — G0439 PPPS, SUBSEQ VISIT: HCPCS | Performed by: INTERNAL MEDICINE

## 2024-09-30 PROCEDURE — 3074F SYST BP LT 130 MM HG: CPT | Performed by: INTERNAL MEDICINE

## 2024-09-30 PROCEDURE — 1170F FXNL STATUS ASSESSED: CPT | Performed by: INTERNAL MEDICINE

## 2024-09-30 RX ORDER — FLUTICASONE FUROATE, UMECLIDINIUM BROMIDE AND VILANTEROL TRIFENATATE 200; 62.5; 25 UG/1; UG/1; UG/1
1 POWDER RESPIRATORY (INHALATION) DAILY
Qty: 180 EACH | Refills: 3 | Status: SHIPPED | OUTPATIENT
Start: 2024-09-30

## 2024-09-30 RX ORDER — FLUTICASONE FUROATE AND VILANTEROL 200; 25 UG/1; UG/1
1 POWDER RESPIRATORY (INHALATION)
COMMUNITY
Start: 2024-01-07 | End: 2024-09-30 | Stop reason: ALTCHOICE

## 2024-09-30 RX ORDER — ENOXAPARIN SODIUM 100 MG/ML
40 INJECTION SUBCUTANEOUS
COMMUNITY
Start: 2024-01-07

## 2024-09-30 RX ORDER — EPINEPHRINE INHALATION 125 UG/1
AEROSOL RESPIRATORY (INHALATION)
COMMUNITY

## 2024-09-30 ASSESSMENT — PATIENT HEALTH QUESTIONNAIRE - PHQ9
3. TROUBLE FALLING OR STAYING ASLEEP OR SLEEPING TOO MUCH: NOT AT ALL
4. FEELING TIRED OR HAVING LITTLE ENERGY: SEVERAL DAYS
SUM OF ALL RESPONSES TO PHQ9 QUESTIONS 1 AND 2: 1
6. FEELING BAD ABOUT YOURSELF - OR THAT YOU ARE A FAILURE OR HAVE LET YOURSELF OR YOUR FAMILY DOWN: SEVERAL DAYS
SUM OF ALL RESPONSES TO PHQ QUESTIONS 1-9: 3
1. LITTLE INTEREST OR PLEASURE IN DOING THINGS: SEVERAL DAYS
7. TROUBLE CONCENTRATING ON THINGS, SUCH AS READING THE NEWSPAPER OR WATCHING TELEVISION: NOT AT ALL
9. THOUGHTS THAT YOU WOULD BE BETTER OFF DEAD, OR OF HURTING YOURSELF: NOT AT ALL
5. POOR APPETITE OR OVEREATING: NOT AT ALL
8. MOVING OR SPEAKING SO SLOWLY THAT OTHER PEOPLE COULD HAVE NOTICED. OR THE OPPOSITE, BEING SO FIGETY OR RESTLESS THAT YOU HAVE BEEN MOVING AROUND A LOT MORE THAN USUAL: NOT AT ALL
2. FEELING DOWN, DEPRESSED OR HOPELESS: NOT AT ALL

## 2024-09-30 ASSESSMENT — ACTIVITIES OF DAILY LIVING (ADL)
MANAGING_FINANCES: INDEPENDENT
GROCERY_SHOPPING: INDEPENDENT
BATHING: INDEPENDENT
TAKING_MEDICATION: INDEPENDENT
DRESSING: INDEPENDENT
DOING_HOUSEWORK: NEEDS ASSISTANCE

## 2024-09-30 ASSESSMENT — ENCOUNTER SYMPTOMS
OCCASIONAL FEELINGS OF UNSTEADINESS: 0
LOSS OF SENSATION IN FEET: 0

## 2024-09-30 NOTE — PROGRESS NOTES
"Krista Pantoja MD  SUBJECTIVE:     Rosa Ch is a 74 y.o. female presenting for her annual physical/wellness.    No new concerns.  Has been seeing pulmonologist NP for COPD. She was told they did not need to continue to see her any more, and could just jasiel refills from me in future.  She uses O2 at home only, as needed.  Colon screening: declined  Last pap: na  Last mammogram: declined  Dexa declined  Vaccines declined all  Diet:  healthy in general  Exercises: somewhat regularly  No results found for: \"HGBA1C\"  Lab Results   Component Value Date    CREATININE 0.41 (L) 01/07/2024     Lab Results   Component Value Date    WBC 13.1 (H) 01/07/2024    HGB 16.0 01/07/2024    HCT 46.5 (H) 01/07/2024    MCV 96 01/07/2024     01/07/2024     Lab Results   Component Value Date    CHOL 258 (H) 06/08/2022    CHOL 252 (H) 06/08/2021    CHOL 236 (H) 05/19/2020     Lab Results   Component Value Date    HDL 62.3 06/08/2022    HDL 60.8 06/08/2021    HDL 65.9 05/19/2020     No results found for: \"LDLCALC\"  Lab Results   Component Value Date    TRIG 147 05/19/2020     No components found for: \"CHOLHDL\"       ROS:   otherwise Feeling well. No dyspnea or chest pain on exertion. No abdominal pain, change in bowel habits, black or bloody stools. No urinary tract or  symptoms.  No breast concerns. No neurological complaints.    OBJECTIVE:   The patient appears well, alert, oriented x 3, in no distress.   /65   Pulse 85   Temp 36.6 °C (97.8 °F)   Ht 1.689 m (5' 6.5\")   Wt 61.7 kg (136 lb)   SpO2 93%   BMI 21.62 kg/m²   ENT normal.  Neck supple. No adenopathy or thyromegaly. REJI. Lungs are clear, decreased air entry, no wheezes, rhonchi or rales. S1 and S2 normal, no murmurs, regular rate and rhythm. Abdomen is soft without tenderness, guarding, mass or organomegaly.  exam deferred to Gyn. Extremities show no edema, normal peripheral pulses. Neurological is normal without focal findings.    Assessment & " Plan  Medicare annual wellness visit, subsequent         Physical exam         Pulmonary emphysema, unspecified emphysema type (Multi)    Orders:    fluticasone-umeclidin-vilanter (Trelegy Ellipta) 200-62.5-25 mcg blister with device; Inhale 1 puff once daily.    Primary hypertension         Acute respiratory failure with hypoxia (Multi)

## 2024-09-30 NOTE — ASSESSMENT & PLAN NOTE
Orders:    fluticasone-umeclidin-vilanter (Trelegy Ellipta) 200-62.5-25 mcg blister with device; Inhale 1 puff once daily.

## 2024-10-08 ENCOUNTER — HOSPITAL ENCOUNTER (OUTPATIENT)
Dept: RADIOLOGY | Facility: CLINIC | Age: 74
End: 2024-10-08
Payer: MEDICARE

## 2024-10-31 ENCOUNTER — TELEPHONE (OUTPATIENT)
Dept: PULMONOLOGY | Facility: HOSPITAL | Age: 74
End: 2024-10-31
Payer: MEDICARE

## 2024-11-01 ENCOUNTER — TELEPHONE (OUTPATIENT)
Dept: PRIMARY CARE | Facility: CLINIC | Age: 74
End: 2024-11-01
Payer: MEDICARE

## 2024-11-01 NOTE — TELEPHONE ENCOUNTER
PT STATE THAT AT FIRST THE TRILOGY WAS NOT BOTHERING BUT NOW IT IS JOINT AND BACK PAIN AND RETAINING URINE. SHE CALLED CARDIOLOGY AND THEY TOLD HER THAT IT WAS NOT TRILOGY WAS NOT THE CAUSE AND THAT SHE NEED TO TALK TO HER PCP SHE ALSO THAT SHE FEEL LIKE SHE HAVE THE FLU. SHE IS CONVINCED THAT IS IS THE MED BECAUSE SHE DID NOT TAKE IT TODAY AND SHE FEEL FINE

## 2024-11-01 NOTE — TELEPHONE ENCOUNTER
She should let her pulmonologist know that she has stopped the medication and what else they would recommend to replace it.

## 2025-01-02 DIAGNOSIS — F41.9 ANXIETY: ICD-10-CM

## 2025-01-02 DIAGNOSIS — J43.9 PULMONARY EMPHYSEMA, UNSPECIFIED EMPHYSEMA TYPE (MULTI): ICD-10-CM

## 2025-01-02 DIAGNOSIS — I10 PRIMARY HYPERTENSION: ICD-10-CM

## 2025-01-02 RX ORDER — LISINOPRIL 40 MG/1
40 TABLET ORAL DAILY
Qty: 100 TABLET | Refills: 2 | Status: SHIPPED | OUTPATIENT
Start: 2025-01-02

## 2025-01-02 RX ORDER — FLUTICASONE FUROATE, UMECLIDINIUM BROMIDE AND VILANTEROL TRIFENATATE 200; 62.5; 25 UG/1; UG/1; UG/1
1 POWDER RESPIRATORY (INHALATION) DAILY
Qty: 180 EACH | Refills: 3 | Status: SHIPPED | OUTPATIENT
Start: 2025-01-02

## 2025-01-02 RX ORDER — CITALOPRAM 20 MG/1
20 TABLET, FILM COATED ORAL DAILY
Qty: 90 TABLET | Refills: 3 | Status: SHIPPED | OUTPATIENT
Start: 2025-01-02

## 2025-01-02 RX ORDER — AMLODIPINE BESYLATE 2.5 MG/1
2.5 TABLET ORAL DAILY
Qty: 100 TABLET | Refills: 2 | Status: SHIPPED | OUTPATIENT
Start: 2025-01-02

## 2025-01-08 ENCOUNTER — TELEPHONE (OUTPATIENT)
Dept: PRIMARY CARE | Facility: CLINIC | Age: 75
End: 2025-01-08
Payer: MEDICARE

## 2025-01-08 DIAGNOSIS — J43.9 PULMONARY EMPHYSEMA, UNSPECIFIED EMPHYSEMA TYPE (MULTI): ICD-10-CM

## 2025-01-08 RX ORDER — FLUTICASONE FUROATE, UMECLIDINIUM BROMIDE AND VILANTEROL TRIFENATATE 200; 62.5; 25 UG/1; UG/1; UG/1
1 POWDER RESPIRATORY (INHALATION) DAILY
Qty: 180 EACH | Refills: 3 | Status: SHIPPED | OUTPATIENT
Start: 2025-01-08

## 2025-01-08 NOTE — TELEPHONE ENCOUNTER
PT STATES THAT THE MAIL ORDER LOST HER MED SO SHE WOULD LIKE TO KNOW IF YOU COULD SEND IT TO [facility] INSTEAD

## 2025-02-12 ENCOUNTER — APPOINTMENT (OUTPATIENT)
Dept: PULMONOLOGY | Facility: CLINIC | Age: 75
End: 2025-02-12
Payer: MEDICARE

## 2025-04-17 DIAGNOSIS — J43.9 PULMONARY EMPHYSEMA, UNSPECIFIED EMPHYSEMA TYPE (MULTI): ICD-10-CM

## 2025-04-17 RX ORDER — FLUTICASONE FUROATE, UMECLIDINIUM BROMIDE AND VILANTEROL TRIFENATATE 200; 62.5; 25 UG/1; UG/1; UG/1
1 POWDER RESPIRATORY (INHALATION) DAILY
Qty: 180 EACH | Refills: 3 | Status: SHIPPED | OUTPATIENT
Start: 2025-04-17

## 2025-07-21 ENCOUNTER — APPOINTMENT (OUTPATIENT)
Dept: CARDIOLOGY | Facility: HOSPITAL | Age: 75
End: 2025-07-21
Payer: MEDICARE

## 2025-07-21 ENCOUNTER — APPOINTMENT (OUTPATIENT)
Dept: RADIOLOGY | Facility: HOSPITAL | Age: 75
End: 2025-07-21
Payer: MEDICARE

## 2025-07-21 ENCOUNTER — HOSPITAL ENCOUNTER (INPATIENT)
Facility: HOSPITAL | Age: 75
LOS: 3 days | Discharge: HOME | End: 2025-07-24
Attending: STUDENT IN AN ORGANIZED HEALTH CARE EDUCATION/TRAINING PROGRAM | Admitting: INTERNAL MEDICINE
Payer: MEDICARE

## 2025-07-21 DIAGNOSIS — J96.01 ACUTE RESPIRATORY FAILURE WITH HYPOXIA: ICD-10-CM

## 2025-07-21 DIAGNOSIS — R55 SYNCOPE AND COLLAPSE: ICD-10-CM

## 2025-07-21 DIAGNOSIS — R09.02 HYPOXIA: ICD-10-CM

## 2025-07-21 DIAGNOSIS — J43.9 PULMONARY EMPHYSEMA, UNSPECIFIED EMPHYSEMA TYPE (MULTI): ICD-10-CM

## 2025-07-21 DIAGNOSIS — J44.1 COPD EXACERBATION (MULTI): Primary | ICD-10-CM

## 2025-07-21 DIAGNOSIS — J44.1 CHRONIC OBSTRUCTIVE PULMONARY DISEASE WITH ACUTE EXACERBATION (MULTI): ICD-10-CM

## 2025-07-21 DIAGNOSIS — Z78.9 ALCOHOL CONSUMPTION OF FOUR TO FIVE DRINKS PER DAY: ICD-10-CM

## 2025-07-21 DIAGNOSIS — I10 PRIMARY HYPERTENSION: ICD-10-CM

## 2025-07-21 LAB
ALBUMIN SERPL BCP-MCNC: 4.7 G/DL (ref 3.4–5)
ALP SERPL-CCNC: 71 U/L (ref 33–136)
ALT SERPL W P-5'-P-CCNC: 20 U/L (ref 7–45)
ANION GAP BLDV CALCULATED.4IONS-SCNC: 7 MMOL/L (ref 10–25)
ANION GAP SERPL CALC-SCNC: 13 MMOL/L (ref 10–20)
AORTIC VALVE MEAN GRADIENT: 3 MMHG
AORTIC VALVE PEAK VELOCITY: 1.31 M/S
AST SERPL W P-5'-P-CCNC: 22 U/L (ref 9–39)
ATRIAL RATE: 91 BPM
ATRIAL RATE: 97 BPM
AV PEAK GRADIENT: 7 MMHG
AVA (PEAK VEL): 1.88 CM2
AVA (VTI): 2.06 CM2
BASE EXCESS BLDV CALC-SCNC: 6.9 MMOL/L (ref -2–3)
BASOPHILS # BLD AUTO: 0.06 X10*3/UL (ref 0–0.1)
BASOPHILS NFR BLD AUTO: 0.7 %
BILIRUB SERPL-MCNC: 1.4 MG/DL (ref 0–1.2)
BNP SERPL-MCNC: 46 PG/ML (ref 0–99)
BODY TEMPERATURE: 37 DEGREES CELSIUS
BUN SERPL-MCNC: 6 MG/DL (ref 6–23)
CA-I BLDV-SCNC: 1.09 MMOL/L (ref 1.1–1.33)
CALCIUM SERPL-MCNC: 9.2 MG/DL (ref 8.6–10.3)
CARDIAC TROPONIN I PNL SERPL HS: 4 NG/L (ref 0–13)
CARDIAC TROPONIN I PNL SERPL HS: 5 NG/L (ref 0–13)
CHLORIDE BLDV-SCNC: 95 MMOL/L (ref 98–107)
CHLORIDE SERPL-SCNC: 96 MMOL/L (ref 98–107)
CO2 SERPL-SCNC: 33 MMOL/L (ref 21–32)
CREAT SERPL-MCNC: 0.34 MG/DL (ref 0.5–1.05)
EGFRCR SERPLBLD CKD-EPI 2021: >90 ML/MIN/1.73M*2
EJECTION FRACTION APICAL 4 CHAMBER: 65.6
EJECTION FRACTION: 68 %
EOSINOPHIL # BLD AUTO: 0.1 X10*3/UL (ref 0–0.4)
EOSINOPHIL NFR BLD AUTO: 1.2 %
ERYTHROCYTE [DISTWIDTH] IN BLOOD BY AUTOMATED COUNT: 13.2 % (ref 11.5–14.5)
GLUCOSE BLDV-MCNC: 93 MG/DL (ref 74–99)
GLUCOSE SERPL-MCNC: 89 MG/DL (ref 74–99)
HCO3 BLDV-SCNC: 36.2 MMOL/L (ref 22–26)
HCT VFR BLD AUTO: 61.5 % (ref 36–46)
HCT VFR BLD EST: 65 % (ref 36–46)
HGB BLD-MCNC: 21 G/DL (ref 12–16)
HGB BLDV-MCNC: 21.7 G/DL (ref 12–16)
IMM GRANULOCYTES # BLD AUTO: 0.02 X10*3/UL (ref 0–0.5)
IMM GRANULOCYTES NFR BLD AUTO: 0.2 % (ref 0–0.9)
INHALED O2 CONCENTRATION: 29 %
LACTATE BLDV-SCNC: 1.1 MMOL/L (ref 0.4–2)
LEFT ATRIUM VOLUME AREA LENGTH INDEX BSA: 18.2 ML/M2
LEFT VENTRICLE INTERNAL DIMENSION DIASTOLE: 4.09 CM (ref 3.5–6)
LEFT VENTRICULAR OUTFLOW TRACT DIAMETER: 2 CM
LV EJECTION FRACTION BIPLANE: 68 %
LYMPHOCYTES # BLD AUTO: 1.21 X10*3/UL (ref 0.8–3)
LYMPHOCYTES NFR BLD AUTO: 14.5 %
MAGNESIUM SERPL-MCNC: 1.96 MG/DL (ref 1.6–2.4)
MCH RBC QN AUTO: 33.7 PG (ref 26–34)
MCHC RBC AUTO-ENTMCNC: 34.1 G/DL (ref 32–36)
MCV RBC AUTO: 99 FL (ref 80–100)
MITRAL VALVE E/A RATIO: 0.86
MONOCYTES # BLD AUTO: 0.67 X10*3/UL (ref 0.05–0.8)
MONOCYTES NFR BLD AUTO: 8 %
NEUTROPHILS # BLD AUTO: 6.27 X10*3/UL (ref 1.6–5.5)
NEUTROPHILS NFR BLD AUTO: 75.4 %
NRBC BLD-RTO: 0 /100 WBCS (ref 0–0)
OXYHGB MFR BLDV: 58.3 % (ref 45–75)
P AXIS: 71 DEGREES
P AXIS: 74 DEGREES
P OFFSET: 197 MS
P ONSET: 149 MS
PCO2 BLDV: 64 MM HG (ref 41–51)
PH BLDV: 7.36 PH (ref 7.33–7.43)
PLATELET # BLD AUTO: 213 X10*3/UL (ref 150–450)
PO2 BLDV: 35 MM HG (ref 35–45)
POTASSIUM BLDV-SCNC: 4.1 MMOL/L (ref 3.5–5.3)
POTASSIUM SERPL-SCNC: 4.1 MMOL/L (ref 3.5–5.3)
PR INTERVAL: 148 MS
PR INTERVAL: 167 MS
PROT SERPL-MCNC: 8.1 G/DL (ref 6.4–8.2)
Q ONSET: 223 MS
Q ONSET: 249 MS
QRS COUNT: 15 BEATS
QRS COUNT: 16 BEATS
QRS DURATION: 108 MS
QRS DURATION: 70 MS
QT INTERVAL: 364 MS
QT INTERVAL: 376 MS
QTC CALCULATION(BAZETT): 462 MS
QTC CALCULATION(BAZETT): 463 MS
QTC FREDERICIA: 427 MS
QTC FREDERICIA: 432 MS
R AXIS: 76 DEGREES
R AXIS: 87 DEGREES
RBC # BLD AUTO: 6.23 X10*6/UL (ref 4–5.2)
RIGHT VENTRICLE FREE WALL PEAK S': 14.5 CM/S
RIGHT VENTRICLE PEAK SYSTOLIC PRESSURE: 40 MMHG
SAO2 % BLDV: 64 % (ref 45–75)
SODIUM BLDV-SCNC: 134 MMOL/L (ref 136–145)
SODIUM SERPL-SCNC: 138 MMOL/L (ref 136–145)
T AXIS: 70 DEGREES
T AXIS: 72 DEGREES
T OFFSET: 405 MS
T OFFSET: 437 MS
TRICUSPID ANNULAR PLANE SYSTOLIC EXCURSION: 2.9 CM
VENTRICULAR RATE: 91 BPM
VENTRICULAR RATE: 97 BPM
WBC # BLD AUTO: 8.3 X10*3/UL (ref 4.4–11.3)

## 2025-07-21 PROCEDURE — 36415 COLL VENOUS BLD VENIPUNCTURE: CPT | Performed by: STUDENT IN AN ORGANIZED HEALTH CARE EDUCATION/TRAINING PROGRAM

## 2025-07-21 PROCEDURE — 2500000002 HC RX 250 W HCPCS SELF ADMINISTERED DRUGS (ALT 637 FOR MEDICARE OP, ALT 636 FOR OP/ED): Performed by: NURSE PRACTITIONER

## 2025-07-21 PROCEDURE — 84132 ASSAY OF SERUM POTASSIUM: CPT | Performed by: STUDENT IN AN ORGANIZED HEALTH CARE EDUCATION/TRAINING PROGRAM

## 2025-07-21 PROCEDURE — 71046 X-RAY EXAM CHEST 2 VIEWS: CPT | Performed by: RADIOLOGY

## 2025-07-21 PROCEDURE — 99222 1ST HOSP IP/OBS MODERATE 55: CPT | Performed by: NURSE PRACTITIONER

## 2025-07-21 PROCEDURE — 84075 ASSAY ALKALINE PHOSPHATASE: CPT | Performed by: STUDENT IN AN ORGANIZED HEALTH CARE EDUCATION/TRAINING PROGRAM

## 2025-07-21 PROCEDURE — 2500000005 HC RX 250 GENERAL PHARMACY W/O HCPCS: Performed by: NURSE PRACTITIONER

## 2025-07-21 PROCEDURE — 97165 OT EVAL LOW COMPLEX 30 MIN: CPT | Mod: GO | Performed by: OCCUPATIONAL THERAPIST

## 2025-07-21 PROCEDURE — 2500000004 HC RX 250 GENERAL PHARMACY W/ HCPCS (ALT 636 FOR OP/ED): Performed by: NURSE PRACTITIONER

## 2025-07-21 PROCEDURE — 83880 ASSAY OF NATRIURETIC PEPTIDE: CPT | Performed by: STUDENT IN AN ORGANIZED HEALTH CARE EDUCATION/TRAINING PROGRAM

## 2025-07-21 PROCEDURE — 2500000001 HC RX 250 WO HCPCS SELF ADMINISTERED DRUGS (ALT 637 FOR MEDICARE OP): Performed by: NURSE PRACTITIONER

## 2025-07-21 PROCEDURE — 94640 AIRWAY INHALATION TREATMENT: CPT

## 2025-07-21 PROCEDURE — 2500000004 HC RX 250 GENERAL PHARMACY W/ HCPCS (ALT 636 FOR OP/ED): Performed by: STUDENT IN AN ORGANIZED HEALTH CARE EDUCATION/TRAINING PROGRAM

## 2025-07-21 PROCEDURE — 99285 EMERGENCY DEPT VISIT HI MDM: CPT | Mod: 25 | Performed by: STUDENT IN AN ORGANIZED HEALTH CARE EDUCATION/TRAINING PROGRAM

## 2025-07-21 PROCEDURE — 73080 X-RAY EXAM OF ELBOW: CPT | Mod: LT

## 2025-07-21 PROCEDURE — 2500000002 HC RX 250 W HCPCS SELF ADMINISTERED DRUGS (ALT 637 FOR MEDICARE OP, ALT 636 FOR OP/ED): Performed by: STUDENT IN AN ORGANIZED HEALTH CARE EDUCATION/TRAINING PROGRAM

## 2025-07-21 PROCEDURE — 96361 HYDRATE IV INFUSION ADD-ON: CPT

## 2025-07-21 PROCEDURE — 84484 ASSAY OF TROPONIN QUANT: CPT | Performed by: STUDENT IN AN ORGANIZED HEALTH CARE EDUCATION/TRAINING PROGRAM

## 2025-07-21 PROCEDURE — 93306 TTE W/DOPPLER COMPLETE: CPT

## 2025-07-21 PROCEDURE — 73080 X-RAY EXAM OF ELBOW: CPT | Mod: LEFT SIDE | Performed by: RADIOLOGY

## 2025-07-21 PROCEDURE — 1200000002 HC GENERAL ROOM WITH TELEMETRY DAILY

## 2025-07-21 PROCEDURE — 96360 HYDRATION IV INFUSION INIT: CPT

## 2025-07-21 PROCEDURE — 85025 COMPLETE CBC W/AUTO DIFF WBC: CPT | Performed by: STUDENT IN AN ORGANIZED HEALTH CARE EDUCATION/TRAINING PROGRAM

## 2025-07-21 PROCEDURE — 93005 ELECTROCARDIOGRAM TRACING: CPT

## 2025-07-21 PROCEDURE — 93306 TTE W/DOPPLER COMPLETE: CPT | Performed by: INTERNAL MEDICINE

## 2025-07-21 PROCEDURE — 71046 X-RAY EXAM CHEST 2 VIEWS: CPT

## 2025-07-21 PROCEDURE — 83735 ASSAY OF MAGNESIUM: CPT | Performed by: NURSE PRACTITIONER

## 2025-07-21 RX ORDER — BISACODYL 5 MG
10 TABLET, DELAYED RELEASE (ENTERIC COATED) ORAL DAILY PRN
Status: DISCONTINUED | OUTPATIENT
Start: 2025-07-21 | End: 2025-07-24 | Stop reason: HOSPADM

## 2025-07-21 RX ORDER — IPRATROPIUM BROMIDE AND ALBUTEROL SULFATE 2.5; .5 MG/3ML; MG/3ML
3 SOLUTION RESPIRATORY (INHALATION) EVERY 20 MIN
Status: DISPENSED | OUTPATIENT
Start: 2025-07-21 | End: 2025-07-21

## 2025-07-21 RX ORDER — LISINOPRIL 10 MG/1
40 TABLET ORAL DAILY
Status: DISCONTINUED | OUTPATIENT
Start: 2025-07-21 | End: 2025-07-24 | Stop reason: HOSPADM

## 2025-07-21 RX ORDER — TALC
3 POWDER (GRAM) TOPICAL NIGHTLY PRN
Status: DISCONTINUED | OUTPATIENT
Start: 2025-07-21 | End: 2025-07-24 | Stop reason: HOSPADM

## 2025-07-21 RX ORDER — LANOLIN ALCOHOL/MO/W.PET/CERES
100 CREAM (GRAM) TOPICAL DAILY
Status: DISCONTINUED | OUTPATIENT
Start: 2025-07-24 | End: 2025-07-24 | Stop reason: HOSPADM

## 2025-07-21 RX ORDER — ENOXAPARIN SODIUM 100 MG/ML
40 INJECTION SUBCUTANEOUS EVERY 24 HOURS
Status: DISCONTINUED | OUTPATIENT
Start: 2025-07-21 | End: 2025-07-24 | Stop reason: HOSPADM

## 2025-07-21 RX ORDER — BISACODYL 10 MG/1
10 SUPPOSITORY RECTAL DAILY PRN
Status: DISCONTINUED | OUTPATIENT
Start: 2025-07-21 | End: 2025-07-24 | Stop reason: HOSPADM

## 2025-07-21 RX ORDER — IPRATROPIUM BROMIDE AND ALBUTEROL SULFATE 2.5; .5 MG/3ML; MG/3ML
3 SOLUTION RESPIRATORY (INHALATION)
Status: DISCONTINUED | OUTPATIENT
Start: 2025-07-21 | End: 2025-07-21

## 2025-07-21 RX ORDER — IPRATROPIUM BROMIDE AND ALBUTEROL SULFATE 2.5; .5 MG/3ML; MG/3ML
3 SOLUTION RESPIRATORY (INHALATION)
Status: DISCONTINUED | OUTPATIENT
Start: 2025-07-21 | End: 2025-07-24 | Stop reason: HOSPADM

## 2025-07-21 RX ORDER — THIAMINE HYDROCHLORIDE 100 MG/ML
100 INJECTION, SOLUTION INTRAMUSCULAR; INTRAVENOUS DAILY
Status: COMPLETED | OUTPATIENT
Start: 2025-07-21 | End: 2025-07-23

## 2025-07-21 RX ORDER — ONDANSETRON HYDROCHLORIDE 2 MG/ML
4 INJECTION, SOLUTION INTRAVENOUS EVERY 8 HOURS PRN
Status: DISCONTINUED | OUTPATIENT
Start: 2025-07-21 | End: 2025-07-24 | Stop reason: HOSPADM

## 2025-07-21 RX ORDER — LORAZEPAM 1 MG/1
1 TABLET ORAL EVERY 2 HOUR PRN
Status: DISCONTINUED | OUTPATIENT
Start: 2025-07-21 | End: 2025-07-24 | Stop reason: HOSPADM

## 2025-07-21 RX ORDER — PANTOPRAZOLE SODIUM 40 MG/1
40 TABLET, DELAYED RELEASE ORAL
Status: DISCONTINUED | OUTPATIENT
Start: 2025-07-22 | End: 2025-07-24 | Stop reason: HOSPADM

## 2025-07-21 RX ORDER — ONDANSETRON 4 MG/1
4 TABLET, FILM COATED ORAL EVERY 8 HOURS PRN
Status: DISCONTINUED | OUTPATIENT
Start: 2025-07-21 | End: 2025-07-24 | Stop reason: HOSPADM

## 2025-07-21 RX ORDER — GUAIFENESIN 600 MG/1
600 TABLET, EXTENDED RELEASE ORAL EVERY 12 HOURS PRN
Status: DISCONTINUED | OUTPATIENT
Start: 2025-07-21 | End: 2025-07-24 | Stop reason: HOSPADM

## 2025-07-21 RX ORDER — IBUPROFEN 200 MG
1 TABLET ORAL DAILY
Status: DISCONTINUED | OUTPATIENT
Start: 2025-07-21 | End: 2025-07-24 | Stop reason: HOSPADM

## 2025-07-21 RX ORDER — FOLIC ACID 1 MG/1
1 TABLET ORAL DAILY
Status: DISCONTINUED | OUTPATIENT
Start: 2025-07-21 | End: 2025-07-24 | Stop reason: HOSPADM

## 2025-07-21 RX ORDER — LORAZEPAM 0.5 MG/1
0.5 TABLET ORAL EVERY 2 HOUR PRN
Status: DISCONTINUED | OUTPATIENT
Start: 2025-07-21 | End: 2025-07-24 | Stop reason: HOSPADM

## 2025-07-21 RX ORDER — PANTOPRAZOLE SODIUM 40 MG/10ML
40 INJECTION, POWDER, LYOPHILIZED, FOR SOLUTION INTRAVENOUS
Status: DISCONTINUED | OUTPATIENT
Start: 2025-07-22 | End: 2025-07-24 | Stop reason: HOSPADM

## 2025-07-21 RX ORDER — LORAZEPAM 1 MG/1
2 TABLET ORAL EVERY 2 HOUR PRN
Status: DISCONTINUED | OUTPATIENT
Start: 2025-07-21 | End: 2025-07-24 | Stop reason: HOSPADM

## 2025-07-21 RX ORDER — AMLODIPINE BESYLATE 5 MG/1
2.5 TABLET ORAL DAILY
Status: DISCONTINUED | OUTPATIENT
Start: 2025-07-21 | End: 2025-07-24 | Stop reason: HOSPADM

## 2025-07-21 RX ORDER — MULTIVIT-MIN/IRON FUM/FOLIC AC 7.5 MG-4
1 TABLET ORAL DAILY
Status: DISCONTINUED | OUTPATIENT
Start: 2025-07-21 | End: 2025-07-24 | Stop reason: HOSPADM

## 2025-07-21 RX ORDER — CITALOPRAM 20 MG/1
20 TABLET ORAL DAILY
Status: DISCONTINUED | OUTPATIENT
Start: 2025-07-21 | End: 2025-07-24 | Stop reason: HOSPADM

## 2025-07-21 RX ORDER — POLYETHYLENE GLYCOL 3350 17 G/17G
17 POWDER, FOR SOLUTION ORAL DAILY
Status: DISCONTINUED | OUTPATIENT
Start: 2025-07-21 | End: 2025-07-24 | Stop reason: HOSPADM

## 2025-07-21 RX ORDER — ACETAMINOPHEN 325 MG/1
650 TABLET ORAL EVERY 4 HOURS PRN
Status: DISCONTINUED | OUTPATIENT
Start: 2025-07-21 | End: 2025-07-24 | Stop reason: HOSPADM

## 2025-07-21 RX ORDER — FLUTICASONE FUROATE AND VILANTEROL 200; 25 UG/1; UG/1
1 POWDER RESPIRATORY (INHALATION)
Status: DISCONTINUED | OUTPATIENT
Start: 2025-07-21 | End: 2025-07-24 | Stop reason: HOSPADM

## 2025-07-21 RX ADMIN — METHYLPREDNISOLONE SODIUM SUCCINATE 40 MG: 40 INJECTION, POWDER, FOR SOLUTION INTRAMUSCULAR; INTRAVENOUS at 21:17

## 2025-07-21 RX ADMIN — AMLODIPINE BESYLATE 2.5 MG: 5 TABLET ORAL at 11:52

## 2025-07-21 RX ADMIN — LISINOPRIL 40 MG: 10 TABLET ORAL at 11:52

## 2025-07-21 RX ADMIN — Medication 3 MG: at 22:42

## 2025-07-21 RX ADMIN — Medication 1 TABLET: at 11:52

## 2025-07-21 RX ADMIN — IPRATROPIUM BROMIDE AND ALBUTEROL SULFATE 3 ML: 2.5; .5 SOLUTION RESPIRATORY (INHALATION) at 11:18

## 2025-07-21 RX ADMIN — IPRATROPIUM BROMIDE AND ALBUTEROL SULFATE 3 ML: 2.5; .5 SOLUTION RESPIRATORY (INHALATION) at 09:19

## 2025-07-21 RX ADMIN — SODIUM CHLORIDE 1000 ML: 0.9 INJECTION, SOLUTION INTRAVENOUS at 08:27

## 2025-07-21 RX ADMIN — FOLIC ACID 1 MG: 1 TABLET ORAL at 11:52

## 2025-07-21 RX ADMIN — METHYLPREDNISOLONE SODIUM SUCCINATE 40 MG: 40 INJECTION, POWDER, FOR SOLUTION INTRAMUSCULAR; INTRAVENOUS at 14:07

## 2025-07-21 RX ADMIN — IPRATROPIUM BROMIDE AND ALBUTEROL SULFATE 3 ML: 2.5; .5 SOLUTION RESPIRATORY (INHALATION) at 20:20

## 2025-07-21 RX ADMIN — IPRATROPIUM BROMIDE AND ALBUTEROL SULFATE 3 ML: 2.5; .5 SOLUTION RESPIRATORY (INHALATION) at 09:04

## 2025-07-21 RX ADMIN — IPRATROPIUM BROMIDE AND ALBUTEROL SULFATE 3 ML: 2.5; .5 SOLUTION RESPIRATORY (INHALATION) at 08:28

## 2025-07-21 RX ADMIN — THIAMINE HYDROCHLORIDE 100 MG: 100 INJECTION, SOLUTION INTRAMUSCULAR; INTRAVENOUS at 11:55

## 2025-07-21 RX ADMIN — CITALOPRAM HYDROBROMIDE 20 MG: 20 TABLET ORAL at 11:52

## 2025-07-21 RX ADMIN — LORAZEPAM 1 MG: 1 TABLET ORAL at 22:42

## 2025-07-21 SDOH — HEALTH STABILITY: PHYSICAL HEALTH: ON AVERAGE, HOW MANY DAYS PER WEEK DO YOU ENGAGE IN MODERATE TO STRENUOUS EXERCISE (LIKE A BRISK WALK)?: 0 DAYS

## 2025-07-21 SDOH — ECONOMIC STABILITY: FOOD INSECURITY: WITHIN THE PAST 12 MONTHS, THE FOOD YOU BOUGHT JUST DIDN'T LAST AND YOU DIDN'T HAVE MONEY TO GET MORE.: NEVER TRUE

## 2025-07-21 SDOH — SOCIAL STABILITY: SOCIAL NETWORK
DO YOU BELONG TO ANY CLUBS OR ORGANIZATIONS SUCH AS CHURCH GROUPS, UNIONS, FRATERNAL OR ATHLETIC GROUPS, OR SCHOOL GROUPS?: NO

## 2025-07-21 SDOH — SOCIAL STABILITY: SOCIAL INSECURITY: WITHIN THE LAST YEAR, HAVE YOU BEEN AFRAID OF YOUR PARTNER OR EX-PARTNER?: NO

## 2025-07-21 SDOH — ECONOMIC STABILITY: INCOME INSECURITY: IN THE PAST 12 MONTHS HAS THE ELECTRIC, GAS, OIL, OR WATER COMPANY THREATENED TO SHUT OFF SERVICES IN YOUR HOME?: NO

## 2025-07-21 SDOH — SOCIAL STABILITY: SOCIAL INSECURITY
WITHIN THE LAST YEAR, HAVE YOU BEEN RAPED OR FORCED TO HAVE ANY KIND OF SEXUAL ACTIVITY BY YOUR PARTNER OR EX-PARTNER?: NO

## 2025-07-21 SDOH — ECONOMIC STABILITY: FOOD INSECURITY: HOW HARD IS IT FOR YOU TO PAY FOR THE VERY BASICS LIKE FOOD, HOUSING, MEDICAL CARE, AND HEATING?: NOT HARD AT ALL

## 2025-07-21 SDOH — SOCIAL STABILITY: SOCIAL NETWORK: HOW OFTEN DO YOU GET TOGETHER WITH FRIENDS OR RELATIVES?: THREE TIMES A WEEK

## 2025-07-21 SDOH — HEALTH STABILITY: MENTAL HEALTH
DO YOU FEEL STRESS - TENSE, RESTLESS, NERVOUS, OR ANXIOUS, OR UNABLE TO SLEEP AT NIGHT BECAUSE YOUR MIND IS TROUBLED ALL THE TIME - THESE DAYS?: ONLY A LITTLE

## 2025-07-21 SDOH — HEALTH STABILITY: PHYSICAL HEALTH
HOW OFTEN DO YOU NEED TO HAVE SOMEONE HELP YOU WHEN YOU READ INSTRUCTIONS, PAMPHLETS, OR OTHER WRITTEN MATERIAL FROM YOUR DOCTOR OR PHARMACY?: NEVER

## 2025-07-21 SDOH — SOCIAL STABILITY: SOCIAL INSECURITY: DO YOU FEEL UNSAFE GOING BACK TO THE PLACE WHERE YOU ARE LIVING?: NO

## 2025-07-21 SDOH — SOCIAL STABILITY: SOCIAL NETWORK: HOW OFTEN DO YOU ATTEND MEETINGS OF THE CLUBS OR ORGANIZATIONS YOU BELONG TO?: PATIENT DECLINED

## 2025-07-21 SDOH — ECONOMIC STABILITY: FOOD INSECURITY: WITHIN THE PAST 12 MONTHS, YOU WORRIED THAT YOUR FOOD WOULD RUN OUT BEFORE YOU GOT THE MONEY TO BUY MORE.: NEVER TRUE

## 2025-07-21 SDOH — SOCIAL STABILITY: SOCIAL NETWORK
IN A TYPICAL WEEK, HOW MANY TIMES DO YOU TALK ON THE PHONE WITH FAMILY, FRIENDS, OR NEIGHBORS?: MORE THAN THREE TIMES A WEEK

## 2025-07-21 SDOH — SOCIAL STABILITY: SOCIAL INSECURITY: ARE YOU MARRIED, WIDOWED, DIVORCED, SEPARATED, NEVER MARRIED, OR LIVING WITH A PARTNER?: DIVORCED

## 2025-07-21 SDOH — SOCIAL STABILITY: SOCIAL INSECURITY: HAVE YOU HAD THOUGHTS OF HARMING ANYONE ELSE?: NO

## 2025-07-21 SDOH — SOCIAL STABILITY: SOCIAL INSECURITY
WITHIN THE LAST YEAR, HAVE YOU BEEN KICKED, HIT, SLAPPED, OR OTHERWISE PHYSICALLY HURT BY YOUR PARTNER OR EX-PARTNER?: NO

## 2025-07-21 SDOH — SOCIAL STABILITY: SOCIAL INSECURITY: HAS ANYONE EVER THREATENED TO HURT YOUR FAMILY OR YOUR PETS?: NO

## 2025-07-21 SDOH — SOCIAL STABILITY: SOCIAL INSECURITY: WITHIN THE LAST YEAR, HAVE YOU BEEN HUMILIATED OR EMOTIONALLY ABUSED IN OTHER WAYS BY YOUR PARTNER OR EX-PARTNER?: NO

## 2025-07-21 SDOH — SOCIAL STABILITY: SOCIAL INSECURITY: DO YOU FEEL ANYONE HAS EXPLOITED OR TAKEN ADVANTAGE OF YOU FINANCIALLY OR OF YOUR PERSONAL PROPERTY?: NO

## 2025-07-21 SDOH — SOCIAL STABILITY: SOCIAL INSECURITY: ABUSE: ADULT

## 2025-07-21 SDOH — SOCIAL STABILITY: SOCIAL NETWORK: HOW OFTEN DO YOU ATTEND CHURCH OR RELIGIOUS SERVICES?: 1 TO 4 TIMES PER YEAR

## 2025-07-21 SDOH — SOCIAL STABILITY: SOCIAL INSECURITY: ARE THERE ANY APPARENT SIGNS OF INJURIES/BEHAVIORS THAT COULD BE RELATED TO ABUSE/NEGLECT?: NO

## 2025-07-21 SDOH — SOCIAL STABILITY: SOCIAL INSECURITY: ARE YOU OR HAVE YOU BEEN THREATENED OR ABUSED PHYSICALLY, EMOTIONALLY, OR SEXUALLY BY ANYONE?: NO

## 2025-07-21 SDOH — SOCIAL STABILITY: SOCIAL INSECURITY: HAVE YOU HAD ANY THOUGHTS OF HARMING ANYONE ELSE?: NO

## 2025-07-21 SDOH — HEALTH STABILITY: PHYSICAL HEALTH: ON AVERAGE, HOW MANY MINUTES DO YOU ENGAGE IN EXERCISE AT THIS LEVEL?: 0 MIN

## 2025-07-21 SDOH — SOCIAL STABILITY: SOCIAL INSECURITY: DOES ANYONE TRY TO KEEP YOU FROM HAVING/CONTACTING OTHER FRIENDS OR DOING THINGS OUTSIDE YOUR HOME?: NO

## 2025-07-21 ASSESSMENT — ACTIVITIES OF DAILY LIVING (ADL)
ADL_ASSISTANCE: INDEPENDENT
WALKS IN HOME: INDEPENDENT
ADEQUATE_TO_COMPLETE_ADL: YES
DRESSING YOURSELF: INDEPENDENT
TOILETING: INDEPENDENT
JUDGMENT_ADEQUATE_SAFELY_COMPLETE_DAILY_ACTIVITIES: YES
FEEDING YOURSELF: INDEPENDENT
BATHING_ASSISTANCE: MODERATE
LACK_OF_TRANSPORTATION: NO
ASSISTIVE_DEVICE: EYEGLASSES
BATHING: INDEPENDENT
PATIENT'S MEMORY ADEQUATE TO SAFELY COMPLETE DAILY ACTIVITIES?: YES
HEARING - LEFT EAR: FUNCTIONAL
GROOMING: INDEPENDENT
HEARING - RIGHT EAR: FUNCTIONAL

## 2025-07-21 ASSESSMENT — LIFESTYLE VARIABLES
PAROXYSMAL SWEATS: NO SWEAT VISIBLE
NAUSEA AND VOMITING: NO NAUSEA AND NO VOMITING
HEADACHE, FULLNESS IN HEAD: NOT PRESENT
AUDITORY DISTURBANCES: NOT PRESENT
PAROXYSMAL SWEATS: NO SWEAT VISIBLE
NAUSEA AND VOMITING: NO NAUSEA AND NO VOMITING
NAUSEA AND VOMITING: NO NAUSEA AND NO VOMITING
ANXIETY: 3
TOTAL SCORE: 0
HOW MANY STANDARD DRINKS CONTAINING ALCOHOL DO YOU HAVE ON A TYPICAL DAY: 3 OR 4
ANXIETY: MODERATELY ANXIOUS, OR GUARDED, SO ANXIETY IS INFERRED
NAUSEA AND VOMITING: NO NAUSEA AND NO VOMITING
HOW OFTEN DO YOU HAVE A DRINK CONTAINING ALCOHOL: 4 OR MORE TIMES A WEEK
AUDIT-C TOTAL SCORE: 7
TREMOR: NO TREMOR
NAUSEA AND VOMITING: NO NAUSEA AND NO VOMITING
TREMOR: NO TREMOR
TREMOR: NO TREMOR
AUDITORY DISTURBANCES: NOT PRESENT
AUDITORY DISTURBANCES: NOT PRESENT
HEADACHE, FULLNESS IN HEAD: NOT PRESENT
ANXIETY: NO ANXIETY, AT EASE
TOTAL SCORE: 2
ORIENTATION AND CLOUDING OF SENSORIUM: ORIENTED AND CAN DO SERIAL ADDITIONS
AGITATION: NORMAL ACTIVITY
PAROXYSMAL SWEATS: NO SWEAT VISIBLE
TOTAL SCORE: 1
HEADACHE, FULLNESS IN HEAD: NOT PRESENT
ANXIETY: 2
TREMOR: NO TREMOR
HOW OFTEN DO YOU HAVE 6 OR MORE DRINKS ON ONE OCCASION: MONTHLY
HEADACHE, FULLNESS IN HEAD: NOT PRESENT
EVER HAD A DRINK FIRST THING IN THE MORNING TO STEADY YOUR NERVES TO GET RID OF A HANGOVER: NO
TREMOR: NO TREMOR
VISUAL DISTURBANCES: NOT PRESENT
EVER FELT BAD OR GUILTY ABOUT YOUR DRINKING: NO
PULSE: 92
NAUSEA AND VOMITING: NO NAUSEA AND NO VOMITING
ORIENTATION AND CLOUDING OF SENSORIUM: ORIENTED AND CAN DO SERIAL ADDITIONS
AGITATION: NORMAL ACTIVITY
HEADACHE, FULLNESS IN HEAD: MODERATELY SEVERE
HAVE YOU EVER FELT YOU SHOULD CUT DOWN ON YOUR DRINKING: YES
AGITATION: 2
TOTAL SCORE: 0
AGITATION: NORMAL ACTIVITY
ORIENTATION AND CLOUDING OF SENSORIUM: ORIENTED AND CAN DO SERIAL ADDITIONS
AGITATION: NORMAL ACTIVITY
PAROXYSMAL SWEATS: NO SWEAT VISIBLE
VISUAL DISTURBANCES: NOT PRESENT
VISUAL DISTURBANCES: NOT PRESENT
HEADACHE, FULLNESS IN HEAD: NOT PRESENT
BLOOD PRESSURE: 164/93
VISUAL DISTURBANCES: NOT PRESENT
AUDITORY DISTURBANCES: NOT PRESENT
HAVE PEOPLE ANNOYED YOU BY CRITICIZING YOUR DRINKING: NO
BLOOD PRESSURE: 171/90
AUDIT-C TOTAL SCORE: 7
PAROXYSMAL SWEATS: NO SWEAT VISIBLE
ANXIETY: NO ANXIETY, AT EASE
ORIENTATION AND CLOUDING OF SENSORIUM: ORIENTED AND CAN DO SERIAL ADDITIONS
AUDITORY DISTURBANCES: NOT PRESENT
ORIENTATION AND CLOUDING OF SENSORIUM: ORIENTED AND CAN DO SERIAL ADDITIONS
SKIP TO QUESTIONS 9-10: 0
PAROXYSMAL SWEATS: NO SWEAT VISIBLE
VISUAL DISTURBANCES: NOT PRESENT
VISUAL DISTURBANCES: NOT PRESENT
AGITATION: NORMAL ACTIVITY
TOTAL SCORE: 8
TREMOR: NO TREMOR
ORIENTATION AND CLOUDING OF SENSORIUM: ORIENTED AND CAN DO SERIAL ADDITIONS
AUDITORY DISTURBANCES: NOT PRESENT
TOTAL SCORE: 5
PULSE: 105
ANXIETY: NO ANXIETY, AT EASE
TOTAL SCORE: 0

## 2025-07-21 ASSESSMENT — COGNITIVE AND FUNCTIONAL STATUS - GENERAL
MOBILITY SCORE: 24
TOILETING: A LOT
MOBILITY SCORE: 24
PERSONAL GROOMING: A LITTLE
MOBILITY SCORE: 24
PATIENT BASELINE BEDBOUND: NO
PATIENT BASELINE BEDBOUND: NO
DAILY ACTIVITIY SCORE: 24
DRESSING REGULAR UPPER BODY CLOTHING: A LITTLE
DAILY ACTIVITIY SCORE: 16
DAILY ACTIVITIY SCORE: 24
DRESSING REGULAR LOWER BODY CLOTHING: A LOT
HELP NEEDED FOR BATHING: A LOT
PATIENT BASELINE BEDBOUND: NO

## 2025-07-21 ASSESSMENT — PAIN SCALES - GENERAL
PAINLEVEL_OUTOF10: 0 - NO PAIN
PAINLEVEL_OUTOF10: 8
PAINLEVEL_OUTOF10: 1
PAINLEVEL_OUTOF10: 0 - NO PAIN
PAINLEVEL_OUTOF10: 0 - NO PAIN

## 2025-07-21 ASSESSMENT — PAIN DESCRIPTION - DESCRIPTORS: DESCRIPTORS: ACHING

## 2025-07-21 ASSESSMENT — PAIN DESCRIPTION - PAIN TYPE: TYPE: ACUTE PAIN

## 2025-07-21 ASSESSMENT — PAIN - FUNCTIONAL ASSESSMENT
PAIN_FUNCTIONAL_ASSESSMENT: 0-10

## 2025-07-21 ASSESSMENT — PAIN DESCRIPTION - ORIENTATION: ORIENTATION: LEFT

## 2025-07-21 ASSESSMENT — PAIN DESCRIPTION - LOCATION: LOCATION: ELBOW

## 2025-07-21 ASSESSMENT — PATIENT HEALTH QUESTIONNAIRE - PHQ9
2. FEELING DOWN, DEPRESSED OR HOPELESS: NOT AT ALL
SUM OF ALL RESPONSES TO PHQ9 QUESTIONS 1 & 2: 0
1. LITTLE INTEREST OR PLEASURE IN DOING THINGS: NOT AT ALL

## 2025-07-21 NOTE — H&P
[facility] - GENERAL MEDICINE HISTORY AND PHYSICAL    HISTORY OF PRESENT ILLNESS     History Obtained From (Primary Source): The patient  Collateral History (Secondary Sources): D/w ED    History Of Present Illness (HPI):  Rosa Ch is a 75 y.o. female with PMHx s/f COPD and emphysema, daily alcohol use, active tobacco dependence, hypertension, hyperlipidemia presenting with shortness of breath and syncopal episode yesterday.  Patient has been taking Trelegy for an inhaler believe she is having adverse reaction to this with increased mucus buildup.  Last night she had severe episode with intractable coughing was gasping for breath patient did have syncopal episode.  Patient has not been experiencing any fevers or chills no chest pain.  In the emergency department patient workup suggested hypoxia with COPD exacerbation.  Patient did get Solu-Medrol prior to arrival nebulizer treatments with symptomatic improvement.  Case was discussed with the ED provider plan is to admit patient for further workup and evaluation it is unclear if length of stay will need to exceed 2 midnights.    ED Course:   Vitals on presentation: T 36.7 °C (98.1 °F)  HR (!) 104  BP (!) 184/98  RR 14  O2 (!) 90 % None (Room air)  Labs:   CBC with WBC 8.3, Hgb 21.0, Plts 213.   CMP with glucose 89, Na 138, K 4.1, BUN 6, sCr 0.34, alk phos 71, ALT 20, AST 22, bilirubin 1.4. Magnesium No results found for requested labs within last 365 days..   BNP 46. Trop 4.   Lactate No results found for requested labs within last 365 days.  EKG: Sinus rhythm QTc 463 ms no ST segment to suggest acute ischemia  Imaging - agree with radiology interpretation(s):   Interventions: DuoNeb treatment, normal saline    12-point ROS reviewed and found to be negative aside from aforementioned positives in HPI and/or noted in dedicated ROS section below.     Decision made to admit the patient to the hospitalist service after evaluation of the patient,  review of the above, and discussion with ED provider.     LABS AND IMAGING     I have personally reviewed the following labs from 07/21/25: CBC, Troponin, and BNP  I have personally reviewed any obtained EKGs on 07/21/25, with my interpretation as listed above in the ED summary course.   I have personally reviewed the patient's vitals on presentation to the ED and any/all changes through to time of admission (on 07/21/25).     ED Course (From ED Provider):  ED Course as of 07/21/25 1010   Mon Jul 21, 2025   0831 EKG as interpreted by myself demonstrates sinus rhythm with ventricular rate of 91, normal axis, normal intervals, evidence of biatrial enlargement without any evidence of an acute STEMI. [NS]      ED Course User Index  [NS] Felipe Camacho MD         Diagnoses as of 07/21/25 1010   COPD exacerbation (Multi)   Hypoxia     Relevant Results  Results for orders placed or performed during the hospital encounter of 07/21/25 (from the past 24 hours)   Blood Gas, Venous Full Panel   Result Value Ref Range    POCT pH, Venous 7.36 7.33 - 7.43 pH    POCT pCO2, Venous 64 (H) 41 - 51 mm Hg    POCT pO2, Venous 35 35 - 45 mm Hg    POCT SO2, Venous 64 45 - 75 %    POCT Oxy Hemoglobin, Venous 58.3 45.0 - 75.0 %    POCT Hematocrit Calculated, Venous 65.0 (H) 36.0 - 46.0 %    POCT Sodium, Venous 134 (L) 136 - 145 mmol/L    POCT Potassium, Venous 4.1 3.5 - 5.3 mmol/L    POCT Chloride, Venous 95 (L) 98 - 107 mmol/L    POCT Ionized Calicum, Venous 1.09 (L) 1.10 - 1.33 mmol/L    POCT Glucose, Venous 93 74 - 99 mg/dL    POCT Lactate, Venous 1.1 0.4 - 2.0 mmol/L    POCT Base Excess, Venous 6.9 (H) -2.0 - 3.0 mmol/L    POCT HCO3 Calculated, Venous 36.2 (H) 22.0 - 26.0 mmol/L    POCT Hemoglobin, Venous 21.7 (H) 12.0 - 16.0 g/dL    POCT Anion Gap, Venous 7.0 (L) 10.0 - 25.0 mmol/L    Patient Temperature 37.0 degrees Celsius    FiO2 29 %   CBC and Auto Differential   Result Value Ref Range    WBC 8.3 4.4 - 11.3 x10*3/uL     nRBC 0.0 0.0 - 0.0 /100 WBCs    RBC 6.23 (H) 4.00 - 5.20 x10*6/uL    Hemoglobin 21.0 (H) 12.0 - 16.0 g/dL    Hematocrit 61.5 (H) 36.0 - 46.0 %    MCV 99 80 - 100 fL    MCH 33.7 26.0 - 34.0 pg    MCHC 34.1 32.0 - 36.0 g/dL    RDW 13.2 11.5 - 14.5 %    Platelets 213 150 - 450 x10*3/uL    Neutrophils % 75.4 40.0 - 80.0 %    Immature Granulocytes %, Automated 0.2 0.0 - 0.9 %    Lymphocytes % 14.5 13.0 - 44.0 %    Monocytes % 8.0 2.0 - 10.0 %    Eosinophils % 1.2 0.0 - 6.0 %    Basophils % 0.7 0.0 - 2.0 %    Neutrophils Absolute 6.27 (H) 1.60 - 5.50 x10*3/uL    Immature Granulocytes Absolute, Automated 0.02 0.00 - 0.50 x10*3/uL    Lymphocytes Absolute 1.21 0.80 - 3.00 x10*3/uL    Monocytes Absolute 0.67 0.05 - 0.80 x10*3/uL    Eosinophils Absolute 0.10 0.00 - 0.40 x10*3/uL    Basophils Absolute 0.06 0.00 - 0.10 x10*3/uL   Comprehensive metabolic panel   Result Value Ref Range    Glucose 89 74 - 99 mg/dL    Sodium 138 136 - 145 mmol/L    Potassium 4.1 3.5 - 5.3 mmol/L    Chloride 96 (L) 98 - 107 mmol/L    Bicarbonate 33 (H) 21 - 32 mmol/L    Anion Gap 13 10 - 20 mmol/L    Urea Nitrogen 6 6 - 23 mg/dL    Creatinine 0.34 (L) 0.50 - 1.05 mg/dL    eGFR >90 >60 mL/min/1.73m*2    Calcium 9.2 8.6 - 10.3 mg/dL    Albumin 4.7 3.4 - 5.0 g/dL    Alkaline Phosphatase 71 33 - 136 U/L    Total Protein 8.1 6.4 - 8.2 g/dL    AST 22 9 - 39 U/L    Bilirubin, Total 1.4 (H) 0.0 - 1.2 mg/dL    ALT 20 7 - 45 U/L   B-Type Natriuretic Peptide   Result Value Ref Range    BNP 46 0 - 99 pg/mL   Troponin I, High Sensitivity, Initial   Result Value Ref Range    Troponin I, High Sensitivity 5 0 - 13 ng/L   Troponin, High Sensitivity, 1 Hour   Result Value Ref Range    Troponin I, High Sensitivity 4 0 - 13 ng/L      Imaging  XR elbow left 3+ views  Result Date: 7/21/2025  No acute findings.     MACRO: None   Signed by: Joseph Schoenberger 7/21/2025 9:21 AM Dictation workstation:   [id number]    XR chest 2 views  Result Date: 7/21/2025  1.  No  definite acute findings. See discussion above       MACRO: None   Signed by: Joseph Schoenberger 7/21/2025 9:19 AM Dictation workstation:   [id number]      Cardiology, Vascular, and Other Imaging  No other imaging results found for the past 2 days       PAST HISTORIES AND ALLERGIES     Past Medical History  She has a past medical history of COPD (chronic obstructive pulmonary disease) (Multi) and Hypertension.    Surgical History  She has a past surgical history that includes Tubal ligation (03/16/2016) and Facial cosmetic surgery.     Social History  She reports that she has been smoking cigarettes. She has quit using smokeless tobacco. She reports current alcohol use of about 12.0 standard drinks of alcohol per week. She reports that she does not use drugs.    Family History  Family History[1]    Allergies  Peanut, Penicillins, and Sulfa (sulfonamide antibiotics)    MEDICATIONS     Scheduled Medications:  Scheduled Medications[2]  Continuous Medications:  Continuous Medications[3]  PRN Medications:  PRN Medications[4]     REVIEW OF SYSTEMS     Review of Systems    OBJECTIVE     Last Recorded Vitals  BP (!) 228/111   Pulse 99   Temp 36.7 °C (98.1 °F) (Temporal)   Resp (!) 25   Wt 59 kg (130 lb)   SpO2 (!) 93%      Physical Exam:  Vital signs and nursing notes reviewed.   Constitutional: Pleasant and cooperative. Laying in bed in no acute distress. Conversant.   Skin: Warm and dry; no obvious lesions, rashes, pallor, or jaundice.   Eyes: EOMI. Anicteric sclera.   ENT: Mucous membranes moist; no obvious injury or deformity appreciated.   Head and Neck: Normocephalic, atraumatic. ROM preserved. Trachea midline. No appreciable JVD.   Respiratory: Nonlabored on 2 L. Lungs with poor excursion increased expiratory phase faint wheezing  Cardiovascular: RRR. No gross murmur, gallop, or rub. Extremities are warm and well-perfused with good capillary refill (< 3 seconds). No chest wall tenderness.   GI: Abdomen soft,  nontender, nondistended. No obvious organomegaly appreciated. Bowel sounds are present.  : No CVA tenderness.   MSK: No gross abnormalities appreciated. No limitations to AROM/PROM appreciated.   Extremities: No cyanosis, edema, or clubbing evident. Neurovascularly intact.   Neuro: A&Ox3. CN 2-12 grossly intact. Able to respond to questions appropriately and clearly. No acute focal neurologic deficits appreciated.  Psych: Appropriate mood and behavior.    ASSESSMENT AND PLAN   Assessment/Plan     75 y.o. female with PMHx s/f COPD and emphysema, daily alcohol use, active tobacco dependence, hypertension, hyperlipidemia presenting with shortness of breath and syncopal episode yesterday.    Acute hypoxic respiratory failure secondary to COPD exacerbation  Patient with significant emphysema by history has been on intermittent oxygen in the past  Substitute Breo and Spiriva for Trelegy patient thinks she had adverse reaction   check magnesium level and supplement if able      Syncope  Appears self-limited due to shortness of breath  Possibly related to alcohol use or coughing  Will check echocardiogram to exclude LV dysfunction or structural abnormality   Will orthostatic vital signs to exclude ongoing orthostatic hypotension    Concern for adverse reaction to Trelegy  *The patient has increased mucous from trilogy, has tolerated Symbicort in past, consider discharge on  Symbicort and Spiriva to be given separately      History of alcohol use  Continue patient on CIWA protocol supplemental thiamine    History of tobacco dependence  Cessation was advised  Nicotine replacement therapy ordered    Hypertension  We will reconcile patient's home medications     Cornelius Henderson, APRN-CNP    Dragon dictation software was used to dictate this note and thus there may be minor errors in translation/transcription including garbled speech or misspellings. Please contact for clarification if needed.         [1]   Family  History  Problem Relation Name Age of Onset    Breast cancer Mother      Other (bladder cancer) Mother      Hepatitis Sister      Arthritis Sister     [2] folic acid, 1 mg, oral, Daily  multivitamin with minerals, 1 tablet, oral, Daily  nicotine, 1 patch, transdermal, Daily  [START ON 7/24/2025] thiamine, 100 mg, oral, Daily  thiamine, 100 mg, intravenous, Daily    [3]    [4] PRN medications: LORazepam **OR** LORazepam **OR** LORazepam

## 2025-07-21 NOTE — PROGRESS NOTES
Rosa Ch is a 75 y.o. female admitted for COPD exacerbation (Multi). Pharmacy reviewed the patient's [id number] medications and allergies for accuracy.    The list below reflects the PTA list prior to pharmacy medication history. A summary a changes to the PTA medication list has been listed below. Please review each medication in order reconciliation for additional clarification and justification.    Source of information:  Spoke with pt & [facility]     Medications added:    Medications modified:    Medications to be removed:  Lovenox 40mg/0.4ml syringe  Primatene mist inhaler     Medications of concern:      Prior to Admission Medications   Prescriptions Last Dose Informant Patient Reported? Taking?   amLODIPine (Norvasc) 2.5 mg tablet 7/20/2025  No Yes   Sig: Take 1 tablet (2.5 mg) by mouth once daily.   citalopram (CeleXA) 20 mg tablet 7/20/2025  No Yes   Sig: Take 1 tablet (20 mg) by mouth once daily.   fluticasone-umeclidin-vilanter (Trelegy Ellipta) 200-62.5-25 mcg blister with device 7/20/2025  No Yes   Sig: Inhale 1 puff once daily.   lisinopril 40 mg tablet 7/20/2025  No Yes   Sig: Take 1 tablet (40 mg) by mouth once daily.      Facility-Administered Medications: None       NIKKI DIAZ

## 2025-07-21 NOTE — PROGRESS NOTES
Occupational Therapy  Evaluation    Patient Name: Rosa Ch  MRN: [id number]  Today's Date: 7/21/2025  Time Calculation  Start Time: 0300  Stop Time: 0314  Time Calculation (min): 14 min    Current Problem:   1. COPD exacerbation (Multi)    2. Hypoxia    3. Acute respiratory failure with hypoxia    4. Syncope and collapse        OT order: OT eval and treat   Referred by: Beatriz  Reason for referral: Decreased ADLs, COPD exacerbation, recent fall  Past medical history related to rehab:  has a past medical history of COPD (chronic obstructive pulmonary disease) (Multi) and Hypertension.     Precautions:   Medical Precautions: Fall precautions, Oxygen therapy device and L/min (2L/min NC)    ASSESSMENT  OT Assessment:  (OT eval completed. Pt demonstrated decreased activity tolerance, funnctional transfers and ADLs. Continued skilled OT services would benefit pt to address these needs.).    Barriers to discharge home: Physical needs     Intermittent mobility assistance needed, Intermittent ADL assistance needed     Tolerance: Patient limited by fatigue    PLAN  Frequency: 4 times per week (for this acute hospital stay)  Treatment Interventions: ADL retraining, Functional transfer training, Endurance training, Compensatory technique education  Discharge Recommendations: Moderate intensity level of continued care (Pending progress. Based on current functional status and rehab potential, patient is anticipated to tolerate and benefit from 5 or more days per week of skilled rehabilitative therapy after discharge from this acute inpatient hospitalization.)  OT OK to discharge: Yes    GENERAL VISIT INFORMATION   Start of session communication: Bedside nurse  End of session communication: Bedside nurse  Family/caregiver present: No    Position Pt Received:  Bed, 3 rail up, Alarm on  End of session position: Bed, 3 rail up, Alarm on    SUBJECTIVE  Home Living:  Type of Home: Apartment  Lives With: Alone  Home Layout: One  "level  Home Access: Stairs to enter with rails  Entrance Stairs-Rails: Both  Entrance Stairs-Number of Steps: 2  Bathroom Shower/Tub: Tub/shower unit (Pt does not have a tub bench, but states she would like to get one because showering is difficult)  Home Living Comments:  (Pt drives, has assistance with shopping from friend, uses electric cart at the store. Pt uses a laundry service tht delivers for laundry.)     Prior Level of Function:  Level of [place]: Independent with ADLs and functional transfers, Needs assistance with homemaking  Receives Help From: Family, Friends  ADL Assistance: Independent  Ambulatory Assistance: Independent (no AD)  Prior Function Comments: P t drives, states uses O2 at home 2-3L/min but not all the time. Pt states she would like to get a home health aide to come on aregular basis.      Pain:  Assessment:  (Pt stated that L elbow hurt a little. Pt stated she fell on L elbow when she fell at home, significant bruising noted)      OBJECTIVE  Vital Signs:  Vitals Session: During OT  Vital Signs Comment:  (Pt in bed prior to TX, SpO2 86%, HR 98, sitting EOB SpO2 84%, , insturcted in inhalation through nose and pursed lip exhale.)    Cognition:  Overall Cognitive Status: Within Functional Limits      Current ADL function:   EATING:  Stand by     GROOMING: Minimal     BATHING: Moderate     UB DRESSING: Minimal     LB DRESSING: Moderate     TOILETING: Moderate        Activity Tolerance:  Endurance: Decreased tolerance for upright activites, Tolerates less than 10 min exercise with changes in vital signs (Pt anxiety affecting pt performance. Benefitted from instruction to take slow breaths, instructed to \"smell the flowers\" and \"blow out the candle\" Pt responded well.)    Bed Mobility/Transfers:   Bed Mobility  Bed Mobility:  (supine > sit with modA, sit > supine SBA, scooting laterally in bed with CGA with cues for hand placement)    Sitting Balance:  Static Sitting Balance  Static " Sitting-Level of Assistance: Close supervision  Dynamic Sitting Balance  Dynamic Sitting-Level of Assistance: Contact guard      Sensation:  Sensation Comment:  (Pt denies numbness and tingling)    Strength:  Strength Comments:  (BUEs grossly 4/5)    Coordination:  Movements are Fluid and Coordinated: No (Movement not smooth, somewhat jerky and irregular)     Hand Function:  Hand Function  Gross Grasp: Functional    Extremities: RUE   RUE : Within Functional Limits and LUE   LUE: Within Functional Limits    Outcome Measures: [facility] Daily Activity   Putting on and taking off regular lower body clothing: A lot  Bathing (including washing, rinsing, drying): A lot  Putting on and taking off regular upper body clothing: A little  Toileting, which includes using toilet, bedpan or urinal: A lot  Taking care of personal grooming such as brushing teeth: A little   Eating Meals: None   Daily Activity - Total Score: 16    EDUCATION:     Education Documentation  Precautions, taught by MARY Siddiqui at 7/21/2025  4:04 PM.  Learner: Patient  Readiness: Acceptance  Method: Explanation  Response: Needs Reinforcement  Comment: Patient instructed in pursed lip breathing and taking inhalation breath through nose    Education Comments  No comments found.        Goals:   Encounter Problems       Encounter Problems (Active)       ADLs       Patient with complete LB dressing with SBA donning and doffing all LE clothes  with PRN adaptive equipment while supported sitting demonstrating energy conservation techniques. (Progressing)       Start:  07/21/25    Expected End:  08/04/25            Patient will complete toileting including hygiene & clothing management with SBA at bathroom level. (Progressing)       Start:  07/21/25    Expected End:  08/04/25               MOBILITY       Patient will perform Functional mobility mod Household distances with SBA and least restrictive device in order to improve safety and functional mobility.  (Progressing)       Start:  07/21/25    Expected End:  08/04/25               TRANSFERS       Patient will complete functional transfers with least restrictive device with Mod I for improved safety. (Progressing)       Start:  07/21/25    Expected End:  08/04/25               Completion of this session, clinical decision making, and documentation performed under the supervision/direction of STELLA Landin/RODRIGO OTOOLEOT

## 2025-07-21 NOTE — ED TRIAGE NOTES
Patient presents with Shortness of breath and left elbow pain from a fall yesterday. EMS gave patient 1 breathing treatment and 125 of solumedrol. Patient has a history of COPD. Patient states they drink and smoke everyday. Patient is supposed to wear oxygen at home but states they do not wear it at home. When EMS arrived at the house she was at 78% and not wearing oxygen. Patient states she passed out and fell while she was walking around bed. Patient states she woke up on the floor and she landed on her elbow. She states they she did not hit her head. Patient states she does not know how long she was out for and unknown if she fell because of drinking or because she wasn't wearing oxygen.

## 2025-07-21 NOTE — ED PROVIDER NOTES
"    HPI   Chief Complaint   Patient presents with    Shortness of Breath    Fall       HPI: Patient is a 75-year-old female, she has a past medical history of COPD not typically on any home oxygen, history of hypertension, history of hyperlipidemia, who is presenting to the emergency department for shortness of breath.  She reports an increase in gradual worsening of her shortness of breath over the past month or so, is associated with some chest tightness, little bit of a cough without any productivity, no fevers or chills, no nausea or vomiting, no lightheadedness or dizziness.  Reports the shortness of breath worsened today which brought her to the emergency department for further evaluation.  She arrived by EMS, at home she was saturating 78% on room air, she received a breathing treatment and steroids prior to arrival.  Of note, the patient states that she fell yesterday, passed out, syncopized, and landed on her left elbow.      ROS: Complete 12 point review of systems performed, otherwise negative except as noted in the history of present illness    PMH: Reviewed, documented below in note. Pertinents in HPI  PSH: Reviewed and documented below in note. Pertinents in HPI  SH: Daily alcohol and tobacco use.  Not homeless  Fam: Reviewed, noncontributory to patients current complaint  MEDS: Reviewed and documented below in note. Pertinents in HPI  ALLERGIES: Reviewed and documented below in note.        History provided by:  Patient   used: No                          [place] Coma Scale Score: 15                  Patient History   Medical History[1]  Surgical History[2]  Family History[3]  Social History[4]    Physical Exam   Visit Vitals  BP (!) 176/98   Pulse 92   Temp 36.7 °C (98.1 °F) (Temporal)   Resp (!) 27   Ht 1.676 m (5' 6\")   Wt 59 kg (130 lb)   SpO2 100%   BMI 20.98 kg/m²   OB Status Postmenopausal   Smoking Status Every Day   BSA 1.66 m²      Physical Exam  Vitals and nursing note " reviewed.   Constitutional:       General: She is in acute distress.      Appearance: Normal appearance. She is well-developed.   HENT:      Head: Normocephalic and atraumatic.   Neck:      Vascular: No carotid bruit.     Cardiovascular:      Rate and Rhythm: Regular rhythm. Tachycardia present.      Pulses: Normal pulses.      Heart sounds: Normal heart sounds.   Pulmonary:      Effort: Tachypnea and accessory muscle usage present. No respiratory distress.      Breath sounds: Wheezing present.   Abdominal:      General: There is no distension.      Palpations: Abdomen is soft.      Tenderness: There is no abdominal tenderness. There is no guarding or rebound.     Musculoskeletal:         General: No tenderness, deformity or signs of injury.      Cervical back: Normal range of motion. No rigidity.      Right lower leg: No edema.      Left lower leg: No edema.     Skin:     General: Skin is warm and dry.      Capillary Refill: Capillary refill takes less than 2 seconds.     Neurological:      General: No focal deficit present.      Mental Status: She is alert and oriented to person, place, and time.      Sensory: No sensory deficit.      Motor: No weakness.     Psychiatric:         Mood and Affect: Mood normal.         Behavior: Behavior normal.         XR chest 2 views   Final Result   1.  No definite acute findings. See discussion above                  MACRO:   None        Signed by: Joseph Schoenberger 7/21/2025 9:19 AM   Dictation workstation:   [id number]      XR elbow left 3+ views   Final Result   No acute findings.             MACRO:   None        Signed by: Joseph Schoenberger 7/21/2025 9:21 AM   Dictation workstation:   [id number]          Labs Reviewed   BLOOD GAS VENOUS FULL PANEL - Abnormal       Result Value    POCT pH, Venous 7.36      POCT pCO2, Venous 64 (*)     POCT pO2, Venous 35      POCT SO2, Venous 64      POCT Oxy Hemoglobin, Venous 58.3      POCT Hematocrit Calculated, Venous 65.0 (*)      POCT Sodium, Venous 134 (*)     POCT Potassium, Venous 4.1      POCT Chloride, Venous 95 (*)     POCT Ionized Calicum, Venous 1.09 (*)     POCT Glucose, Venous 93      POCT Lactate, Venous 1.1      POCT Base Excess, Venous 6.9 (*)     POCT HCO3 Calculated, Venous 36.2 (*)     POCT Hemoglobin, Venous 21.7 (*)     POCT Anion Gap, Venous 7.0 (*)     Patient Temperature 37.0      FiO2 29     COMPREHENSIVE METABOLIC PANEL - Abnormal    Glucose 89      Sodium 138      Potassium 4.1      Chloride 96 (*)     Bicarbonate 33 (*)     Anion Gap 13      Urea Nitrogen 6      Creatinine 0.34 (*)     eGFR >90      Calcium 9.2      Albumin 4.7      Alkaline Phosphatase 71      Total Protein 8.1      AST 22      Bilirubin, Total 1.4 (*)     ALT 20     B-TYPE NATRIURETIC PEPTIDE - Normal    BNP 46      Narrative:        <100 pg/mL - Heart failure unlikely  100-299 pg/mL - Intermediate probability of acute heart                  failure exacerbation. Correlate with clinical                  context and patient history.    >=300 pg/mL - Heart Failure likely. Correlate with clinical                  context and patient history.    BNP testing is performed using different testing methodology at [facility] than at other [facility]. Direct result comparisons should only be made within the same method.      SERIAL TROPONIN-INITIAL - Normal    Troponin I, High Sensitivity 5      Narrative:     Less than 99th percentile of normal range cutoff-  Female and children under 18 years old <14 ng/L; Male <21 ng/L: Negative  Repeat testing should be performed if clinically indicated.     Female and children under 18 years old 14-50 ng/L; Male 21-50 ng/L:  Consistent with possible cardiac damage and possible increased clinical   risk. Serial measurements may help to assess extent of myocardial damage.     >50 ng/L: Consistent with cardiac damage, increased clinical risk and  myocardial infarction. Serial measurements may help assess  extent of   myocardial damage.      NOTE: Children less than 1 year old may have higher baseline troponin   levels and results should be interpreted in conjunction with the overall   clinical context.     NOTE: Troponin I testing is performed using a different   testing methodology at [facility] than at other   [facility] hospitals. Direct result comparisons should only   be made within the same method.   CBC WITH AUTO DIFFERENTIAL   TROPONIN SERIES- (INITIAL, 1 HR)    Narrative:     The following orders were created for panel order Troponin I Series, High Sensitivity (0, 1 HR).  Procedure                               Abnormality         Status                     ---------                               -----------         ------                     Troponin I, High Sensiti...[[id number]]  Normal              Final result               Troponin, High Sensitivi...[[id number]]                                                   Please view results for these tests on the individual orders.   SERIAL TROPONIN, 1 HOUR         ED Course & MDM   ED Course as of 07/21/25 0923   Mon Jul 21, 2025   0831 EKG as interpreted by myself demonstrates sinus rhythm with ventricular rate of 91, normal axis, normal intervals, evidence of biatrial enlargement without any evidence of an acute STEMI. [NS]      ED Course User Index  [NS] Felipe Caamcho MD           Medical Decision Making         Your medication list        ASK your doctor about these medications        Instructions Last Dose Given Next Dose Due   amLODIPine 2.5 mg tablet  Commonly known as: Norvasc      Take 1 tablet (2.5 mg) by mouth once daily.       citalopram 20 mg tablet  Commonly known as: CeleXA      Take 1 tablet (20 mg) by mouth once daily.       enoxaparin 40 mg/0.4 mL syringe  Commonly known as: Lovenox           lisinopril 40 mg tablet      Take 1 tablet (40 mg) by mouth once daily.       Primatene Mist 0.125 mg/actuation HFA aerosol  inhaler  Generic drug: EPINEPHrine           Trelegy Ellipta 200-62.5-25 mcg blister with device  Generic drug: fluticasone-umeclidin-vilanter      Inhale 1 puff once daily.                Procedure  Procedures     *This report was transcribed using voice recognition software.  Every effort was made to ensure accuracy; however, inadvertent computerized transcription errors may be present.*  Felipe Camcaho MD  07/21/25           [1]   Past Medical History:  Diagnosis Date    COPD (chronic obstructive pulmonary disease) (Multi)     Hypertension    [2]   Past Surgical History:  Procedure Laterality Date    FACIAL COSMETIC SURGERY      TUBAL LIGATION  03/16/2016    Tubal Ligation   [3]   Family History  Problem Relation Name Age of Onset    Breast cancer Mother      Other (bladder cancer) Mother      Hepatitis Sister      Arthritis Sister     [4]   Social History  Tobacco Use    Smoking status: Every Day     Current packs/day: 1.00     Types: Cigarettes    Smokeless tobacco: Former   Substance Use Topics    Alcohol use: Yes     Alcohol/week: 12.0 standard drinks of alcohol     Types: 12 Standard drinks or equivalent per week     Comment: 2 beers at dinner, 2 glasses of wine after    Drug use: Never

## 2025-07-21 NOTE — CARE PLAN
The patient's goals for the shift include remain stable    The clinical goals for the shift include remain HDS, decreasae oxygen to baseline and remain free from falls and injury    Over the shift, the patient did not make progress toward the following goals. Barriers to progression include n/a. Recommendations to address these barriers include n/a.

## 2025-07-22 LAB
ANION GAP SERPL CALC-SCNC: 8 MMOL/L (ref 10–20)
BASOPHILS # BLD AUTO: 0.01 X10*3/UL (ref 0–0.1)
BASOPHILS NFR BLD AUTO: 0.2 %
BUN SERPL-MCNC: 9 MG/DL (ref 6–23)
CALCIUM SERPL-MCNC: 8.5 MG/DL (ref 8.6–10.3)
CHLORIDE SERPL-SCNC: 98 MMOL/L (ref 98–107)
CO2 SERPL-SCNC: 32 MMOL/L (ref 21–32)
CREAT SERPL-MCNC: 0.27 MG/DL (ref 0.5–1.05)
EGFRCR SERPLBLD CKD-EPI 2021: >90 ML/MIN/1.73M*2
EOSINOPHIL # BLD AUTO: 0.01 X10*3/UL (ref 0–0.4)
EOSINOPHIL NFR BLD AUTO: 0.2 %
ERYTHROCYTE [DISTWIDTH] IN BLOOD BY AUTOMATED COUNT: 12.9 % (ref 11.5–14.5)
GLUCOSE SERPL-MCNC: 134 MG/DL (ref 74–99)
HCT VFR BLD AUTO: 52.3 % (ref 36–46)
HGB BLD-MCNC: 17.3 G/DL (ref 12–16)
IMM GRANULOCYTES # BLD AUTO: 0.01 X10*3/UL (ref 0–0.5)
IMM GRANULOCYTES NFR BLD AUTO: 0.2 % (ref 0–0.9)
LYMPHOCYTES # BLD AUTO: 0.4 X10*3/UL (ref 0.8–3)
LYMPHOCYTES NFR BLD AUTO: 7.4 %
MAGNESIUM SERPL-MCNC: 1.97 MG/DL (ref 1.6–2.4)
MCH RBC QN AUTO: 33.1 PG (ref 26–34)
MCHC RBC AUTO-ENTMCNC: 33.1 G/DL (ref 32–36)
MCV RBC AUTO: 100 FL (ref 80–100)
MONOCYTES # BLD AUTO: 0.4 X10*3/UL (ref 0.05–0.8)
MONOCYTES NFR BLD AUTO: 7.4 %
NEUTROPHILS # BLD AUTO: 4.55 X10*3/UL (ref 1.6–5.5)
NEUTROPHILS NFR BLD AUTO: 84.6 %
NRBC BLD-RTO: 0 /100 WBCS (ref 0–0)
PLATELET # BLD AUTO: 207 X10*3/UL (ref 150–450)
POTASSIUM SERPL-SCNC: 4 MMOL/L (ref 3.5–5.3)
RBC # BLD AUTO: 5.23 X10*6/UL (ref 4–5.2)
SODIUM SERPL-SCNC: 134 MMOL/L (ref 136–145)
WBC # BLD AUTO: 5.4 X10*3/UL (ref 4.4–11.3)

## 2025-07-22 PROCEDURE — 36415 COLL VENOUS BLD VENIPUNCTURE: CPT | Performed by: NURSE PRACTITIONER

## 2025-07-22 PROCEDURE — 2500000005 HC RX 250 GENERAL PHARMACY W/O HCPCS: Performed by: NURSE PRACTITIONER

## 2025-07-22 PROCEDURE — 83735 ASSAY OF MAGNESIUM: CPT | Performed by: NURSE PRACTITIONER

## 2025-07-22 PROCEDURE — 2500000002 HC RX 250 W HCPCS SELF ADMINISTERED DRUGS (ALT 637 FOR MEDICARE OP, ALT 636 FOR OP/ED): Performed by: NURSE PRACTITIONER

## 2025-07-22 PROCEDURE — 80048 BASIC METABOLIC PNL TOTAL CA: CPT | Performed by: NURSE PRACTITIONER

## 2025-07-22 PROCEDURE — 97535 SELF CARE MNGMENT TRAINING: CPT | Mod: GO,CO

## 2025-07-22 PROCEDURE — 97530 THERAPEUTIC ACTIVITIES: CPT | Mod: GO,CO

## 2025-07-22 PROCEDURE — 2500000004 HC RX 250 GENERAL PHARMACY W/ HCPCS (ALT 636 FOR OP/ED): Performed by: NURSE PRACTITIONER

## 2025-07-22 PROCEDURE — 2500000001 HC RX 250 WO HCPCS SELF ADMINISTERED DRUGS (ALT 637 FOR MEDICARE OP): Performed by: NURSE PRACTITIONER

## 2025-07-22 PROCEDURE — S4991 NICOTINE PATCH NONLEGEND: HCPCS | Performed by: NURSE PRACTITIONER

## 2025-07-22 PROCEDURE — 85025 COMPLETE CBC W/AUTO DIFF WBC: CPT | Performed by: NURSE PRACTITIONER

## 2025-07-22 PROCEDURE — 1200000002 HC GENERAL ROOM WITH TELEMETRY DAILY

## 2025-07-22 PROCEDURE — 97161 PT EVAL LOW COMPLEX 20 MIN: CPT | Mod: GP

## 2025-07-22 PROCEDURE — 99233 SBSQ HOSP IP/OBS HIGH 50: CPT | Performed by: HOSPITALIST

## 2025-07-22 PROCEDURE — 2500000004 HC RX 250 GENERAL PHARMACY W/ HCPCS (ALT 636 FOR OP/ED): Performed by: HOSPITALIST

## 2025-07-22 PROCEDURE — 94640 AIRWAY INHALATION TREATMENT: CPT

## 2025-07-22 RX ADMIN — LISINOPRIL 40 MG: 10 TABLET ORAL at 08:14

## 2025-07-22 RX ADMIN — METHYLPREDNISOLONE SODIUM SUCCINATE 40 MG: 40 INJECTION, POWDER, FOR SOLUTION INTRAMUSCULAR; INTRAVENOUS at 13:09

## 2025-07-22 RX ADMIN — METHYLPREDNISOLONE SODIUM SUCCINATE 40 MG: 40 INJECTION, POWDER, FOR SOLUTION INTRAMUSCULAR; INTRAVENOUS at 21:24

## 2025-07-22 RX ADMIN — FLUTICASONE FUROATE AND VILANTEROL TRIFENATATE 1 PUFF: 200; 25 POWDER RESPIRATORY (INHALATION) at 06:25

## 2025-07-22 RX ADMIN — IPRATROPIUM BROMIDE AND ALBUTEROL SULFATE 3 ML: 2.5; .5 SOLUTION RESPIRATORY (INHALATION) at 20:21

## 2025-07-22 RX ADMIN — GUAIFENESIN 600 MG: 600 TABLET ORAL at 16:24

## 2025-07-22 RX ADMIN — PANTOPRAZOLE SODIUM 40 MG: 40 TABLET, DELAYED RELEASE ORAL at 06:25

## 2025-07-22 RX ADMIN — CITALOPRAM HYDROBROMIDE 20 MG: 20 TABLET ORAL at 08:14

## 2025-07-22 RX ADMIN — THIAMINE HYDROCHLORIDE 100 MG: 100 INJECTION, SOLUTION INTRAMUSCULAR; INTRAVENOUS at 08:14

## 2025-07-22 RX ADMIN — DOXYCYCLINE 100 MG: 100 INJECTION, POWDER, LYOPHILIZED, FOR SOLUTION INTRAVENOUS at 21:21

## 2025-07-22 RX ADMIN — LORAZEPAM 0.5 MG: 0.5 TABLET ORAL at 21:28

## 2025-07-22 RX ADMIN — IPRATROPIUM BROMIDE AND ALBUTEROL SULFATE 3 ML: 2.5; .5 SOLUTION RESPIRATORY (INHALATION) at 07:17

## 2025-07-22 RX ADMIN — METHYLPREDNISOLONE SODIUM SUCCINATE 40 MG: 40 INJECTION, POWDER, FOR SOLUTION INTRAMUSCULAR; INTRAVENOUS at 06:25

## 2025-07-22 RX ADMIN — AMLODIPINE BESYLATE 2.5 MG: 5 TABLET ORAL at 08:14

## 2025-07-22 RX ADMIN — IPRATROPIUM BROMIDE AND ALBUTEROL SULFATE 3 ML: 2.5; .5 SOLUTION RESPIRATORY (INHALATION) at 11:08

## 2025-07-22 RX ADMIN — TIOTROPIUM BROMIDE INHALATION SPRAY 2 PUFF: 3.12 SPRAY, METERED RESPIRATORY (INHALATION) at 06:25

## 2025-07-22 RX ADMIN — Medication 3 MG: at 21:28

## 2025-07-22 RX ADMIN — FOLIC ACID 1 MG: 1 TABLET ORAL at 08:14

## 2025-07-22 RX ADMIN — Medication 1 TABLET: at 08:14

## 2025-07-22 ASSESSMENT — LIFESTYLE VARIABLES
AUDITORY DISTURBANCES: NOT PRESENT
NAUSEA AND VOMITING: NO NAUSEA AND NO VOMITING
VISUAL DISTURBANCES: NOT PRESENT
PAROXYSMAL SWEATS: NO SWEAT VISIBLE
HEADACHE, FULLNESS IN HEAD: MILD
HEADACHE, FULLNESS IN HEAD: NOT PRESENT
ANXIETY: NO ANXIETY, AT EASE
ORIENTATION AND CLOUDING OF SENSORIUM: ORIENTED AND CAN DO SERIAL ADDITIONS
AGITATION: NORMAL ACTIVITY
NAUSEA AND VOMITING: NO NAUSEA AND NO VOMITING
NAUSEA AND VOMITING: NO NAUSEA AND NO VOMITING
TREMOR: NO TREMOR
HEADACHE, FULLNESS IN HEAD: NOT PRESENT
NAUSEA AND VOMITING: NO NAUSEA AND NO VOMITING
TOTAL SCORE: 7
TOTAL SCORE: 0
PAROXYSMAL SWEATS: NO SWEAT VISIBLE
AUDITORY DISTURBANCES: NOT PRESENT
PAROXYSMAL SWEATS: NO SWEAT VISIBLE
ANXIETY: NO ANXIETY, AT EASE
ANXIETY: NO ANXIETY, AT EASE
AGITATION: NORMAL ACTIVITY
BLOOD PRESSURE: 125/75
BLOOD PRESSURE: 154/84
TREMOR: NO TREMOR
TREMOR: NO TREMOR
ANXIETY: NO ANXIETY, AT EASE
AGITATION: NORMAL ACTIVITY
ORIENTATION AND CLOUDING OF SENSORIUM: ORIENTED AND CAN DO SERIAL ADDITIONS
HEADACHE, FULLNESS IN HEAD: NOT PRESENT
BLOOD PRESSURE: 150/85
ORIENTATION AND CLOUDING OF SENSORIUM: ORIENTED AND CAN DO SERIAL ADDITIONS
PAROXYSMAL SWEATS: NO SWEAT VISIBLE
VISUAL DISTURBANCES: NOT PRESENT
VISUAL DISTURBANCES: NOT PRESENT
TOTAL SCORE: 0
PAROXYSMAL SWEATS: NO SWEAT VISIBLE
BLOOD PRESSURE: 130/77
ORIENTATION AND CLOUDING OF SENSORIUM: ORIENTED AND CAN DO SERIAL ADDITIONS
TACTILE DISTURBANCES: VERY MILD ITCHING, PINS AND NEEDLES, BURNING OR NUMBNESS
ORIENTATION AND CLOUDING OF SENSORIUM: ORIENTED AND CAN DO SERIAL ADDITIONS
VISUAL DISTURBANCES: NOT PRESENT
ORIENTATION AND CLOUDING OF SENSORIUM: ORIENTED AND CAN DO SERIAL ADDITIONS
TOTAL SCORE: 0
TREMOR: NO TREMOR
TREMOR: NO TREMOR
NAUSEA AND VOMITING: NO NAUSEA AND NO VOMITING
VISUAL DISTURBANCES: NOT PRESENT
TOTAL SCORE: 0
AGITATION: NORMAL ACTIVITY
NAUSEA AND VOMITING: NO NAUSEA AND NO VOMITING
AUDITORY DISTURBANCES: NOT PRESENT
VISUAL DISTURBANCES: NOT PRESENT
ORIENTATION AND CLOUDING OF SENSORIUM: ORIENTED AND CAN DO SERIAL ADDITIONS
AGITATION: NORMAL ACTIVITY
ANXIETY: NO ANXIETY, AT EASE
AUDITORY DISTURBANCES: NOT PRESENT
TREMOR: NO TREMOR
TOTAL SCORE: 0
TOTAL SCORE: 0
PULSE: 80
VISUAL DISTURBANCES: NOT PRESENT
NAUSEA AND VOMITING: NO NAUSEA AND NO VOMITING
PULSE: 85
ANXIETY: NO ANXIETY, AT EASE
AGITATION: NORMAL ACTIVITY
TOTAL SCORE: 0
TREMOR: NO TREMOR
PAROXYSMAL SWEATS: NO SWEAT VISIBLE
ANXIETY: NO ANXIETY, AT EASE
AUDITORY DISTURBANCES: NOT PRESENT
ORIENTATION AND CLOUDING OF SENSORIUM: ORIENTED AND CAN DO SERIAL ADDITIONS
HEADACHE, FULLNESS IN HEAD: NOT PRESENT
NAUSEA AND VOMITING: NO NAUSEA AND NO VOMITING
AUDITORY DISTURBANCES: NOT PRESENT
AUDITORY DISTURBANCES: NOT PRESENT
NAUSEA AND VOMITING: NO NAUSEA AND NO VOMITING
ANXIETY: 3
AGITATION: NORMAL ACTIVITY
TREMOR: NO TREMOR
AUDITORY DISTURBANCES: NOT PRESENT
ORIENTATION AND CLOUDING OF SENSORIUM: ORIENTED AND CAN DO SERIAL ADDITIONS
PAROXYSMAL SWEATS: NO SWEAT VISIBLE
TREMOR: NO TREMOR
TREMOR: NO TREMOR
AGITATION: NORMAL ACTIVITY
PULSE: 83
PULSE: 85
NAUSEA AND VOMITING: NO NAUSEA AND NO VOMITING
PAROXYSMAL SWEATS: NO SWEAT VISIBLE
HEADACHE, FULLNESS IN HEAD: NOT PRESENT
HEADACHE, FULLNESS IN HEAD: NOT PRESENT
TOTAL SCORE: 0
AGITATION: SOMEWHAT MORE THAN NORMAL ACTIVITY
AUDITORY DISTURBANCES: NOT PRESENT
HEADACHE, FULLNESS IN HEAD: NOT PRESENT
AGITATION: NORMAL ACTIVITY
ANXIETY: NO ANXIETY, AT EASE
AUDITORY DISTURBANCES: NOT PRESENT
AUDITORY DISTURBANCES: NOT PRESENT
VISUAL DISTURBANCES: NOT PRESENT
ANXIETY: NO ANXIETY, AT EASE
VISUAL DISTURBANCES: NOT PRESENT
PAROXYSMAL SWEATS: NO SWEAT VISIBLE
TREMOR: NO TREMOR
TOTAL SCORE: 0
ORIENTATION AND CLOUDING OF SENSORIUM: ORIENTED AND CAN DO SERIAL ADDITIONS
PAROXYSMAL SWEATS: NO SWEAT VISIBLE
PAROXYSMAL SWEATS: NO SWEAT VISIBLE
HEADACHE, FULLNESS IN HEAD: NOT PRESENT
VISUAL DISTURBANCES: NOT PRESENT
NAUSEA AND VOMITING: NO NAUSEA AND NO VOMITING
VISUAL DISTURBANCES: NOT PRESENT
HEADACHE, FULLNESS IN HEAD: NOT PRESENT
HEADACHE, FULLNESS IN HEAD: NOT PRESENT
TOTAL SCORE: 0
ORIENTATION AND CLOUDING OF SENSORIUM: ORIENTED AND CAN DO SERIAL ADDITIONS
ANXIETY: NO ANXIETY, AT EASE
AGITATION: NORMAL ACTIVITY

## 2025-07-22 ASSESSMENT — COGNITIVE AND FUNCTIONAL STATUS - GENERAL
DRESSING REGULAR UPPER BODY CLOTHING: A LITTLE
DAILY ACTIVITIY SCORE: 15
MOVING TO AND FROM BED TO CHAIR: A LITTLE
TOILETING: A LOT
DRESSING REGULAR LOWER BODY CLOTHING: A LOT
HELP NEEDED FOR BATHING: A LOT
EATING MEALS: A LITTLE
MOVING FROM LYING ON BACK TO SITTING ON SIDE OF FLAT BED WITH BEDRAILS: A LITTLE
MOBILITY SCORE: 24
CLIMB 3 TO 5 STEPS WITH RAILING: TOTAL
DAILY ACTIVITIY SCORE: 24
WALKING IN HOSPITAL ROOM: A LOT
MOBILITY SCORE: 14
MOBILITY SCORE: 21
MOVING TO AND FROM BED TO CHAIR: A LOT
PERSONAL GROOMING: A LITTLE
CLIMB 3 TO 5 STEPS WITH RAILING: A LITTLE
TURNING FROM BACK TO SIDE WHILE IN FLAT BAD: A LITTLE
STANDING UP FROM CHAIR USING ARMS: A LITTLE
DAILY ACTIVITIY SCORE: 24
WALKING IN HOSPITAL ROOM: A LITTLE

## 2025-07-22 ASSESSMENT — PAIN - FUNCTIONAL ASSESSMENT
PAIN_FUNCTIONAL_ASSESSMENT: 0-10
PAIN_FUNCTIONAL_ASSESSMENT: 0-10

## 2025-07-22 ASSESSMENT — PAIN SCALES - GENERAL
PAINLEVEL_OUTOF10: 0 - NO PAIN
PAINLEVEL_OUTOF10: 0 - NO PAIN
PAINLEVEL_OUTOF10: 8
PAINLEVEL_OUTOF10: 0 - NO PAIN
PAINLEVEL_OUTOF10: 0 - NO PAIN

## 2025-07-22 ASSESSMENT — ACTIVITIES OF DAILY LIVING (ADL)
HOME_MANAGEMENT_TIME_ENTRY: 10
ADL_ASSISTANCE: INDEPENDENT

## 2025-07-22 NOTE — CARE PLAN
The patient's goals for the shift include remain stable    The clinical goals for the shift include Maintain patient's safety and manage patient's pain throughout the shift    Over the shift, the patient did make progress toward the following goals.       Problem: Pain  Goal: Takes deep breaths with improved pain control throughout the shift  Outcome: Progressing  Goal: Turns in bed with improved pain control throughout the shift  Outcome: Progressing  Goal: Walks with improved pain control throughout the shift  Outcome: Progressing  Goal: Performs ADL's with improved pain control throughout shift  Outcome: Progressing  Goal: Participates in PT with improved pain control throughout the shift  Outcome: Progressing  Goal: Free from opioid side effects throughout the shift  Outcome: Progressing  Goal: Free from acute confusion related to pain meds throughout the shift  Outcome: Progressing     Problem: Pain - Adult  Goal: Verbalizes/displays adequate comfort level or baseline comfort level  Outcome: Progressing  Flowsheets (Taken 7/22/2025 0300)  Verbalizes/displays adequate comfort level or baseline comfort level:   Encourage patient to monitor pain and request assistance   Assess pain using appropriate pain scale   Administer analgesics based on type and severity of pain and evaluate response   Implement non-pharmacological measures as appropriate and evaluate response     Problem: Safety - Adult  Goal: Free from fall injury  Outcome: Progressing  Flowsheets (Taken 7/22/2025 0300)  Free from fall injury:   Instruct family/caregiver on patient safety   Based on caregiver fall risk screen, instruct family/caregiver to ask for assistance with transferring infant if caregiver noted to have fall risk factors     Problem: Discharge Planning  Goal: Discharge to home or other facility with appropriate resources  Outcome: Progressing  Flowsheets (Taken 7/22/2025 0300)  Discharge to home or other facility with appropriate  resources:   Identify barriers to discharge with patient and caregiver   Arrange for needed discharge resources and transportation as appropriate   Identify discharge learning needs (meds, wound care, etc)   Arrange for interpreters to assist at discharge as needed   Refer to discharge planning if patient needs post-hospital services based on physician order or complex needs related to functional status, cognitive ability or social support system     Problem: Chronic Conditions and Co-morbidities  Goal: Patient's chronic conditions and co-morbidity symptoms are monitored and maintained or improved  Outcome: Progressing  Flowsheets (Taken 7/22/2025 0300)  Care Plan - Patient's Chronic Conditions and Co-Morbidity Symptoms are Monitored and Maintained or Improved:   Monitor and assess patient's chronic conditions and comorbid symptoms for stability, deterioration, or improvement   Collaborate with multidisciplinary team to address chronic and comorbid conditions and prevent exacerbation or deterioration   Update acute care plan with appropriate goals if chronic or comorbid symptoms are exacerbated and prevent overall improvement and discharge     Problem: Nutrition  Goal: Nutrient intake appropriate for maintaining nutritional needs  Outcome: Progressing     Problem: Fall/Injury  Goal: Not fall by end of shift  Outcome: Progressing  Goal: Be free from injury by end of the shift  Outcome: Progressing  Goal: Verbalize understanding of personal risk factors for fall in the hospital  Outcome: Progressing  Goal: Verbalize understanding of risk factor reduction measures to prevent injury from fall in the home  Outcome: Progressing  Goal: Use assistive devices by end of the shift  Outcome: Progressing  Goal: Pace activities to prevent fatigue by end of the shift  Outcome: Progressing

## 2025-07-22 NOTE — NURSING NOTE
Pt refusing nicotene patch, lovenox and miralax this morning. Pt educated and understands what each is for and the risk of not taking.

## 2025-07-22 NOTE — PROGRESS NOTES
Rosa Ch [id number]   Service: Internal Medicine / Hospitalist Date of service: 7/22/2025                          Full Code          Subjective      History Of Present Illness (HPI):  Rosa Ch is a 75 y.o. female with PMHx s/f COPD and emphysema, daily alcohol use, active tobacco dependence, hypertension, hyperlipidemia presenting with shortness of breath and syncopal episode yesterday.  Patient has been taking Trelegy for an inhaler believe she is having adverse reaction to this with increased mucus buildup.  Last night she had severe episode with intractable coughing was gasping for breath patient did have syncopal episode.  Patient has not been experiencing any fevers or chills no chest pain.  In the emergency department patient workup suggested hypoxia with COPD exacerbation.  Patient did get Solu-Medrol prior to arrival nebulizer treatments with symptomatic improvement.  Case was discussed with the ED provider plan is to admit patient for further workup and evaluation it is unclear if length of stay will need to exceed 2 midnights.     ED Course:   Vitals on presentation: T 36.7 °C (98.1 °F)  HR (!) 104  BP (!) 184/98  RR 14  O2 (!) 90 % None (Room air)  Labs:   CBC with WBC 8.3, Hgb 21.0, Plts 213.   CMP with glucose 89, Na 138, K 4.1, BUN 6, sCr 0.34, alk phos 71, ALT 20, AST 22, bilirubin 1.4. Magnesium No results found for requested labs within last 365 days..   BNP 46. Trop 4.   Lactate No results found for requested labs within last 365 days.  EKG: Sinus rhythm QTc 463 ms no ST segment to suggest acute ischemia  Imaging - agree with radiology interpretation(s):   Interventions: DuoNeb treatment, normal saline       Decision made to admit the patient to the hospitalist service after evaluation of the patient, review of the above, and discussion with ED provider.     7/22................[facility] rounding with patient's nurse at bedside.No reported: Seizure activities, syncope, presyncope,  dyspnea, fevers, chills.  The patient endorsed dyspnea but improved.      Review of Systems:   Review of system otherwise negative if not aforementioned above in subjective.    Objective    Physical Exam     Constitutional:       Appearance: Patient appeared in no acute cardiopulmonary distress.     Comments: Patient alert and oriented to person ,place ,time and situation.The patient was able to speak in full sentences with supplemental oxygen.  HEENT:      Head: Normocephalic and atraumatic.Trachea midline      Nose:No observed congestion or rhinorrhea.     Mouth/Throat: Mucous membranes Moist, Trachea appeared  midline.  Eyes:      Extraocular Movements: Extraocular movements intact.      Pupils: Pupils are equal, round, and reactive to light.      Comments: No scleral icterus or conjunctival injection appreciated.   Cardiovascular:      Rate and Rhythm: Normal rate and regular rhythm. No clicks rubs or gallops, normal S1 and S2.No peripheral stigmata of endocarditis appreciated.     Pulmonary:     Lung bases diminished, expiratory phase appears prolonged butno adventitious sound appreciated.  Abdominal:      General: Abdomen soft, nontender, active bowel sounds, no involuntary guarding or rebound tenderness appreciated.     Comments: None   Musculoskeletal:       Patient appeared to have full active range of motion for upper and lower extremities, no acute apparent joint deformity appreciated on examination.   No pitting edema or cyanosis appreciated.         Skin:     General: Skin is warm.      Coloration:  No jaundice     Findings: Left elbow ecchymosis  Neurological:      General: No focal sensory or motor deficits appreciated.     Cranial Nerves: Cranial nerves II to XII appearing grossly intact.     Genitals:  Deferred  Psychiatric:         Normal-appearing mood and affect    Labs:     Lab Results   Component Value Date    GLUCOSE 134 (H) 07/22/2025    CALCIUM 8.5 (L) 07/22/2025     (L) 07/22/2025     K 4.0 07/22/2025    CO2 32 07/22/2025    CL 98 07/22/2025    BUN 9 07/22/2025    CREATININE 0.27 (L) 07/22/2025      Lab Results   Component Value Date    WBC 5.4 07/22/2025    HGB 17.3 (H) 07/22/2025    HCT 52.3 (H) 07/22/2025     07/22/2025     07/22/2025      [unfilled]   [unfilled]   No results found for the last 90 days.              X-rays/ Images    [unfilled]   Radiology Results (last 21 days)    Procedure Component Value Units Date/Time   XR chest 2 views [[id number]] Collected: 07/21/25 0920   Order Status: Completed Updated: 07/21/25 0920   Narrative:     Interpreted By:  Schoenberger, Joseph,  STUDY:  XR CHEST 2 VIEWS;  7/21/2025 9:05 am      INDICATION:  Signs/Symptoms:SOB.          COMPARISON:  01/11/2023      ACCESSION NUMBER(S):  [id number]      ORDERING CLINICIAN:  JESSENIA BYRD      FINDINGS:                  CARDIOMEDIASTINAL SILHOUETTE:  Cardiomediastinal silhouette is normal in size and configuration.      LUNGS:  Right basal infiltrate has resolved. No new focal lung opacity. No  pleural effusion or pneumothorax. Coarsened central lung markings  unchanged.      ABDOMEN:  No remarkable upper abdominal findings.      BONES:  No acute osseous changes.       Impression:     1.  No definite acute findings. See discussion above              MACRO:  None      Signed by: Joseph Schoenberger 7/21/2025 9:19 AM  Dictation workstation:   [id number]   XR elbow left 3+ views [[id number]] Collected: 07/21/25 0922   Order Status: Completed Updated: 07/21/25 0922   Narrative:     Interpreted By:  Schoenberger, Joseph,  STUDY:  XR ELBOW LEFT 3+ VIEWS; ;  7/21/2025 9:05 am      INDICATION:  Signs/Symptoms:fall.          COMPARISON:  None.      ACCESSION NUMBER(S):  [id number]      ORDERING CLINICIAN:  JESSENIA BYRD      FINDINGS:  There is mild enthesopathy at the medial epicondylar origin of the  common flexor tendon. There is no fracture or dislocation. No  joint  effusion noted.       Impression:     No acute findings.          MACRO:  None      Signed by: Joseph Schoenberger 7/21/2025 9:21 AM  Dictation workstation:   [id number]             Medical Problems       Problem List       * (Principal) COPD exacerbation (Multi)    Anxiety    Benign essential hypertension    COPD (chronic obstructive pulmonary disease) (Multi)    Fracture of second toe, left, closed, initial encounter    Hoarseness    Hyperlipidemia    Pulmonary nodules    Toe injury, right, initial encounter    Parotiditis    Sepsis (Multi)    Respiratory failure with hypoxia    Hypoxia    Dyspnea    Overview Signed 1/23/2024 12:01 PM by Erika Carvalho MA   Comment on above: SOB         Bronchospasm    Community acquired pneumonia    Current smoker    Tobacco dependence due to cigarettes    Alcohol consumption of four to five drinks per day               Above medical problems may be reflective of historical medical problems that may have resolved and may not related to acute clinical condition/medical problems.    Clinical impression/plan:      75 y.o. female with PMHx s/f COPD and emphysema, daily alcohol use, active tobacco dependence, hypertension, hyperlipidemia presenting with shortness of breath and syncopal episode yesterday.     Acute hypoxic respiratory failure secondary to COPD exacerbation  Patient with significant emphysema by history has been on intermittent oxygen in the past  Substitute Breo and Spiriva for Trelegy patient thinks she had adverse reaction   check magnesium level and supplement if able   7/22...........Continued treatment for acute COPD exacerbation including systematic corticosteroids, moderate to severe COPD exacerbation, empiric antibiotics short course less than 5-7/implement doxycycline with increased sputum volume and increased dyspnea.       Syncope  Appears self-limited due to shortness of breath  Possibly related to alcohol use or coughing  Will check echocardiogram  to exclude LV dysfunction or structural abnormality   Will orthostatic vital signs to exclude ongoing orthostatic hypotension     7/22........... suspicion of hypoxia induced/related syncopal episode.Recent 2D echocardiogram reviewed ejection fraction preserved, no aortic valve stenosis reported.     Concern for adverse reaction to Trelegy  *The patient has increased mucous from trilogy, has tolerated Symbicort in past, consider discharge on  Symbicort and Spiriva to be given separately        History of alcohol use  Continue patient on CIWA protocol supplemental thiamine   7/22...........Monitor electrolytes including magnesium, continue CIWA protocol/symptoms triggered therapy, at risk for alcohol withdrawal.         History of tobacco dependence   7/22...........Cessation was advised.Nicotine replacement therapy ordered       Hypertension  We will reconcile patient's home medications     7/22...........Continue antihypertensive therapy, given Solu-Medrol use mindful of worsening hypertension, adjust therapy antihypertensive therapy as needed.    Disposition/additional care plan/interventions/discharge planning and barriers: 7/22         The patient was informed of differential diagnosis , work up , plan of care and possible sequelae of clinical disposition.Patient in agreement with plan of care. Further recommendations forthcoming in accordance with patient's clinical disposition and response to care.      Dictation performed with assistance of voice recognition device therefore transcription errors are possible.

## 2025-07-22 NOTE — CARE PLAN
Problem: Pain  Goal: Takes deep breaths with improved pain control throughout the shift  Outcome: Progressing  Goal: Turns in bed with improved pain control throughout the shift  Outcome: Progressing  Goal: Walks with improved pain control throughout the shift  Outcome: Progressing  Goal: Performs ADL's with improved pain control throughout shift  Outcome: Progressing  Goal: Participates in PT with improved pain control throughout the shift  Outcome: Progressing  Goal: Free from opioid side effects throughout the shift  Outcome: Progressing  Goal: Free from acute confusion related to pain meds throughout the shift  Outcome: Progressing     Problem: Pain - Adult  Goal: Verbalizes/displays adequate comfort level or baseline comfort level  Outcome: Progressing     Problem: Safety - Adult  Goal: Free from fall injury  Outcome: Progressing     Problem: Discharge Planning  Goal: Discharge to home or other facility with appropriate resources  Outcome: Progressing     Problem: Chronic Conditions and Co-morbidities  Goal: Patient's chronic conditions and co-morbidity symptoms are monitored and maintained or improved  Outcome: Progressing     Problem: Nutrition  Goal: Nutrient intake appropriate for maintaining nutritional needs  Outcome: Progressing     Problem: Fall/Injury  Goal: Not fall by end of shift  Outcome: Progressing  Goal: Be free from injury by end of the shift  Outcome: Progressing  Goal: Verbalize understanding of personal risk factors for fall in the hospital  Outcome: Progressing  Goal: Verbalize understanding of risk factor reduction measures to prevent injury from fall in the home  Outcome: Progressing  Goal: Use assistive devices by end of the shift  Outcome: Progressing  Goal: Pace activities to prevent fatigue by end of the shift  Outcome: Progressing   The patient's goals for the shift include remain stable    The clinical goals for the shift include pt will saturate appropriately this shift on  baeline o2 of 2 liters.

## 2025-07-22 NOTE — PROGRESS NOTES
07/22/25 1435   Discharge Planning   Living Arrangements Alone   Support Systems Family members;Friends/neighbors   Assistance Needed Home Health Care   Type of Residence Private residence   Home or Post Acute Services In home services   Type of Home Care Services Home nursing visits;Home OT;Home PT;Home health aide;Housekeeping   Expected Discharge Disposition Home H   Does the patient need discharge transport arranged? Yes   [facility] coordination needed? Yes   Patient Choice   Provider Choice list and CMS website (https://medicare.gov/care-compare#search) for post-acute Quality and Resource Measure Data were provided and reviewed with: Patient   Intensity of Service   Intensity of Service 0-30 min     Met with patient at bedside, introduced self and role as RN TCC. Patient lives at home alone. She just sold her 3 bedroom condo because she could no longer do the stairs and laundry and downsized to an apartment in [place]. Patient has moved about 2 weeks ago. She has started becoming more SOB, h/o COPD, uses Inogen PRN at home. She was 70% at home when squad arrived . She is currently on 3-4L NC, likely will need Home O2 eval for home going. She is open to that. PT OT recommend SNF per notes, she refuses, she is open to home care. [facility] list was printed, [facility] is choice. Will have Dr Rivera place orders when she is medically ready. TCC to follow for discharge planning.

## 2025-07-22 NOTE — PROGRESS NOTES
Physical Therapy    Physical Therapy Evaluation    Patient Name: Rosa Ch  MRN: [id number]  Today's Date: 7/22/2025   Time Calculation  Start Time: 1040  Stop Time: 1108  Time Calculation (min): 28 min  3303/3303-A    Assessment/Plan   PT Assessment  PT Assessment Results: Decreased endurance, Decreased mobility, Pain  Rehab Prognosis: Good  Barriers to Discharge Home: Caregiver assistance, Physical needs  Caregiver Assistance: Patient lives alone and/or does not have reliable caregiver assistance  Physical Needs: Stair navigation into home limited by function/safety, 24hr mobility assistance needed, Intermittent ADL assistance needed  Evaluation/Treatment Tolerance: Patient limited by fatigue (And O2 desaturation)  Medical Staff Made Aware: Yes  End of Session Communication: Bedside nurse (Respiratory therapist)    Assessment Comment: Patient presents with decreased endurance, O2 desaturation with mobility to 81% on 3LO2; +SOB. Required MIN A of 1 +1 for safety and line management for transfer and ambulation 30 feet with WW. Recommend continued PT with referral for MODERATE INTENSITY rehab. Based on current functional status and rehab potential, patient is anticipated to tolerate and benefit from 5 or more days per week of skilled rehabilitative therapy after discharge from this acute inpatient hospitalization.     End of Session Patient Position: Up in chair, Alarm on  IP OR SWING BED PT PLAN  Inpatient or Swing Bed: Inpatient     07/22/25 1040   PT Plan   Treatment/Interventions Bed mobility;Transfer training;Gait training;Strengthening;Endurance training;Therapeutic exercise;Therapeutic activity   PT Plan Ongoing PT   PT Frequency 4 times per week   PT Discharge Recommendations   (For this acute hospital stay)   Equipment Recommended upon Discharge   (May benefit from a WW (none at home).)   PT Recommended Transfer Status Assist x2   PT - OK to Discharge Yes  (To next level of care once medically cleared.)        Subjective     Current Problem:  1. COPD exacerbation (Multi)        2. Hypoxia        3. Acute respiratory failure with hypoxia  Transthoracic Echo Complete    Transthoracic Echo Complete      4. Syncope and collapse  Transthoracic Echo Complete    Transthoracic Echo Complete        Problem List[1]    General Visit Information:  General  Reason for Referral: COPD exacerbation, hypoxia. Impaired mobility.  Referred By: KWAKU Alvarez-CNP  Past Medical History Relevant to Rehab: 74 y/o female admitted with COPD exacerbation, hypoxia, post syncope from coughing (landed on L elbow and hip, reports bruising and discomfort). Xray (-) fx or dislocation L elbow. (PMHx:  COPD and emphysema, daily alcohol use, active tobacco dependence, hypertension, hyperlipidemia)  Family/Caregiver Present: No  Co-Treatment: OT  Co-Treatment Reason: To conserve energy with consideration of current medical status.  Prior to Session Communication: Bedside nurse  Patient Position Received: Bed, 2 rail up, Alarm on  General Comment: Patient alert, agreeable to participate in PT.    Home Living:  Home Living  Type of Home: Apartment (Pt reports large apt - 2500 sq feet.)  Lives With: Alone  Home Adaptive Equipment: None  Home Layout: One level  Home Access: Stairs to enter with rails  Entrance Stairs-Rails: Both  Entrance Stairs-Number of Steps: 2  Bathroom Shower/Tub: Tub/shower unit  Home Living Comments: 2-3LO2.    Prior Level of Function:  Prior Function Per Pt/Caregiver Report  Level of [place]: Independent with ADLs and functional transfers, Needs assistance with homemaking  Receives Help From: Friends, Family (Hires out housekeeping and laundry. Shops with a friend. Reports sisters are local; sons are in [facility].)  ADL Assistance: Independent  Homemaking Assistance: Needs assistance  Ambulatory Assistance: Independent (No AD)  Prior Function Comments: +1 fall     +drives    Precautions:  Precautions  Precautions Comment: Fall and CIWA precautions. Monitor PSO2 with activity.    Vital Signs:  Vital Signs  Vitals Session: During PT  Heart Rate: 84  SpO2: (!) 81 % (After ambulation on 3LO2. Increased to 87-90% with seated rest and VC for pursed lip breathing.  Notified respiratory therapist coming in to provide tx after therapy session.)  Objective     Pain:  Pain Assessment  Pain Assessment: 0-10  0-10 (Numeric) Pain Score: 8  Pain Type: Acute pain  Pain Location: Elbow  Pain Orientation: Left  Pain Interventions: Ambulation/increased activity, Repositioned (Educated pt in  potential benefit of ice to L elbow.)  Response to Interventions: Resting quietly    Cognition:  Cognition  Overall Cognitive Status: Within Functional Limits  Safety/Judgement: Within Functional Limits  Insight: Mild    General Assessments:  General Observation  General Observation: Educated pt in pursed lip breathing with tendency to mouth breathe; recommended and facilitated optimal position of O2 tubing in nose.   Activity Tolerance  Endurance: Tolerates less than 10 min exercise with changes in vital signs, Decreased tolerance for upright activites  Rate of Perceived Exertion (RPE): Modified ARIAN RPE 7/10.  Sensation  Light Touch: No apparent deficits  Strength  Strength Comments: B LE WFL.     Coordination  Movements are Fluid and Coordinated: Yes     Static Sitting Balance  Static Sitting-Comment/Number of Minutes: SBA  Static Standing Balance  Static Standing-Comment/Number of Minutes: MIN A at WW.    Functional Assessments:     Bed Mobility  Bed Mobility:  (Supine> sit CGA with HOB elevated.)  Transfers  Transfer:  (Sit  <> stand MIN A of 1. VC for safe hand placement.)  Ambulation/Gait Training  Ambulation/Gait Training Performed:  (Ambulated 30 feet with WW and MIN of 1 and SBA of 1 for safety and line management.)  Stairs  Stairs: No (O2 desaturation with mobility.)       Extremity/Trunk  Assessments:        RLE   RLE : Within Functional Limits  LLE   LLE : Within Functional Limits    Outcome Measures:     [facility] Basic Mobility  Turning from your back to your side while in a flat bed without using bedrails: A little  Moving from lying on your back to sitting on the side of a flat bed without using bedrails: A little  Moving to and from bed to chair (including a wheelchair): A lot  Standing up from a chair using your arms (e.g. wheelchair or bedside chair): A little  To walk in hospital room: A lot  Climbing 3-5 steps with railing: Total  Basic Mobility - Total Score: 14                                                             Goals:  Encounter Problems       Encounter Problems (Active)       To improve balance, activity tolerance, overall mobility:        Patient will complete bed mobility with SBA; sit <> stand with CGA.       Start:  07/22/25    Expected End:  08/05/25            Patient will ambulate 50 feet with/out WW as needed with PSO2 WFL, CGA.       Start:  07/22/25    Expected End:  08/05/25            Patient will participate in dynamic stand balance activities for 3 minutes with 0-1 UE support with CGA, PSO2 WFL.        Start:  07/22/25    Expected End:  08/05/25                 Education Documentation  Mobility Training, taught by Jackie Ortiz PT at 7/22/2025  1:59 PM.  Learner: Patient  Readiness: Acceptance  Method: Explanation  Response: Verbalizes Understanding  Comment: Educated pt in pursed lip breathing techniques; role of PT.    Education Comments  No comments found.                   [1]   Patient Active Problem List  Diagnosis    Anxiety    Benign essential hypertension    COPD (chronic obstructive pulmonary disease) (Multi)    Fracture of second toe, left, closed, initial encounter    Hoarseness    Hyperlipidemia    Pulmonary nodules    Toe injury, right, initial encounter    Parotiditis    Sepsis (Multi)    Respiratory failure with hypoxia    Hypoxia    Dyspnea     Bronchospasm    Community acquired pneumonia    Current smoker    Tobacco dependence due to cigarettes    Alcohol consumption of four to five drinks per day    COPD exacerbation (Multi)

## 2025-07-22 NOTE — NURSING NOTE
Pt remained stable this shift. Pt weened to home 2 liters. Pt maintains NSR on telemetry. CIWAs remain negative. Bruising remains on L elbow. Likely to discharge on Thursday at the earliest. Rounding completed with Dr. Rivera this shift. IV intact. PRN Mucinex given.

## 2025-07-22 NOTE — PROGRESS NOTES
Occupational Therapy    OT Treatment    Patient Name: Rosa Ch  MRN: [id number]  Department: [facility] 3 E  Room: [address] [place] [address]  Today's Date: 7/22/2025  Time Calculation  Start Time: 1047  Stop Time: 1111  Time Calculation (min): 24 min        Assessment:  OT Assessment: pt. is MIN A x1 + another for safety and line management. She short of breath and needs rest periods to recover. Pt. tearful re: being able to perform IADLS at home . pt can benefit from continued skilled OT to increase safety and self independence  Barriers to Discharge Home: Physical needs  End of Session Communication:  (RT updated patient shortness of breath and spo2 sat decreased)  End of Session Patient Position: Bed, 3 rail up, Alarm on     Plan:  Treatment Interventions: ADL retraining, Functional transfer training, Endurance training, Compensatory technique education  OT Frequency: 4 times per week (for this acute hospital stay)  OT Discharge Recommendations: Moderate intensity level of continued care (Pending progress. Based on current functional status and rehab potential, patient is anticipated to tolerate and benefit from 5 or more days per week of skilled rehabilitative therapy after discharge from this acute inpatient hospitalization.)  Equipment Recommended upon Discharge:  (tub bench)  OT Recommended Transfer Status: Minimal assist (+ another for safety)  OT - OK to Discharge: Yes  Treatment Interventions: ADL retraining, Functional transfer training, Endurance training, Compensatory technique education    Subjective   OT Visit Info:  OT Received On: 07/22/25  General Visit Info:  General  Prior to Session Communication: Bedside nurse  Patient Position Received: Bed, 3 rail up, Alarm on  General Comment: pt. agreeable to OT needs  multiple rest periods due to shortness of breath spo2 sats reduced  87 % most of the time with activity. Pt. cued for breathing  Precautions:        Date/Time Vitals Session Patient Position Pulse Resp SpO2  BP MAP (mmHg)    07/22/25 1209 --  --  90  17  90 %  126/65  85      Vital Signs Comment:  (Pt in bed prior to TX, SpO2 86%, HR 98, sitting EOB SpO2 84%, , insturcted in inhalationn through nose and pursed lip exhale.)     Pain:  Pain Assessment  0-10 (Numeric) Pain Score: 0 - No pain    Objective    Cognition:  Cognition  Overall Cognitive Status: Within Functional Limits  Coordination:   Activities of Daily Living:      Grooming  Grooming Level of Assistance: Setup  Grooming Where Assessed: Edge of bed  Grooming Comments: brush hair    LE Dressing  LE Dressing: Yes  Sock Level of Assistance: Maximum assistance  LE Dressing Where Assessed: Edge of bed  LE Dressing Comments: pt. reported she does not wear socks wears sandals at home       Bed Mobility/Transfers: Bed Mobility  Bed Mobility: Yes  Bed Mobility 1  Bed Mobility 1: Supine to sitting  Level of Assistance 1: Contact guard    Transfers  Transfer: Yes  Transfer 1  Transfer From 1: Bed to  Transfer to 1: Chair with arms  Technique 1: Sit to stand, Stand to sit  Transfer Device 1: Walker  Transfer Level of Assistance 1: Minimum assistance, +1 to manage equipment (`managing o2 tubing)         Therapy/Activity: Therapeutic Activity  Therapeutic Activity Performed: Yes  Therapeutic Activity 1: Education on EC/WS this date pt. verbalized a good understanding.    Outcome Measures:[facility] Daily Activity  Putting on and taking off regular lower body clothing: A lot  Bathing (including washing, rinsing, drying): A lot  Putting on and taking off regular upper body clothing: A little  Toileting, which includes using toilet, bedpan or urinal: A lot  Taking care of personal grooming such as brushing teeth: A little  Eating Meals: A little  Daily Activity - Total Score: 15        Education Documentation  Body Mechanics, taught by JESSICA Stone at 7/22/2025 12:49 PM.  Learner: Patient  Readiness: Acceptance  Method: Explanation  Response: Verbalizes  Understanding, Needs Reinforcement  Comment: breathing education, EC    ADL Training, taught by JESSICA Stone at 7/22/2025 12:49 PM.  Learner: Patient  Readiness: Acceptance  Method: Explanation  Response: Verbalizes Understanding, Needs Reinforcement  Comment: breathing education, EC    Precautions, taught by JESSICA Stone at 7/22/2025 12:49 PM.  Learner: Patient  Readiness: Acceptance  Method: Explanation  Response: Verbalizes Understanding, Needs Reinforcement  Comment: breathing education, EC    Education Comments  No comments found.        OP EDUCATION:       Goals:  Encounter Problems       Encounter Problems (Active)       ADLs       Patient with complete LB dressing with SBA donning and doffing all LE clothes  with PRN adaptive equipment while supported sitting demonstrating energy conservation techniques. (Progressing)       Start:  07/21/25    Expected End:  08/04/25            Patient will complete toileting including hygiene & clothing management with SBA at bathroom level. (Progressing)       Start:  07/21/25    Expected End:  08/04/25               MOBILITY       Patient will perform Functional mobility mod Household distances with SBA and least restrictive device in order to improve safety and functional mobility. (Progressing)       Start:  07/21/25    Expected End:  08/04/25               TRANSFERS       Patient will complete functional transfers with least restrictive device with Mod I for improved safety. (Progressing)       Start:  07/21/25    Expected End:  08/04/25

## 2025-07-23 LAB
ANION GAP SERPL CALC-SCNC: 9 MMOL/L (ref 10–20)
BUN SERPL-MCNC: 12 MG/DL (ref 6–23)
CALCIUM SERPL-MCNC: 8.4 MG/DL (ref 8.6–10.3)
CHLORIDE SERPL-SCNC: 97 MMOL/L (ref 98–107)
CO2 SERPL-SCNC: 33 MMOL/L (ref 21–32)
CREAT SERPL-MCNC: 0.26 MG/DL (ref 0.5–1.05)
EGFRCR SERPLBLD CKD-EPI 2021: >90 ML/MIN/1.73M*2
GLUCOSE SERPL-MCNC: 138 MG/DL (ref 74–99)
POTASSIUM SERPL-SCNC: 3.8 MMOL/L (ref 3.5–5.3)
SODIUM SERPL-SCNC: 135 MMOL/L (ref 136–145)

## 2025-07-23 PROCEDURE — 94640 AIRWAY INHALATION TREATMENT: CPT

## 2025-07-23 PROCEDURE — 97530 THERAPEUTIC ACTIVITIES: CPT | Mod: GO,CO

## 2025-07-23 PROCEDURE — 36415 COLL VENOUS BLD VENIPUNCTURE: CPT | Performed by: HOSPITALIST

## 2025-07-23 PROCEDURE — 80048 BASIC METABOLIC PNL TOTAL CA: CPT | Performed by: HOSPITALIST

## 2025-07-23 PROCEDURE — 2500000005 HC RX 250 GENERAL PHARMACY W/O HCPCS: Performed by: NURSE PRACTITIONER

## 2025-07-23 PROCEDURE — 2500000004 HC RX 250 GENERAL PHARMACY W/ HCPCS (ALT 636 FOR OP/ED): Mod: JZ | Performed by: NURSE PRACTITIONER

## 2025-07-23 PROCEDURE — 2500000004 HC RX 250 GENERAL PHARMACY W/ HCPCS (ALT 636 FOR OP/ED): Performed by: HOSPITALIST

## 2025-07-23 PROCEDURE — 2500000001 HC RX 250 WO HCPCS SELF ADMINISTERED DRUGS (ALT 637 FOR MEDICARE OP): Performed by: NURSE PRACTITIONER

## 2025-07-23 PROCEDURE — 97116 GAIT TRAINING THERAPY: CPT | Mod: GP,CQ

## 2025-07-23 PROCEDURE — 1200000002 HC GENERAL ROOM WITH TELEMETRY DAILY

## 2025-07-23 PROCEDURE — 97110 THERAPEUTIC EXERCISES: CPT | Mod: GP,CQ

## 2025-07-23 PROCEDURE — 99233 SBSQ HOSP IP/OBS HIGH 50: CPT | Performed by: HOSPITALIST

## 2025-07-23 PROCEDURE — 2500000002 HC RX 250 W HCPCS SELF ADMINISTERED DRUGS (ALT 637 FOR MEDICARE OP, ALT 636 FOR OP/ED): Performed by: NURSE PRACTITIONER

## 2025-07-23 RX ADMIN — PANTOPRAZOLE SODIUM 40 MG: 40 INJECTION, POWDER, FOR SOLUTION INTRAVENOUS at 05:48

## 2025-07-23 RX ADMIN — FLUTICASONE FUROATE AND VILANTEROL TRIFENATATE 1 PUFF: 200; 25 POWDER RESPIRATORY (INHALATION) at 09:26

## 2025-07-23 RX ADMIN — TIOTROPIUM BROMIDE INHALATION SPRAY 2 PUFF: 3.12 SPRAY, METERED RESPIRATORY (INHALATION) at 09:26

## 2025-07-23 RX ADMIN — IPRATROPIUM BROMIDE AND ALBUTEROL SULFATE 3 ML: 2.5; .5 SOLUTION RESPIRATORY (INHALATION) at 07:33

## 2025-07-23 RX ADMIN — LORAZEPAM 0.5 MG: 0.5 TABLET ORAL at 21:12

## 2025-07-23 RX ADMIN — METHYLPREDNISOLONE SODIUM SUCCINATE 20 MG: 40 INJECTION, POWDER, FOR SOLUTION INTRAMUSCULAR; INTRAVENOUS at 21:12

## 2025-07-23 RX ADMIN — METHYLPREDNISOLONE SODIUM SUCCINATE 40 MG: 40 INJECTION, POWDER, FOR SOLUTION INTRAMUSCULAR; INTRAVENOUS at 14:16

## 2025-07-23 RX ADMIN — DOXYCYCLINE 100 MG: 100 INJECTION, POWDER, LYOPHILIZED, FOR SOLUTION INTRAVENOUS at 21:12

## 2025-07-23 RX ADMIN — METHYLPREDNISOLONE SODIUM SUCCINATE 40 MG: 40 INJECTION, POWDER, FOR SOLUTION INTRAMUSCULAR; INTRAVENOUS at 05:48

## 2025-07-23 RX ADMIN — LISINOPRIL 40 MG: 10 TABLET ORAL at 09:08

## 2025-07-23 RX ADMIN — CITALOPRAM HYDROBROMIDE 20 MG: 20 TABLET ORAL at 09:08

## 2025-07-23 RX ADMIN — Medication 3 MG: at 21:12

## 2025-07-23 RX ADMIN — THIAMINE HYDROCHLORIDE 100 MG: 100 INJECTION, SOLUTION INTRAMUSCULAR; INTRAVENOUS at 09:09

## 2025-07-23 RX ADMIN — AMLODIPINE BESYLATE 2.5 MG: 5 TABLET ORAL at 09:08

## 2025-07-23 RX ADMIN — DOXYCYCLINE 100 MG: 100 INJECTION, POWDER, LYOPHILIZED, FOR SOLUTION INTRAVENOUS at 09:09

## 2025-07-23 RX ADMIN — Medication 1 TABLET: at 09:08

## 2025-07-23 RX ADMIN — ACETAMINOPHEN 650 MG: 325 TABLET ORAL at 09:38

## 2025-07-23 RX ADMIN — FOLIC ACID 1 MG: 1 TABLET ORAL at 09:08

## 2025-07-23 RX ADMIN — IPRATROPIUM BROMIDE AND ALBUTEROL SULFATE 3 ML: 2.5; .5 SOLUTION RESPIRATORY (INHALATION) at 11:04

## 2025-07-23 RX ADMIN — IPRATROPIUM BROMIDE AND ALBUTEROL SULFATE 3 ML: 2.5; .5 SOLUTION RESPIRATORY (INHALATION) at 20:36

## 2025-07-23 ASSESSMENT — LIFESTYLE VARIABLES
ORIENTATION AND CLOUDING OF SENSORIUM: ORIENTED AND CAN DO SERIAL ADDITIONS
HEADACHE, FULLNESS IN HEAD: NOT PRESENT
ORIENTATION AND CLOUDING OF SENSORIUM: ORIENTED AND CAN DO SERIAL ADDITIONS
TOTAL SCORE: 6
NAUSEA AND VOMITING: MILD NAUSEA WITH NO VOMITING
ANXIETY: MODERATELY ANXIOUS, OR GUARDED, SO ANXIETY IS INFERRED
AGITATION: NORMAL ACTIVITY
TOTAL SCORE: 4
PAROXYSMAL SWEATS: NO SWEAT VISIBLE
ANXIETY: MILDLY ANXIOUS
ORIENTATION AND CLOUDING OF SENSORIUM: ORIENTED AND CAN DO SERIAL ADDITIONS
PAROXYSMAL SWEATS: NO SWEAT VISIBLE
TOTAL SCORE: 2
NAUSEA AND VOMITING: NO NAUSEA AND NO VOMITING
NAUSEA AND VOMITING: NO NAUSEA AND NO VOMITING
AUDITORY DISTURBANCES: NOT PRESENT
AGITATION: NORMAL ACTIVITY
HEADACHE, FULLNESS IN HEAD: VERY MILD
TREMOR: NO TREMOR
HEADACHE, FULLNESS IN HEAD: NOT PRESENT
VISUAL DISTURBANCES: NOT PRESENT
AGITATION: NORMAL ACTIVITY
NAUSEA AND VOMITING: NO NAUSEA AND NO VOMITING
PAROXYSMAL SWEATS: NO SWEAT VISIBLE
TREMOR: NOT VISIBLE, BUT CAN BE FELT FINGERTIP TO FINGERTIP
AUDITORY DISTURBANCES: NOT PRESENT
TREMOR: NOT VISIBLE, BUT CAN BE FELT FINGERTIP TO FINGERTIP
ORIENTATION AND CLOUDING OF SENSORIUM: ORIENTED AND CAN DO SERIAL ADDITIONS
VISUAL DISTURBANCES: NOT PRESENT
AUDITORY DISTURBANCES: NOT PRESENT
VISUAL DISTURBANCES: NOT PRESENT
ANXIETY: MILDLY ANXIOUS
ANXIETY: 2
PAROXYSMAL SWEATS: NO SWEAT VISIBLE
VISUAL DISTURBANCES: NOT PRESENT
TOTAL SCORE: 1
HEADACHE, FULLNESS IN HEAD: VERY MILD
TREMOR: NO TREMOR
AGITATION: NORMAL ACTIVITY
AUDITORY DISTURBANCES: NOT PRESENT

## 2025-07-23 ASSESSMENT — COGNITIVE AND FUNCTIONAL STATUS - GENERAL
DRESSING REGULAR UPPER BODY CLOTHING: A LITTLE
TOILETING: A LOT
HELP NEEDED FOR BATHING: A LITTLE
MOBILITY SCORE: 20
HELP NEEDED FOR BATHING: A LITTLE
STANDING UP FROM CHAIR USING ARMS: A LITTLE
MOVING FROM LYING ON BACK TO SITTING ON SIDE OF FLAT BED WITH BEDRAILS: A LITTLE
WALKING IN HOSPITAL ROOM: A LITTLE
DRESSING REGULAR LOWER BODY CLOTHING: A LITTLE
DAILY ACTIVITIY SCORE: 16
HELP NEEDED FOR BATHING: A LOT
MOVING TO AND FROM BED TO CHAIR: A LITTLE
MOBILITY SCORE: 16
MOBILITY SCORE: 21
DAILY ACTIVITIY SCORE: 21
DAILY ACTIVITIY SCORE: 21
TOILETING: A LITTLE
CLIMB 3 TO 5 STEPS WITH RAILING: TOTAL
CLIMB 3 TO 5 STEPS WITH RAILING: A LITTLE
EATING MEALS: A LITTLE
DRESSING REGULAR LOWER BODY CLOTHING: A LITTLE
WALKING IN HOSPITAL ROOM: A LITTLE
DRESSING REGULAR LOWER BODY CLOTHING: A LITTLE
TOILETING: A LITTLE
PERSONAL GROOMING: A LITTLE
WALKING IN HOSPITAL ROOM: A LITTLE
MOVING TO AND FROM BED TO CHAIR: A LITTLE
MOVING TO AND FROM BED TO CHAIR: A LITTLE
TURNING FROM BACK TO SIDE WHILE IN FLAT BAD: A LITTLE
CLIMB 3 TO 5 STEPS WITH RAILING: A LOT

## 2025-07-23 ASSESSMENT — PAIN - FUNCTIONAL ASSESSMENT
PAIN_FUNCTIONAL_ASSESSMENT: 0-10

## 2025-07-23 ASSESSMENT — PAIN SCALES - GENERAL
PAINLEVEL_OUTOF10: 0 - NO PAIN
PAINLEVEL_OUTOF10: 5 - MODERATE PAIN
PAINLEVEL_OUTOF10: 0 - NO PAIN

## 2025-07-23 NOTE — CARE PLAN
Problem: Pain  Goal: Takes deep breaths with improved pain control throughout the shift  Outcome: Progressing  Goal: Turns in bed with improved pain control throughout the shift  Outcome: Progressing  Goal: Walks with improved pain control throughout the shift  Outcome: Progressing  Goal: Performs ADL's with improved pain control throughout shift  Outcome: Progressing  Goal: Participates in PT with improved pain control throughout the shift  Outcome: Progressing  Goal: Free from opioid side effects throughout the shift  Outcome: Progressing  Goal: Free from acute confusion related to pain meds throughout the shift  Outcome: Progressing     Problem: Pain - Adult  Goal: Verbalizes/displays adequate comfort level or baseline comfort level  Outcome: Progressing     Problem: Safety - Adult  Goal: Free from fall injury  Outcome: Progressing     Problem: Discharge Planning  Goal: Discharge to home or other facility with appropriate resources  Outcome: Progressing     Problem: Chronic Conditions and Co-morbidities  Goal: Patient's chronic conditions and co-morbidity symptoms are monitored and maintained or improved  Outcome: Progressing     Problem: Nutrition  Goal: Nutrient intake appropriate for maintaining nutritional needs  Outcome: Progressing     Problem: Fall/Injury  Goal: Not fall by end of shift  Outcome: Progressing  Goal: Be free from injury by end of the shift  Outcome: Progressing  Goal: Verbalize understanding of personal risk factors for fall in the hospital  Outcome: Progressing  Goal: Verbalize understanding of risk factor reduction measures to prevent injury from fall in the home  Outcome: Progressing  Goal: Use assistive devices by end of the shift  Outcome: Progressing  Goal: Pace activities to prevent fatigue by end of the shift  Outcome: Progressing   The patient's goals for the shift include Patient remain safe and comfortable    The clinical goals for the shift include pt will saturate  appropriately this shift on baeline o2 of 2 liters.

## 2025-07-23 NOTE — PROGRESS NOTES
Rosa Ch [id number]   Service: Internal Medicine / Hospitalist Date of service: 7/23/2025                                  Full Code            Subjective       History Of Present Illness (HPI):  Rosa Ch is a 75 y.o. female with PMHx s/f COPD and emphysema, daily alcohol use, active tobacco dependence, hypertension, hyperlipidemia presenting with shortness of breath and syncopal episode yesterday.  Patient has been taking Trelegy for an inhaler believe she is having adverse reaction to this with increased mucus buildup.  Last night she had severe episode with intractable coughing was gasping for breath patient did have syncopal episode.  Patient has not been experiencing any fevers or chills no chest pain.  In the emergency department patient workup suggested hypoxia with COPD exacerbation.  Patient did get Solu-Medrol prior to arrival nebulizer treatments with symptomatic improvement.  Case was discussed with the ED provider plan is to admit patient for further workup and evaluation it is unclear if length of stay will need to exceed 2 midnights.     ED Course:   Vitals on presentation: T 36.7 °C (98.1 °F)  HR (!) 104  BP (!) 184/98  RR 14  O2 (!) 90 % None (Room air)  Labs:   CBC with WBC 8.3, Hgb 21.0, Plts 213.   CMP with glucose 89, Na 138, K 4.1, BUN 6, sCr 0.34, alk phos 71, ALT 20, AST 22, bilirubin 1.4. Magnesium No results found for requested labs within last 365 days..   BNP 46. Trop 4.   Lactate No results found for requested labs within last 365 days.  EKG: Sinus rhythm QTc 463 ms no ST segment to suggest acute ischemia  Imaging - agree with radiology interpretation(s):   Interventions: DuoNeb treatment, normal saline        Decision made to admit the patient to the hospitalist service after evaluation of the patient, review of the above, and discussion with ED provider.      7/22..................[facility] rounding with patient's nurse at bedside.No reported: Seizure activities, syncope,  presyncope, dyspnea, fevers, chills.  The patient endorsed dyspnea but improved.      7/23.............[facility] rounding with patient's nurse at bedside.7/22.............. patient's nurse reported patient was a bit tearful this morning.  No reported suicidal ideation, homicidal ideation or depression.  Patient stated that she was just worried about her home.  Overall patient felt that she had improved in comparison to last 24 hours.No reported chest pains, dyspnea at rest, headaches, nausea, vomiting, fevers or chills.  Throughout the day we have been able to titrate patient down from 5 to 2 L of oxygen       Review of Systems:   Review of system otherwise negative if not aforementioned above in subjective.     Objective     Physical Exam      Constitutional:       Appearance: Patient appeared in no acute cardiopulmonary distress.     Comments: Patient alert and oriented to person ,place ,time and situation.The patient was able to speak in full sentences with supplemental oxygen.  HEENT:      Head: Normocephalic and atraumatic.Trachea midline      Nose:No observed congestion or rhinorrhea.     Mouth/Throat: Mucous membranes Moist, Trachea appeared  midline.  Eyes:      Extraocular Movements: Extraocular movements intact.      Pupils: Pupils are equal, round, and reactive to light.      Comments: No scleral icterus or conjunctival injection appreciated.   Cardiovascular:      Rate and Rhythm: Normal rate and regular rhythm. No clicks rubs or gallops, normal S1 and S2.No peripheral stigmata of endocarditis appreciated.     Pulmonary:     Lung bases  still diminished no adventitious sound appreciated.  Abdominal:      General: Abdomen soft, nontender, active bowel sounds, no involuntary guarding or rebound tenderness appreciated.     Comments: None   Musculoskeletal:       Patient appeared to have full active range of motion for upper and lower extremities, no acute apparent joint deformity appreciated on examination.    No pitting edema or cyanosis appreciated.      Skin:     General: Skin is warm.      Coloration:  No jaundice     Findings: Left elbow ecchymosis  Neurological:      General: No focal sensory or motor deficits appreciated.     Cranial Nerves: Cranial nerves II to XII appearing grossly intact.     Genitals:  Deferred  Psychiatric:         Normal-appearing mood and affect     Labs:           Lab Results   Component Value Date     GLUCOSE 134 (H) 07/22/2025     CALCIUM 8.5 (L) 07/22/2025      (L) 07/22/2025     K 4.0 07/22/2025     CO2 32 07/22/2025     CL 98 07/22/2025     BUN 9 07/22/2025     CREATININE 0.27 (L) 07/22/2025       Lab Results   Component Value Date    GLUCOSE 138 (H) 07/23/2025    CALCIUM 8.4 (L) 07/23/2025     (L) 07/23/2025    K 3.8 07/23/2025    CO2 33 (H) 07/23/2025    CL 97 (L) 07/23/2025    BUN 12 07/23/2025    CREATININE 0.26 (L) 07/23/2025         Lab Results   Component Value Date     WBC 5.4 07/22/2025     HGB 17.3 (H) 07/22/2025     HCT 52.3 (H) 07/22/2025      07/22/2025      07/22/2025      Lab Results   Component Value Date    WBC 5.4 07/22/2025    HGB 17.3 (H) 07/22/2025    HCT 52.3 (H) 07/22/2025     07/22/2025     07/22/2025      [unfilled]   [unfilled]   No results found for the last 90 days.                  X-rays/ Images     [unfilled]   Radiology Results (last 21 days)     Procedure Component Value Units Date/Time   XR chest 2 views [[id number]] Collected: 07/21/25 0920   Order Status: Completed Updated: 07/21/25 0920   Narrative:     Interpreted By:  Schoenberger, Joseph,  STUDY:  XR CHEST 2 VIEWS;  7/21/2025 9:05 am      INDICATION:  Signs/Symptoms:SOB.          COMPARISON:  01/11/2023      ACCESSION NUMBER(S):  [id number]      ORDERING CLINICIAN:  JESSENIA BYRD      FINDINGS:                  CARDIOMEDIASTINAL SILHOUETTE:  Cardiomediastinal silhouette is normal in size and configuration.      LUNGS:  Right basal  infiltrate has resolved. No new focal lung opacity. No  pleural effusion or pneumothorax. Coarsened central lung markings  unchanged.      ABDOMEN:  No remarkable upper abdominal findings.      BONES:  No acute osseous changes.       Impression:     1.  No definite acute findings. See discussion above              MACRO:  None      Signed by: Joseph Schoenberger 7/21/2025 9:19 AM  Dictation workstation:   [id number]   XR elbow left 3+ views [[id number]] Collected: 07/21/25 0922   Order Status: Completed Updated: 07/21/25 0922   Narrative:     Interpreted By:  Schoenberger, Joseph,  STUDY:  XR ELBOW LEFT 3+ VIEWS; ;  7/21/2025 9:05 am      INDICATION:  Signs/Symptoms:fall.          COMPARISON:  None.      ACCESSION NUMBER(S):  [id number]      ORDERING CLINICIAN:  JESSENIA BYRD      FINDINGS:  There is mild enthesopathy at the medial epicondylar origin of the  common flexor tendon. There is no fracture or dislocation. No joint  effusion noted.       Impression:     No acute findings.          MACRO:  None      Signed by: Joseph Schoenberger 7/21/2025 9:21 AM  Dictation workstation:   [id number]                  Medical Problems         Problem List         * (Principal) COPD exacerbation (Multi)     Anxiety     Benign essential hypertension     COPD (chronic obstructive pulmonary disease) (Multi)     Fracture of second toe, left, closed, initial encounter     Hoarseness     Hyperlipidemia     Pulmonary nodules     Toe injury, right, initial encounter     Parotiditis     Sepsis (Multi)     Respiratory failure with hypoxia     Hypoxia     Dyspnea     Overview Signed 1/23/2024 12:01 PM by Erika Carvalho MA   Comment on above: SOB           Bronchospasm     Community acquired pneumonia     Current smoker     Tobacco dependence due to cigarettes     Alcohol consumption of four to five drinks per day                  Above medical problems may be reflective of historical medical problems that may have resolved  and may not related to acute clinical condition/medical problems.     Clinical impression/plan:        75 y.o. female with PMHx s/f COPD and emphysema, daily alcohol use, active tobacco dependence, hypertension, hyperlipidemia presenting with shortness of breath and syncopal episode yesterday.     Acute hypoxic respiratory failure secondary to COPD exacerbation  Patient with significant emphysema by history has been on intermittent oxygen in the past  Substitute Breo and Spiriva for Trelegy patient thinks she had adverse reaction   check magnesium level and supplement if able   7/23...........Continued treatment for acute COPD exacerbation including systematic corticosteroids, moderate to severe COPD exacerbation, empiric antibiotics .  Given current clinical improvement will de-escalate IV dosing with plan for transition to oral taper tomorrow.  Syncope  Appears self-limited due to shortness of breath  Possibly related to alcohol use or coughing  Will check echocardiogram to exclude LV dysfunction or structural abnormality   Will orthostatic vital signs to exclude ongoing orthostatic hypotension      7/23...........No recurrence of syncope, suspicion of hypoxia induced/related syncopal episode.Recent 2D echocardiogram reviewed ejection fraction preserved, no aortic valve stenosis reported.     Concern for adverse reaction to Trelegy  *The patient has increased mucous from trilogy, has tolerated Symbicort in past, consider discharge on  Symbicort and Spiriva to be given separately        History of alcohol use  Continue patient on CIWA protocol supplemental thiamine   7/23..........No clinical signs of alcohol withdrawal apparent at this juncture, symptoms triggered CIWA protocol in place.           History of tobacco dependence   7/23...........Cessation was advised.Nicotine replacement therapy ordered        Hypertension  We will reconcile patient's home medications      7/23...........Continue antihypertensive  therapy, given Solu-Medrol use mindful of worsening hypertension, adjust therapy antihypertensive therapy as needed.           Disposition/additional care plan/interventions/discharge planning and barriers: 7/23         Patient appeared to responded well to current interventions implemented.  Will continue to wean oxygen therapy, will discuss with case management concerning discharge planning and home oxygen needs.  Despite clinical improvement, patient still remains at risk for clinical decline, organ dysfunction and deterioration will continue patient monitoring today.Anticipate discharge in the next 24 to 48 hours.   The patient was informed of differential diagnosis , work up , plan of care and possible sequelae of clinical disposition.Patient in agreement with plan of care. Further recommendations forthcoming in accordance with patient's clinical disposition and response to care.        Dictation performed with assistance of voice recognition device therefore transcription errors are possible.

## 2025-07-23 NOTE — PROGRESS NOTES
Occupational Therapy    Occupational Therapy Treatment    Name: Rosa Ch  MRN: [id number]  Department: [facility] E  Room[facility]  Date: 07/23/25  Time Calculation  Start Time: 1247  Stop Time: 1302  Time Calculation (min): 15 min    Assessment:  OT Assessment: Pt requires min-CGA for functional transfers and mobility w/FWW. Pt would benefit from continued OT services to increase indpendence with ADLs, strength, and endurance.  Barriers to Discharge Home: Physical needs  End of Session Communication: Bedside nurse  End of Session Patient Position: Bed, 3 rail up, Alarm on  Plan:  Treatment Interventions: ADL retraining, Functional transfer training, Endurance training, Compensatory technique education  OT Frequency: 4 times per week (for this acute hospital stay)  OT Discharge Recommendations: Moderate intensity level of continued care (Pending progress. Based on current functional status and rehab potential, patient is anticipated to tolerate and benefit from 5 or more days per week of skilled rehabilitative therapy after discharge from this acute inpatient hospitalization.)  Equipment Recommended upon Discharge:  (tub bench)  OT Recommended Transfer Status: Minimal assist (+ another for safety)  OT - OK to Discharge: Yes    Subjective     OT Visit Info:  OT Received On: 07/23/25  General:  General  Prior to Session Communication: Bedside nurse  Patient Position Received: Bed, 3 rail up, Alarm on  General Comment: Pt alert and agreeable to therapy.  Precautions:  Medical Precautions: Fall precautions, Oxygen therapy device and L/min  Precautions Comment: Fall and CIWA precautions. Monitor PSO2 with activity.    Pain Assessment:  Pain Assessment  Pain Assessment: 0-10  0-10 (Numeric) Pain Score: 0 - No pain    Objective   Cognition:  Overall Cognitive Status: Within Functional Limits  Activities of Daily Living:      Grooming  Grooming Level of Assistance: Contact guard  Grooming Where Assessed: Standing  sinkside  Grooming Comments: oral care standing sinkside with FWW      LE Dressing  LE Dressing: Yes  Pants Level of Assistance: Contact guard  Sock Level of Assistance: Setup  LE Dressing Where Assessed: Chair  LE Dressing Comments: Pt doff/don hospital socks seated in chair with setup. Threads BLEs into pants seated in chair, brings up to knees and advances over hips while standing at FWW. CGA to maintain standing balance during task.         Functional Standing Tolerance:  Functional Standing Tolerance  Time: 2 min  Activity: oral hygiene  Functional Standing Tolerance Comments: w/FWW  Bed Mobility/Transfers: Bed Mobility  Bed Mobility: Yes  Bed Mobility 1  Bed Mobility 1: Supine to sitting, Sitting to supine  Level of Assistance 1: Contact guard  Bed Mobility Comments 1: HOB raised    Transfers  Transfer: Yes  Transfer 1  Technique 1: Sit to stand, Stand to sit  Transfer Device 1: Walker  Transfer Level of Assistance 1: Minimum assistance  Trials/Comments 1: cues for hand placement    Functional Mobility:  Functional Mobility  Functional Mobility Performed: Yes  Functional Mobility 1  Surface 1: Level tile  Device 1: Rolling walker  Assistance 1: Minimum assistance  Comments 1: within min household distance, assist to manage 02NC line     Therapy/Activity: Therapeutic Exercise  Therapeutic Exercise Performed: Yes  Therapeutic Exercise Activity 1: Pt completed BUE exerciseswith yellow therapband x10 reps each; chest flies, tricep ext.    Therapeutic Activity  Therapeutic Activity Performed: Yes  Therapeutic Activity 1: Pt participated in functional transfers, mobility, LB dressing, standing grooming.    Outcome Measures:  [facility] Daily Activity  Putting on and taking off regular lower body clothing: A little  Bathing (including washing, rinsing, drying): A lot  Putting on and taking off regular upper body clothing: A little  Toileting, which includes using toilet, bedpan or urinal: A lot  Taking care of personal  grooming such as brushing teeth: A little  Eating Meals: A little  Daily Activity - Total Score: 16        Education Documentation  Body Mechanics, taught by NICHELLE Javier at 7/23/2025  3:51 PM.  Learner: Patient  Readiness: Acceptance  Method: Explanation  Response: Needs Reinforcement    ADL Training, taught by NICHELLE Javier at 7/23/2025  3:51 PM.  Learner: Patient  Readiness: Acceptance  Method: Explanation  Response: Needs Reinforcement    Education Comments  No comments found.      Goals:  Encounter Problems       Encounter Problems (Active)       ADLs       Patient with complete LB dressing with SBA donning and doffing all LE clothes  with PRN adaptive equipment while supported sitting demonstrating energy conservation techniques. (Progressing)       Start:  07/21/25    Expected End:  08/04/25            Patient will complete toileting including hygiene & clothing management with SBA at bathroom level. (Progressing)       Start:  07/21/25    Expected End:  08/04/25               MOBILITY       Patient will perform Functional mobility mod Household distances with SBA and least restrictive device in order to improve safety and functional mobility. (Progressing)       Start:  07/21/25    Expected End:  08/04/25               TRANSFERS       Patient will complete functional transfers with least restrictive device with Mod I for improved safety. (Progressing)       Start:  07/21/25    Expected End:  08/04/25

## 2025-07-23 NOTE — CARE PLAN
Problem: Pain  Goal: Takes deep breaths with improved pain control throughout the shift  Outcome: Progressing     Problem: Pain - Adult  Goal: Verbalizes/displays adequate comfort level or baseline comfort level  Outcome: Progressing     Problem: Safety - Adult  Goal: Free from fall injury  Outcome: Progressing     Problem: Discharge Planning  Goal: Discharge to home or other facility with appropriate resources  Outcome: Progressing     Problem: Chronic Conditions and Co-morbidities  Goal: Patient's chronic conditions and co-morbidity symptoms are monitored and maintained or improved  Outcome: Progressing     Problem: Nutrition  Goal: Nutrient intake appropriate for maintaining nutritional needs  Outcome: Progressing     Problem: Fall/Injury  Goal: Not fall by end of shift  Outcome: Progressing  Goal: Be free from injury by end of the shift  Outcome: Progressing  Goal: Verbalize understanding of personal risk factors for fall in the hospital  Outcome: Progressing

## 2025-07-23 NOTE — PROGRESS NOTES
Physical Therapy    Physical Therapy Treatment    Patient Name: Rosa Ch  MRN: [id number]  Department: [address]  Room: [address] [place], [place] [address]  Today's Date: 7/23/2025  Time Calculation  Start Time: 0805  Stop Time: 0828  Time Calculation (min): 23 min         Assessment/Plan   PT Assessment  Barriers to Discharge Home: Caregiver assistance, Physical needs  End of Session Communication: Bedside nurse  Assessment Comment: pateint increased gait to 300 feet.  patient's SP02 was hovering around 85 persent, nursing notified  End of Session Patient Position: Up in chair, Alarm on  PT Plan  Inpatient/Swing Bed or Outpatient: Inpatient  PT Plan  Treatment/Interventions: Bed mobility, Transfer training, Gait training, Strengthening, Endurance training, Therapeutic exercise, Therapeutic activity  PT Plan: Ongoing PT  PT Frequency: 4 times per week  PT Discharge Recommendations:  (For this acute hospital stay)  Equipment Recommended upon Discharge:  (May benefit from a WW (none at home).)  PT Recommended Transfer Status: Assist x2  PT - OK to Discharge: Yes (To next level of care once medically cleared.)    PT Visit Info:  PT Received On: 07/23/25  Response to Previous Treatment: Patient reporting fatigue but able to participate.     General Visit Information:   General  Prior to Session Communication: Bedside nurse  Patient Position Received: Bed, 3 rail up, Alarm on  General Comment: patient eager for therapy.     Subjective   Precautions:  Precautions  Precautions Comment: Fall and CIWA precautions. Monitor PSO2 with activity.     Date/Time Vitals Session Patient Position Pulse Resp SpO2 BP MAP (mmHg)    07/23/25 0805 --  --  --  --  85 %  --  --     07/23/25 0815 --  --  80  18  --  161/66  98     07/23/25 0900 --  --  88  --  91 %  --  --            Objective   Pain:  Pain Assessment  Pain Assessment: 0-10  0-10 (Numeric) Pain Score: 5 - Moderate pain  Pain Type: Acute pain  Pain Location: Back  Pain Orientation:  (under left  shoulder blade)  Shoulder  circles, and shrugs with light  massage to decrease pain, per patient worked, did not rate pain again   Cognition:  Cognition  Overall Cognitive Status: Within Functional Limits    Activity Tolerance:  Activity Tolerance  Endurance: Tolerates 30 min exercise with multiple rests  Treatments:  patient performed supine to sit  cGA. seated  marches, ankle pumps LAQ, hip adduction 20 reps each, sit to stand CGa. gait training with  feet on 6 litres with  CGA.  stand to sit CGA.    Outcome Measures:  [facility] Basic Mobility  Turning from your back to your side while in a flat bed without using bedrails: A little  Moving from lying on your back to sitting on the side of a flat bed without using bedrails: A little  Moving to and from bed to chair (including a wheelchair): A little  Standing up from a chair using your arms (e.g. wheelchair or bedside chair): A little  To walk in hospital room: A little  Climbing 3-5 steps with railing: Total  Basic Mobility - Total Score: 16    Education Documentation  Mobility Training, taught by Ivis Mercado PTA at 7/23/2025  9:36 AM.  Learner: Patient  Readiness: Acceptance  Method: Explanation  Response: Verbalizes Understanding, Needs Reinforcement  Comment: safety with mobility    Education Comments  No comments found.        OP EDUCATION:       Encounter Problems       Encounter Problems (Active)       To improve balance, activity tolerance, overall mobility:        Patient will complete bed mobility with SBA; sit <> stand with CGA. (Progressing)       Start:  07/22/25    Expected End:  08/05/25            Patient will ambulate 50 feet with/out WW as needed with PSO2 WFL, CGA. (Progressing)       Start:  07/22/25    Expected End:  08/05/25            Patient will participate in dynamic stand balance activities for 3 minutes with 0-1 UE support with CGA, PSO2 WFL.  (Progressing)       Start:  07/22/25    Expected End:  08/05/25

## 2025-07-23 NOTE — PROGRESS NOTES
"Social work consult placed for positive medical risk screen for alcohol. SW reviewed pt's chart and communicated with TCC. SW met with pt to assess needs and provide support, introduced self and my role as  with care transition team. Explored pt's thoughts toward drinking behaviors, awareness, and willingness for tx. Pt stated she does not drink daily and is not a concern for her at this time. Pt expressed more concern with her living environment. Pt became very tearful and \"I need an assisted living facility.\" Pt stated she recently sold her 3 bedroom condo due to it being on third floor and moved into an apartment. Pt expressed being very unhappy with apartment she moved into as there is no patio and is very small. Pt stated she is connected with [facility] [employer] and pays a lady to assist with light housekeeping chores within apartment. SW discussed [employer] [facility] with pt and she is interested in speaking with their representative, Terry Silvestre. SW to place referral in [facility] and follow as needed.     Aaliyah Boyle, MSW, LSW ([id number])   Care Transitions    "

## 2025-07-24 ENCOUNTER — DOCUMENTATION (OUTPATIENT)
Dept: HOME HEALTH SERVICES | Facility: HOME HEALTH | Age: 75
End: 2025-07-24
Payer: MEDICARE

## 2025-07-24 ENCOUNTER — HOME HEALTH ADMISSION (OUTPATIENT)
Dept: HOME HEALTH SERVICES | Facility: HOME HEALTH | Age: 75
End: 2025-07-24
Payer: MEDICARE

## 2025-07-24 ENCOUNTER — PHARMACY VISIT (OUTPATIENT)
Dept: PHARMACY | Facility: CLINIC | Age: 75
End: 2025-07-24
Payer: MEDICARE

## 2025-07-24 VITALS
WEIGHT: 129.19 LBS | TEMPERATURE: 97.9 F | OXYGEN SATURATION: 91 % | DIASTOLIC BLOOD PRESSURE: 82 MMHG | SYSTOLIC BLOOD PRESSURE: 153 MMHG | BODY MASS INDEX: 20.76 KG/M2 | HEIGHT: 66 IN | HEART RATE: 84 BPM | RESPIRATION RATE: 16 BRPM

## 2025-07-24 LAB
ANION GAP SERPL CALC-SCNC: 8 MMOL/L (ref 10–20)
BUN SERPL-MCNC: 12 MG/DL (ref 6–23)
CALCIUM SERPL-MCNC: 8.3 MG/DL (ref 8.6–10.3)
CHLORIDE SERPL-SCNC: 98 MMOL/L (ref 98–107)
CO2 SERPL-SCNC: 33 MMOL/L (ref 21–32)
CREAT SERPL-MCNC: 0.25 MG/DL (ref 0.5–1.05)
EGFRCR SERPLBLD CKD-EPI 2021: >90 ML/MIN/1.73M*2
GLUCOSE SERPL-MCNC: 139 MG/DL (ref 74–99)
POTASSIUM SERPL-SCNC: 3.7 MMOL/L (ref 3.5–5.3)
SODIUM SERPL-SCNC: 135 MMOL/L (ref 136–145)

## 2025-07-24 PROCEDURE — 2500000001 HC RX 250 WO HCPCS SELF ADMINISTERED DRUGS (ALT 637 FOR MEDICARE OP): Performed by: NURSE PRACTITIONER

## 2025-07-24 PROCEDURE — 94761 N-INVAS EAR/PLS OXIMETRY MLT: CPT

## 2025-07-24 PROCEDURE — 80048 BASIC METABOLIC PNL TOTAL CA: CPT | Performed by: HOSPITALIST

## 2025-07-24 PROCEDURE — 2500000004 HC RX 250 GENERAL PHARMACY W/ HCPCS (ALT 636 FOR OP/ED): Performed by: HOSPITALIST

## 2025-07-24 PROCEDURE — 36415 COLL VENOUS BLD VENIPUNCTURE: CPT | Performed by: HOSPITALIST

## 2025-07-24 PROCEDURE — RXMED WILLOW AMBULATORY MEDICATION CHARGE

## 2025-07-24 PROCEDURE — 94640 AIRWAY INHALATION TREATMENT: CPT

## 2025-07-24 PROCEDURE — 2500000002 HC RX 250 W HCPCS SELF ADMINISTERED DRUGS (ALT 637 FOR MEDICARE OP, ALT 636 FOR OP/ED): Performed by: NURSE PRACTITIONER

## 2025-07-24 PROCEDURE — 99239 HOSP IP/OBS DSCHRG MGMT >30: CPT | Performed by: HOSPITALIST

## 2025-07-24 PROCEDURE — 2500000004 HC RX 250 GENERAL PHARMACY W/ HCPCS (ALT 636 FOR OP/ED): Performed by: NURSE PRACTITIONER

## 2025-07-24 RX ORDER — FLUTICASONE FUROATE AND VILANTEROL 200; 25 UG/1; UG/1
1 POWDER RESPIRATORY (INHALATION)
Qty: 60 EACH | Refills: 0 | Status: SHIPPED | OUTPATIENT
Start: 2025-07-25 | End: 2025-07-24 | Stop reason: HOSPADM

## 2025-07-24 RX ORDER — ALBUTEROL SULFATE 90 UG/1
2 INHALANT RESPIRATORY (INHALATION) EVERY 6 HOURS PRN
Qty: 18 G | Refills: 11 | Status: SHIPPED | OUTPATIENT
Start: 2025-07-24

## 2025-07-24 RX ORDER — AMLODIPINE BESYLATE 5 MG/1
5 TABLET ORAL DAILY
Qty: 30 TABLET | Refills: 0 | Status: SHIPPED | OUTPATIENT
Start: 2025-07-24

## 2025-07-24 RX ORDER — BISMUTH SUBSALICYLATE 262 MG
1 TABLET,CHEWABLE ORAL DAILY
Qty: 30 TABLET | Refills: 0 | Status: SHIPPED | OUTPATIENT
Start: 2025-07-25

## 2025-07-24 RX ORDER — FLUTICASONE FUROATE, UMECLIDINIUM BROMIDE AND VILANTEROL TRIFENATATE 200; 62.5; 25 UG/1; UG/1; UG/1
1 POWDER RESPIRATORY (INHALATION) DAILY
Qty: 60 EACH | Refills: 0 | Status: SHIPPED | OUTPATIENT
Start: 2025-07-24 | End: 2025-07-24 | Stop reason: HOSPADM

## 2025-07-24 RX ORDER — DOXYCYCLINE 100 MG/1
100 CAPSULE ORAL EVERY 12 HOURS SCHEDULED
Qty: 6 CAPSULE | Refills: 0 | Status: SHIPPED | OUTPATIENT
Start: 2025-07-24 | End: 2025-07-27

## 2025-07-24 RX ORDER — PREDNISONE 10 MG/1
10 TABLET ORAL DAILY
Status: DISCONTINUED | OUTPATIENT
Start: 2025-07-30 | End: 2025-07-24 | Stop reason: HOSPADM

## 2025-07-24 RX ORDER — DOXYCYCLINE 100 MG/1
100 CAPSULE ORAL EVERY 12 HOURS SCHEDULED
Status: DISCONTINUED | OUTPATIENT
Start: 2025-07-24 | End: 2025-07-24 | Stop reason: HOSPADM

## 2025-07-24 RX ORDER — PREDNISONE 20 MG/1
20 TABLET ORAL DAILY
Status: DISCONTINUED | OUTPATIENT
Start: 2025-07-24 | End: 2025-07-24 | Stop reason: HOSPADM

## 2025-07-24 RX ORDER — PREDNISONE 5 MG/1
5 TABLET ORAL DAILY
Status: DISCONTINUED | OUTPATIENT
Start: 2025-08-02 | End: 2025-07-24 | Stop reason: HOSPADM

## 2025-07-24 RX ORDER — PREDNISONE 5 MG/1
TABLET ORAL
Qty: 30 TABLET | Refills: 0 | Status: SHIPPED | OUTPATIENT
Start: 2025-07-24 | End: 2025-08-05

## 2025-07-24 RX ORDER — ALBUTEROL SULFATE 90 UG/1
2 INHALANT RESPIRATORY (INHALATION) EVERY 6 HOURS PRN
Status: DISCONTINUED | OUTPATIENT
Start: 2025-07-24 | End: 2025-07-24 | Stop reason: HOSPADM

## 2025-07-24 RX ADMIN — POLYETHYLENE GLYCOL 3350 17 G: 17 POWDER, FOR SOLUTION ORAL at 09:42

## 2025-07-24 RX ADMIN — CITALOPRAM HYDROBROMIDE 20 MG: 20 TABLET ORAL at 09:42

## 2025-07-24 RX ADMIN — AMLODIPINE BESYLATE 2.5 MG: 5 TABLET ORAL at 09:42

## 2025-07-24 RX ADMIN — PREDNISONE 20 MG: 20 TABLET ORAL at 14:07

## 2025-07-24 RX ADMIN — DOXYCYCLINE 100 MG: 100 INJECTION, POWDER, LYOPHILIZED, FOR SOLUTION INTRAVENOUS at 09:42

## 2025-07-24 RX ADMIN — PANTOPRAZOLE SODIUM 40 MG: 40 INJECTION, POWDER, FOR SOLUTION INTRAVENOUS at 05:14

## 2025-07-24 RX ADMIN — IPRATROPIUM BROMIDE AND ALBUTEROL SULFATE 3 ML: 2.5; .5 SOLUTION RESPIRATORY (INHALATION) at 07:27

## 2025-07-24 RX ADMIN — TIOTROPIUM BROMIDE INHALATION SPRAY 2 PUFF: 3.12 SPRAY, METERED RESPIRATORY (INHALATION) at 09:42

## 2025-07-24 RX ADMIN — METHYLPREDNISOLONE SODIUM SUCCINATE 20 MG: 40 INJECTION, POWDER, FOR SOLUTION INTRAMUSCULAR; INTRAVENOUS at 05:14

## 2025-07-24 RX ADMIN — LISINOPRIL 40 MG: 10 TABLET ORAL at 09:42

## 2025-07-24 RX ADMIN — FOLIC ACID 1 MG: 1 TABLET ORAL at 09:42

## 2025-07-24 RX ADMIN — Medication 1 TABLET: at 09:41

## 2025-07-24 RX ADMIN — THIAMINE HCL TAB 100 MG 100 MG: 100 TAB at 09:42

## 2025-07-24 RX ADMIN — IPRATROPIUM BROMIDE AND ALBUTEROL SULFATE 3 ML: 2.5; .5 SOLUTION RESPIRATORY (INHALATION) at 10:59

## 2025-07-24 RX ADMIN — FLUTICASONE FUROATE AND VILANTEROL TRIFENATATE 1 PUFF: 200; 25 POWDER RESPIRATORY (INHALATION) at 09:42

## 2025-07-24 ASSESSMENT — COGNITIVE AND FUNCTIONAL STATUS - GENERAL
CLIMB 3 TO 5 STEPS WITH RAILING: A LITTLE
TOILETING: A LITTLE
MOVING TO AND FROM BED TO CHAIR: A LITTLE
WALKING IN HOSPITAL ROOM: A LITTLE
DAILY ACTIVITIY SCORE: 21
MOBILITY SCORE: 21
DRESSING REGULAR LOWER BODY CLOTHING: A LITTLE
HELP NEEDED FOR BATHING: A LITTLE

## 2025-07-24 ASSESSMENT — LIFESTYLE VARIABLES
HEADACHE, FULLNESS IN HEAD: NOT PRESENT
VISUAL DISTURBANCES: NOT PRESENT
TOTAL SCORE: 0
AGITATION: NORMAL ACTIVITY
NAUSEA AND VOMITING: NO NAUSEA AND NO VOMITING
ANXIETY: NO ANXIETY, AT EASE
TREMOR: NO TREMOR
PAROXYSMAL SWEATS: NO SWEAT VISIBLE
ORIENTATION AND CLOUDING OF SENSORIUM: ORIENTED AND CAN DO SERIAL ADDITIONS
AUDITORY DISTURBANCES: NOT PRESENT

## 2025-07-24 ASSESSMENT — PAIN SCALES - GENERAL: PAINLEVEL_OUTOF10: 0 - NO PAIN

## 2025-07-24 ASSESSMENT — PAIN - FUNCTIONAL ASSESSMENT: PAIN_FUNCTIONAL_ASSESSMENT: 0-10

## 2025-07-24 NOTE — PROGRESS NOTES
Rosa Ch [id number]   Service: Internal Medicine / Hospitalist Date of service: 7/24/2025                                  Full Code            Subjective       History Of Present Illness (HPI):  Rosa Ch is a 75 y.o. female with PMHx s/f COPD and emphysema, daily alcohol use, active tobacco dependence, hypertension, hyperlipidemia presenting with shortness of breath and syncopal episode yesterday.  Patient has been taking Trelegy for an inhaler believe she is having adverse reaction to this with increased mucus buildup.  Last night she had severe episode with intractable coughing was gasping for breath patient did have syncopal episode.  Patient has not been experiencing any fevers or chills no chest pain.  In the emergency department patient workup suggested hypoxia with COPD exacerbation.  Patient did get Solu-Medrol prior to arrival nebulizer treatments with symptomatic improvement.  Case was discussed with the ED provider plan is to admit patient for further workup and evaluation it is unclear if length of stay will need to exceed 2 midnights.     ED Course:   Vitals on presentation: T 36.7 °C (98.1 °F)  HR (!) 104  BP (!) 184/98  RR 14  O2 (!) 90 % None (Room air)  Labs:   CBC with WBC 8.3, Hgb 21.0, Plts 213.   CMP with glucose 89, Na 138, K 4.1, BUN 6, sCr 0.34, alk phos 71, ALT 20, AST 22, bilirubin 1.4. Magnesium No results found for requested labs within last 365 days..   BNP 46. Trop 4.   Lactate No results found for requested labs within last 365 days.  EKG: Sinus rhythm QTc 463 ms no ST segment to suggest acute ischemia  Imaging - agree with radiology interpretation(s):   Interventions: DuoNeb treatment, normal saline        Decision made to admit the patient to the hospitalist service after evaluation of the patient, review of the above, and discussion with ED provider.      7/22..................[facility] rounding with patient's nurse at bedside.No reported: Seizure activities,  syncope, presyncope, dyspnea, fevers, chills.  The patient endorsed dyspnea but improved.        7/23.............[facility] rounding with patient's nurse at bedside.7/22.............. patient's nurse reported patient was a bit tearful this morning.  No reported suicidal ideation, homicidal ideation or depression.  Patient stated that she was just worried about her home.  Overall patient felt that she had improved in comparison to last 24 hours.No reported chest pains, dyspnea at rest, headaches, nausea, vomiting, fevers or chills.  Throughout the day we have been able to titrate patient down from 5 to 2 L of oxygen     7/24.......................     Review of Systems:   Review of system otherwise negative if not aforementioned above in subjective.     Objective     Physical Exam      Constitutional:       Appearance: Patient appeared in no acute cardiopulmonary distress.     Comments: Patient alert and oriented to person ,place ,time and situation.The patient was able to speak in full sentences with supplemental oxygen.  HEENT:      Head: Normocephalic and atraumatic.Trachea midline      Nose:No observed congestion or rhinorrhea.     Mouth/Throat: Mucous membranes Moist, Trachea appeared  midline.  Eyes:      Extraocular Movements: Extraocular movements intact.      Pupils: Pupils are equal, round, and reactive to light.      Comments: No scleral icterus or conjunctival injection appreciated.   Cardiovascular:      Rate and Rhythm: Normal rate and regular rhythm. No clicks rubs or gallops, normal S1 and S2.No peripheral stigmata of endocarditis appreciated.     Pulmonary:     Lung bases  still diminished no adventitious sound appreciated.  Abdominal:      General: Abdomen soft, nontender, active bowel sounds, no involuntary guarding or rebound tenderness appreciated.     Comments: None   Musculoskeletal:       Patient appeared to have full active range of motion for upper and lower extremities, no acute apparent joint  deformity appreciated on examination.   No pitting edema or cyanosis appreciated.      Skin:     General: Skin is warm.      Coloration:  No jaundice     Findings: Left elbow ecchymosis  Neurological:      General: No focal sensory or motor deficits appreciated.     Cranial Nerves: Cranial nerves II to XII appearing grossly intact.     Genitals:  Deferred  Psychiatric:         Normal-appearing mood and affect     Labs:     Lab Results   Component Value Date    GLUCOSE 139 (H) 07/24/2025    CALCIUM 8.3 (L) 07/24/2025     (L) 07/24/2025    K 3.7 07/24/2025    CO2 33 (H) 07/24/2025    CL 98 07/24/2025    BUN 12 07/24/2025    CREATININE 0.25 (L) 07/24/2025      Lab Results   Component Value Date    WBC 5.4 07/22/2025    HGB 17.3 (H) 07/22/2025    HCT 52.3 (H) 07/22/2025     07/22/2025     07/22/2025         [unfilled]   [unfilled]   No results found for the last 90 days.                  X-rays/ Images     [unfilled]   Radiology Results (last 21 days)     Procedure Component Value Units Date/Time   XR chest 2 views [[id number]] Collected: 07/21/25 0920   Order Status: Completed Updated: 07/21/25 0920   Narrative:     Interpreted By:  Schoenberger, Joseph,  STUDY:  XR CHEST 2 VIEWS;  7/21/2025 9:05 am      INDICATION:  Signs/Symptoms:SOB.          COMPARISON:  01/11/2023      ACCESSION NUMBER(S):  [id number]      ORDERING CLINICIAN:  JESSENIA BYRD      FINDINGS:                  CARDIOMEDIASTINAL SILHOUETTE:  Cardiomediastinal silhouette is normal in size and configuration.      LUNGS:  Right basal infiltrate has resolved. No new focal lung opacity. No  pleural effusion or pneumothorax. Coarsened central lung markings  unchanged.      ABDOMEN:  No remarkable upper abdominal findings.      BONES:  No acute osseous changes.       Impression:     1.  No definite acute findings. See discussion above              MACRO:  None      Signed by: Joseph Schoenberger 7/21/2025 9:19  AM  Dictation workstation:   [id number]   XR elbow left 3+ views [[id number]] Collected: 07/21/25 0922   Order Status: Completed Updated: 07/21/25 0922   Narrative:     Interpreted By:  Schoenberger, Joseph,  STUDY:  XR ELBOW LEFT 3+ VIEWS; ;  7/21/2025 9:05 am      INDICATION:  Signs/Symptoms:fall.          COMPARISON:  None.      ACCESSION NUMBER(S):  [id number]      ORDERING CLINICIAN:  JESSENIA BYRD      FINDINGS:  There is mild enthesopathy at the medial epicondylar origin of the  common flexor tendon. There is no fracture or dislocation. No joint  effusion noted.       Impression:     No acute findings.          MACRO:  None      Signed by: Joseph Schoenberger 7/21/2025 9:21 AM  Dictation workstation:   [id number]                  Medical Problems         Problem List         * (Principal) COPD exacerbation (Multi)     Anxiety     Benign essential hypertension     COPD (chronic obstructive pulmonary disease) (Multi)     Fracture of second toe, left, closed, initial encounter     Hoarseness     Hyperlipidemia     Pulmonary nodules     Toe injury, right, initial encounter     Parotiditis     Sepsis (Multi)     Respiratory failure with hypoxia     Hypoxia     Dyspnea     Overview Signed 1/23/2024 12:01 PM by Erika Carvalho MA   Comment on above: SOB           Bronchospasm     Community acquired pneumonia     Current smoker     Tobacco dependence due to cigarettes     Alcohol consumption of four to five drinks per day                  Above medical problems may be reflective of historical medical problems that may have resolved and may not related to acute clinical condition/medical problems.     Clinical impression/plan:        75 y.o. female with PMHx s/f COPD and emphysema, daily alcohol use, active tobacco dependence, hypertension, hyperlipidemia presenting with shortness of breath and syncopal episode yesterday.     Acute hypoxic respiratory failure secondary to COPD exacerbation  Patient with  significant emphysema by history has been on intermittent oxygen in the past  Substitute Breo and Spiriva for Trelegy patient thinks she had adverse reaction   check magnesium level and supplement if able   7/23...........Continued treatment for acute COPD exacerbation including systematic corticosteroids, moderate to severe COPD exacerbation, empiric antibiotics .  Given current clinical improvement will de-escalate IV dosing with plan for transition to oral taper tomorrow.  Syncope  Appears self-limited due to shortness of breath  Possibly related to alcohol use or coughing  Will check echocardiogram to exclude LV dysfunction or structural abnormality   Will orthostatic vital signs to exclude ongoing orthostatic hypotension      7/23...........No recurrence of syncope, suspicion of hypoxia induced/related syncopal episode.Recent 2D echocardiogram reviewed ejection fraction preserved, no aortic valve stenosis reported.     Concern for adverse reaction to Trelegy  *The patient has increased mucous from trilogy, has tolerated Symbicort in past, consider discharge on  Symbicort and Spiriva to be given separately        History of alcohol use  Continue patient on CIWA protocol supplemental thiamine   7/23..........No clinical signs of alcohol withdrawal apparent at this juncture, symptoms triggered CIWA protocol in place.           History of tobacco dependence   7/23...........Cessation was advised.Nicotine replacement therapy ordered        Hypertension  We will reconcile patient's home medications      7/23...........Continue antihypertensive therapy, given Solu-Medrol use mindful of worsening hypertension, adjust therapy antihypertensive therapy as needed.              Disposition/additional care plan/interventions/discharge planning : 7/24         The patient was informed of differential diagnosis , work up , plan of care and possible sequelae of clinical disposition. Patient in agreement with plan of care.  Discharge planning: Patient requesting discharge home today.    Patient in agreement with discharge    Patient should seek medical attention immediately if condition should worsen, new symptoms develop , no further improvement or recurrence of presenting symptomatology, patient warned.      Addendum:   Home O2 evaluation performed.  Patient requiring 2 L O2 at rest and 3 L with ambulation.  Patient refused additional O2 tanks at home per respiratory therapist.  Patient reported that she have her inogen which goes up to her current o2 required needs.Patient reported intolerance to Trelegy, requested Breztri, prescription written.     Dictation performed with assistance of voice recognition device therefore transcription errors are possible.

## 2025-07-24 NOTE — DISCHARGE INSTRUCTIONS
Alcohol cessation advised    Tobacco cessation advised    Please take prednisone and doxycycline with food.      Patient should seek medical attention immediately if condition should worsen, new symptoms develop , no further improvement or recurrence of presenting symptomatology, patient warned.      Home O2 evaluation performed.  Patient requiring 2 L O2 at rest and 3 L with ambulation.  Patient refused additional O2 tanks at home per respiratory therapist.  Patient reported that she have her inogen which goes up to her current o2 required needs.    Discharge home with oxygen tank for travel

## 2025-07-24 NOTE — CARE PLAN
Home O2 Evaluation:  completed    SpO2 on room air at rest:   85  %/ hr 90/ rr 18/ ra rest    SpO2 on room air with exertion:    na     SpO2 on oxygen at rest:     94%/ hr 75/ rr4 18/ 2l    SpO2 on oxygen with exertion:    90 %/ hr 100/ rr 18/ 3l    SpO2 on 4L/min O2 with exertion:   na     Ambulation distance:     245 ft    Recommended O2 device:  nc    Recommended O2 L/min:  2l at rest/ 3l with ambulation    Recommended FiO2 %:    ([facility] at [facility] going O2.)      Pt already owns her own inogen.

## 2025-07-24 NOTE — CARE PLAN
Problem: Pain  Goal: Takes deep breaths with improved pain control throughout the shift  Outcome: Progressing  Goal: Turns in bed with improved pain control throughout the shift  Outcome: Progressing  Goal: Walks with improved pain control throughout the shift  Outcome: Progressing  Goal: Performs ADL's with improved pain control throughout shift  Outcome: Progressing  Goal: Participates in PT with improved pain control throughout the shift  Outcome: Progressing  Goal: Free from opioid side effects throughout the shift  Outcome: Progressing  Goal: Free from acute confusion related to pain meds throughout the shift  Outcome: Progressing     Problem: Pain - Adult  Goal: Verbalizes/displays adequate comfort level or baseline comfort level  Outcome: Progressing     Problem: Safety - Adult  Goal: Free from fall injury  Outcome: Progressing     Problem: Discharge Planning  Goal: Discharge to home or other facility with appropriate resources  Outcome: Progressing     Problem: Chronic Conditions and Co-morbidities  Goal: Patient's chronic conditions and co-morbidity symptoms are monitored and maintained or improved  Outcome: Progressing     Problem: Nutrition  Goal: Nutrient intake appropriate for maintaining nutritional needs  Outcome: Progressing     Problem: Fall/Injury  Goal: Not fall by end of shift  Outcome: Progressing  Goal: Be free from injury by end of the shift  Outcome: Progressing  Goal: Verbalize understanding of personal risk factors for fall in the hospital  Outcome: Progressing  Goal: Verbalize understanding of risk factor reduction measures to prevent injury from fall in the home  Outcome: Progressing  Goal: Use assistive devices by end of the shift  Outcome: Progressing  Goal: Pace activities to prevent fatigue by end of the shift  Outcome: Progressing   The patient's goals for the shift include Patient remains safe and comfortable    The clinical goals for the shift include Patient remains on 2L w/  SpO2 >90%

## 2025-07-24 NOTE — DISCHARGE SUMMARY
Discharge Summary    Rosa Ch    [id number]     7/21/2025  7:54 AM , admit date    7/24/2025, discharge date      .      Discharge Diagnosis:    1 acute on chronic hypoxic respiratory failure secondary to COPD exacerbation    2.  COPD exacerbation    3.  Syncope without reoccurrence suspected hypoxia related    4.  Alcohol use disorder, alcohol cessation advised    5.  Tobacco use , tobacco cessation advised    6.  Hypertension      Problem List Items Addressed This Visit           ICD-10-CM       Pulmonary and Pneumonias    Respiratory failure with hypoxia J96.91    Relevant Orders    Transthoracic Echo Complete (Completed)    Hypoxia R09.02    * (Principal) COPD exacerbation (Multi) - Primary J44.1    Relevant Medications    tiotropium (Spiriva Respimat) 2.5 mcg/actuation inhaler 2 puff    fluticasone furoate-vilanteroL (Breo Ellipta) 200-25 mcg/dose inhaler 1 puff    nicotine (Nicoderm CQ) 14 mg/24 hr patch 1 patch    ipratropium-albuteroL (Duo-Neb) 0.5-2.5 mg/3 mL nebulizer solution 3 mL     Other Visit Diagnoses         Codes      Syncope and collapse     R55    Relevant Orders    Transthoracic Echo Complete (Completed)               Hospital Course/Progress note on the date of  discharge.    Rosa Ch [id number]   Service: Internal Medicine / Hospitalist Date of service: 7/24/2025                                  Full Code            Subjective       History Of Present Illness (HPI):  Rosa Ch is a 75 y.o. female with PMHx s/f COPD and emphysema, daily alcohol use, active tobacco dependence, hypertension, hyperlipidemia presenting with shortness of breath and syncopal episode yesterday.  Patient has been taking Trelegy for an inhaler believe she is having adverse reaction to this with increased mucus buildup.  Last night she had severe episode with intractable coughing was gasping for breath patient did have syncopal episode.  Patient has not been experiencing any fevers or chills no chest pain.   In the emergency department patient workup suggested hypoxia with COPD exacerbation.  Patient did get Solu-Medrol prior to arrival nebulizer treatments with symptomatic improvement.  Case was discussed with the ED provider plan is to admit patient for further workup and evaluation it is unclear if length of stay will need to exceed 2 midnights.     ED Course:   Vitals on presentation: T 36.7 °C (98.1 °F)  HR (!) 104  BP (!) 184/98  RR 14  O2 (!) 90 % None (Room air)  Labs:   CBC with WBC 8.3, Hgb 21.0, Plts 213.   CMP with glucose 89, Na 138, K 4.1, BUN 6, sCr 0.34, alk phos 71, ALT 20, AST 22, bilirubin 1.4. Magnesium No results found for requested labs within last 365 days..   BNP 46. Trop 4.   Lactate No results found for requested labs within last 365 days.  EKG: Sinus rhythm QTc 463 ms no ST segment to suggest acute ischemia  Imaging - agree with radiology interpretation(s):   Interventions: DuoNeb treatment, normal saline        Decision made to admit the patient to the hospitalist service after evaluation of the patient, review of the above, and discussion with ED provider.      7/22..................[facility] rounding with patient's nurse at bedside.No reported: Seizure activities, syncope, presyncope, dyspnea, fevers, chills.  The patient endorsed dyspnea but improved.        7/23.............[facility] rounding with patient's nurse at bedside.7/22.............. patient's nurse reported patient was a bit tearful this morning.  No reported suicidal ideation, homicidal ideation or depression.  Patient stated that she was just worried about her home.  Overall patient felt that she had improved in comparison to last 24 hours.No reported chest pains, dyspnea at rest, headaches, nausea, vomiting, fevers or chills.  Throughout the day we have been able to titrate patient down from 5 to 2 L of oxygen     7/24.......................     Review of Systems:   Review of system otherwise negative if not aforementioned above  in subjective.     Objective     Physical Exam      Constitutional:       Appearance: Patient appeared in no acute cardiopulmonary distress.     Comments: Patient alert and oriented to person ,place ,time and situation.The patient was able to speak in full sentences with supplemental oxygen.  HEENT:      Head: Normocephalic and atraumatic.Trachea midline      Nose:No observed congestion or rhinorrhea.     Mouth/Throat: Mucous membranes Moist, Trachea appeared  midline.  Eyes:      Extraocular Movements: Extraocular movements intact.      Pupils: Pupils are equal, round, and reactive to light.      Comments: No scleral icterus or conjunctival injection appreciated.   Cardiovascular:      Rate and Rhythm: Normal rate and regular rhythm. No clicks rubs or gallops, normal S1 and S2.No peripheral stigmata of endocarditis appreciated.     Pulmonary:     Lung bases  still diminished no adventitious sound appreciated.  Abdominal:      General: Abdomen soft, nontender, active bowel sounds, no involuntary guarding or rebound tenderness appreciated.     Comments: None   Musculoskeletal:       Patient appeared to have full active range of motion for upper and lower extremities, no acute apparent joint deformity appreciated on examination.   No pitting edema or cyanosis appreciated.      Skin:     General: Skin is warm.      Coloration:  No jaundice     Findings: Left elbow ecchymosis  Neurological:      General: No focal sensory or motor deficits appreciated.     Cranial Nerves: Cranial nerves II to XII appearing grossly intact.     Genitals:  Deferred  Psychiatric:         Normal-appearing mood and affect     Labs:           Lab Results   Component Value Date     GLUCOSE 139 (H) 07/24/2025     CALCIUM 8.3 (L) 07/24/2025      (L) 07/24/2025     K 3.7 07/24/2025     CO2 33 (H) 07/24/2025     CL 98 07/24/2025     BUN 12 07/24/2025     CREATININE 0.25 (L) 07/24/2025            Lab Results   Component Value Date     WBC 5.4  07/22/2025     HGB 17.3 (H) 07/22/2025     HCT 52.3 (H) 07/22/2025      07/22/2025      07/22/2025         [unfilled]   [unfilled]   No results found for the last 90 days.                  X-rays/ Images     [unfilled]   Radiology Results (last 21 days)     Procedure Component Value Units Date/Time   XR chest 2 views [[id number]] Collected: 07/21/25 0920   Order Status: Completed Updated: 07/21/25 0920   Narrative:     Interpreted By:  Schoenberger, Joseph,  STUDY:  XR CHEST 2 VIEWS;  7/21/2025 9:05 am      INDICATION:  Signs/Symptoms:SOB.          COMPARISON:  01/11/2023      ACCESSION NUMBER(S):  [id number]      ORDERING CLINICIAN:  JESSENIA BYRD      FINDINGS:                  CARDIOMEDIASTINAL SILHOUETTE:  Cardiomediastinal silhouette is normal in size and configuration.      LUNGS:  Right basal infiltrate has resolved. No new focal lung opacity. No  pleural effusion or pneumothorax. Coarsened central lung markings  unchanged.      ABDOMEN:  No remarkable upper abdominal findings.      BONES:  No acute osseous changes.       Impression:     1.  No definite acute findings. See discussion above              MACRO:  None      Signed by: Joseph Schoenberger 7/21/2025 9:19 AM  Dictation workstation:   [id number]   XR elbow left 3+ views [[id number]] Collected: 07/21/25 0922   Order Status: Completed Updated: 07/21/25 0922   Narrative:     Interpreted By:  Schoenberger, Joseph,  STUDY:  XR ELBOW LEFT 3+ VIEWS; ;  7/21/2025 9:05 am      INDICATION:  Signs/Symptoms:fall.          COMPARISON:  None.      ACCESSION NUMBER(S):  [id number]      ORDERING CLINICIAN:  JESSENIA BYRD      FINDINGS:  There is mild enthesopathy at the medial epicondylar origin of the  common flexor tendon. There is no fracture or dislocation. No joint  effusion noted.       Impression:     No acute findings.          MACRO:  None      Signed by: Joseph Schoenberger 7/21/2025 9:21 AM  Dictation  workstation:   [id number]                  Medical Problems         Problem List              * (Principal) COPD exacerbation (Multi)      Anxiety      Benign essential hypertension      COPD (chronic obstructive pulmonary disease) (Multi)      Fracture of second toe, left, closed, initial encounter      Hoarseness      Hyperlipidemia      Pulmonary nodules      Toe injury, right, initial encounter      Parotiditis      Sepsis (Multi)      Respiratory failure with hypoxia      Hypoxia      Dyspnea      Overview Signed 1/23/2024 12:01 PM by Erika Carvalho MA   Comment on above: SOB           Bronchospasm      Community acquired pneumonia      Current smoker      Tobacco dependence due to cigarettes      Alcohol consumption of four to five drinks per day                   Above medical problems may be reflective of historical medical problems that may have resolved and may not related to acute clinical condition/medical problems.     Clinical impression/plan:        75 y.o. female with PMHx s/f COPD and emphysema, daily alcohol use, active tobacco dependence, hypertension, hyperlipidemia presenting with shortness of breath and syncopal episode yesterday.     Acute hypoxic respiratory failure secondary to COPD exacerbation  Patient with significant emphysema by history has been on intermittent oxygen in the past  Substitute Breo and Spiriva for Trelegy patient thinks she had adverse reaction   check magnesium level and supplement if able   7/23...........Continued treatment for acute COPD exacerbation including systematic corticosteroids, moderate to severe COPD exacerbation, empiric antibiotics .  Given current clinical improvement will de-escalate IV dosing with plan for transition to oral taper tomorrow.  Syncope  Appears self-limited due to shortness of breath  Possibly related to alcohol use or coughing  Will check echocardiogram to exclude LV dysfunction or structural abnormality   Will orthostatic vital signs  to exclude ongoing orthostatic hypotension      7/23...........No recurrence of syncope, suspicion of hypoxia induced/related syncopal episode.Recent 2D echocardiogram reviewed ejection fraction preserved, no aortic valve stenosis reported.     Concern for adverse reaction to Trelegy  *The patient has increased mucous from trilogy, has tolerated Symbicort in past, consider discharge on  Symbicort and Spiriva to be given separately        History of alcohol use  Continue patient on CIWA protocol supplemental thiamine   7/23..........No clinical signs of alcohol withdrawal apparent at this juncture, symptoms triggered CIWA protocol in place.           History of tobacco dependence   7/23...........Cessation was advised.Nicotine replacement therapy ordered        Hypertension  We will reconcile patient's home medications      7/23...........Continue antihypertensive therapy, given Solu-Medrol use mindful of worsening hypertension, adjust therapy antihypertensive therapy as needed.              Disposition/additional care plan/interventions/discharge planning : 7/24         The patient was informed of differential diagnosis , work up , plan of care and possible sequelae of clinical disposition. Patient in agreement with plan of care. Discharge planning: Patient requesting discharge home today.     Patient in agreement with discharge     Patient should seek medical attention immediately if condition should worsen, new symptoms develop , no further improvement or recurrence of presenting symptomatology, patient warned.        Addendum:   Home O2 evaluation performed.  Patient requiring 2 L O2 at rest and 3 L with ambulation.  Patient refused additional O2 tanks at home per respiratory therapist.  Patient reported that she have her inogen which goes up to her current o2 required needs.Patient reported intolerance to Trelegy, requested Breztri, prescription written.      Dictation performed with assistance of voice  recognition device therefore transcription errors are possible.        Consultants    None           Surgeries/Procedures:    None               Your medication list        ASK your doctor about these medications        Instructions Last Dose Given Next Dose Due   amLODIPine 2.5 mg tablet  Commonly known as: Norvasc      Take 1 tablet (2.5 mg) by mouth once daily.       citalopram 20 mg tablet  Commonly known as: CeleXA      Take 1 tablet (20 mg) by mouth once daily.       lisinopril 40 mg tablet      Take 1 tablet (40 mg) by mouth once daily.       Trelegy Ellipta 200-62.5-25 mcg blister with device  Generic drug: fluticasone-umeclidin-vilanter      Inhale 1 puff once daily.                   Disposition/additional care plan/ events;      75-year-old  female with notable medical history include but limited to: Hypertension, COPD/emphysema with known history of tobaccoism and alcohol use disorder who presented with shortness of breath.  Chest radiograph on presentation reflected resolution of right basilar infiltrate no new focal lung parenchymal changes appreciated no appreciation of pneumothorax or pleural effusion.  The patient was regulated to supportive care for COPD exacerbation include but limited to supplemental oxygen, systematic corticosteroids and aerosol treatment.  She has responded well to supportive interventions implemented.  Over the last 24 hours patient was able to titrate from 5 L to 2 L.   Elevated blood pressure this morning systolic blood pressure in the 170s, repeat blood pressure later on in the mid 50s, Norvasc escalated to 5 mg daily.The patient requested discharge home today.  She felt that she is back to her baseline state of health.  Home O2 evaluation was done today.  Patient do not wish for supplemental O2 tanks reports that she has Inogen device at home.  Recommend  O2 tank for transport before discharge, discussed with patient's nurse.  Overall patient hospital course.   Uneventful patient responded well to supportive interventions implemented.  Patient should seek medical attention immediately if condition should worsen, new symptoms develop , no further improvement or recurrence of presenting symptomatology, patient warned.Patient was discharged in stable condition.    Follow-up:    Follow -up with family physician within one week.       Discharge Time : 35 mins      Patient should seek medical attention immediately if condition should worsen , presenting symptoms/conditions do not resolve or new symptoms should developed,patient warned.     Dictation performed with assistance of voice recognition device therefore transcription errors are possible.

## 2025-07-24 NOTE — CARE PLAN
The patient's goals for the shift include remain stable    The clinical goals for the shift include remain free from falls and injury and remain on 2 L nc    Over the shift, the patient did not make progress toward the following goals. Barriers to progression include n/a. Recommendations to address these barriers include n/a.

## 2025-07-24 NOTE — HH CARE COORDINATION
Home Care received a Referral for Nursing, Physical Therapy, and Occupational Therapy. We have processed the referral for a Start of Care 7/26/25.     If you have any questions or concerns, please feel free to contact us at [phone]. Follow the prompts, enter your five digit zip code, and you will be directed to your care team on [facility].

## 2025-07-24 NOTE — NURSING NOTE
Pt discharged.  IV and TELE removed.  Instructions provided and understood.  Meds to bed arrived.  Pt going with hospital 02 tank.  Belongings with pt.  Instructions provided and understood.  Questions answered.

## 2025-07-25 ENCOUNTER — PATIENT OUTREACH (OUTPATIENT)
Dept: PRIMARY CARE | Facility: CLINIC | Age: 75
End: 2025-07-25
Payer: MEDICARE

## 2025-07-25 NOTE — PROGRESS NOTES
Discharge facility: [facility]  Discharge diagnosis:  1 acute on chronic hypoxic respiratory failure secondary to COPD exacerbation     2.  COPD exacerbation     3.  Syncope without reoccurrence suspected hypoxia related     4.  Alcohol use disorder, alcohol cessation advised     5.  Tobacco use , tobacco cessation advised     6.  Hypertension       Admission date: 7/21/25  Discharge date: 7/24/25    PCP Appointment Date: Declined to schedule at time of call, message sent to office  Specialist Appointment Date: none noted in EMR  Hospital Encounter and Summary: Linked   ED to Hosp-Admission (Discharged) with Maurice Rivera DO; Felipe Camacho MD (07/21/2025)   See Discharge assessment below for further details    Wrap Up  Wrap Up Additional Comments: Spoke with patient. She denies any shortness of breath or chest pain. patient states she is trying to get some sleep as she did not get much sleep while at the hospital. She declined reviewing her medications but states she has them all and she knows how to take them. She is aware home care is ordered. She declined to schedule appt at time of call as she wanted to get back to sleep. Message sent to office to assist with scheduing follow up appt. (7/25/2025  2:38 PM)    Medications  Medications reviewed with patient/caregiver?: No (Patient declined) (7/25/2025  2:38 PM)  Is the patient having any side effects they believe may be caused by any medication additions or changes?: No (7/25/2025  2:38 PM)  Does the patient have all medications ordered at discharge?: Yes (7/25/2025  2:38 PM)  Care Management Interventions: No intervention needed (7/25/2025  2:38 PM)  Prescription Comments: Prescriptions from  pharmacy for albuterol 90 mcg/actuation inhaler      amLODIPine 5 mg tablet     budesonide-glycopyr-formoterol 160-9-4.8 mcg/actuation HFA aerosol inhaler       doxycycline 100 mg capsule    predniSONE 5 mg tablet     Therems Multivitamin 400  mcg tablet (7/25/2025  2:38 PM)  Is the patient taking all medications as directed (includes completed medication regime)?: Yes (7/25/2025  2:38 PM)  Care Management Interventions: Notified provider (7/25/2025  2:38 PM)  Medication Comments: Patient declined reviewing (7/25/2025  2:38 PM)  Follow Up Tasks: -- (n/a) (7/25/2025  2:38 PM)    Appointments  Does the patient have a primary care provider?: Yes (7/25/2025  2:38 PM)  Care Management Interventions: Educated patient on importance of making appointment (7/25/2025  2:38 PM)  Has the patient kept scheduled appointments due by today?: No (7/25/2025  2:38 PM)  What is preventing the patient from keeping their appointments?: -- (Frequently canceled appts noted in EMR) (7/25/2025  2:38 PM)  Care Management Interventions: -- (n/a) (7/25/2025  2:38 PM)  Follow Up Tasks: -- (n/a) (7/25/2025  2:38 PM)    Self Management  What is the home health agency?:  Home Care (7/25/2025  2:38 PM)  Has home health visited the patient within 72 hours of discharge?: Call prior to 72 hours (7/25/2025  2:38 PM)  Home Health Interventions: -- (n/a) (7/25/2025  2:38 PM)  What Durable Medical Equipment (DME) was ordered?: n/a (7/25/2025  2:38 PM)  Has all Durable Medical Equipment (DME) been delivered?: -- (n/a) (7/25/2025  2:38 PM)  DME Interventions: -- (n/a) (7/25/2025  2:38 PM)  Care Management Interventions: Notified PCP/provider (7/25/2025  2:38 PM)  Follow Up Tasks: -- (n/a) (7/25/2025  2:38 PM)    Patient Teaching  Does the patient have access to their discharge instructions?: Yes (7/25/2025  2:38 PM)  Care Management Interventions: -- (Patient declined) (7/25/2025  2:38 PM)  What is the patient's perception of their health status since discharge?: Improving (7/25/2025  2:38 PM)  Is the patient/caregiver able to teach back the hierarchy of who to call/visit for symptoms/problems? PCP, Specialist, Home Health nurse, Urgent Care, ED, 911: Yes (7/25/2025  2:38 PM)  Patient/Caregiver  Education Comments: Instructed on hospital discharge instructions. Instructed on new and changed medications. Instructed if increased shortness of breath or chest pains to call 911. Provided my contact information and encouraged to call with any questions. (7/25/2025  2:38 PM)

## 2025-07-29 ENCOUNTER — TELEPHONE (OUTPATIENT)
Dept: HOME HEALTH SERVICES | Facility: HOME HEALTH | Age: 75
End: 2025-07-29
Payer: MEDICARE

## 2025-07-29 NOTE — TELEPHONE ENCOUNTER
FYI patient was referred to [facility] for nursing and therapy upon hospital discharge however declined services when contacted for scheduling. Please ensure patient has a hospital follow up scheduled as they will not be monitored by home care.

## 2025-07-30 DIAGNOSIS — J43.9 PULMONARY EMPHYSEMA, UNSPECIFIED EMPHYSEMA TYPE (MULTI): Primary | ICD-10-CM

## 2025-08-01 ENCOUNTER — TELEPHONE (OUTPATIENT)
Dept: PRIMARY CARE | Facility: CLINIC | Age: 75
End: 2025-08-01
Payer: MEDICARE

## 2025-08-04 ENCOUNTER — APPOINTMENT (OUTPATIENT)
Dept: PRIMARY CARE | Facility: CLINIC | Age: 75
End: 2025-08-04
Payer: MEDICARE

## 2025-08-04 ENCOUNTER — APPOINTMENT (OUTPATIENT)
Dept: PHARMACY | Facility: HOSPITAL | Age: 75
End: 2025-08-04
Payer: MEDICARE

## 2025-08-04 VITALS
TEMPERATURE: 98.3 F | HEART RATE: 99 BPM | BODY MASS INDEX: 19.13 KG/M2 | HEIGHT: 66 IN | WEIGHT: 119 LBS | DIASTOLIC BLOOD PRESSURE: 72 MMHG | SYSTOLIC BLOOD PRESSURE: 122 MMHG

## 2025-08-04 DIAGNOSIS — Z09 HOSPITAL DISCHARGE FOLLOW-UP: Primary | ICD-10-CM

## 2025-08-04 DIAGNOSIS — J43.9 PULMONARY EMPHYSEMA, UNSPECIFIED EMPHYSEMA TYPE (MULTI): ICD-10-CM

## 2025-08-04 DIAGNOSIS — I10 PRIMARY HYPERTENSION: ICD-10-CM

## 2025-08-04 PROCEDURE — 1111F DSCHRG MED/CURRENT MED MERGE: CPT | Performed by: INTERNAL MEDICINE

## 2025-08-04 PROCEDURE — 1158F ADVNC CARE PLAN TLK DOCD: CPT | Performed by: INTERNAL MEDICINE

## 2025-08-04 PROCEDURE — 99495 TRANSJ CARE MGMT MOD F2F 14D: CPT | Performed by: INTERNAL MEDICINE

## 2025-08-04 PROCEDURE — 1159F MED LIST DOCD IN RCRD: CPT | Performed by: INTERNAL MEDICINE

## 2025-08-04 PROCEDURE — 3008F BODY MASS INDEX DOCD: CPT | Performed by: INTERNAL MEDICINE

## 2025-08-04 PROCEDURE — 1123F ACP DISCUSS/DSCN MKR DOCD: CPT | Performed by: INTERNAL MEDICINE

## 2025-08-04 PROCEDURE — 3078F DIAST BP <80 MM HG: CPT | Performed by: INTERNAL MEDICINE

## 2025-08-04 PROCEDURE — 3074F SYST BP LT 130 MM HG: CPT | Performed by: INTERNAL MEDICINE

## 2025-08-04 NOTE — PROGRESS NOTES
CP: Krista Pantoja MD  I have reviewed existing histories, notes, past medical history, surgical history, social history, family history, med list, allergy list, and immunization, and updated.  HPI:   hospital Follow up. Was hospitalized after syncope which occurred when she was up to bathroom without her oxygen. She was given supportive treatment for COPD exacerbation, discharged home to oral prednisone, breztri, rescue inhaler. She is feeling better. No diarrhea, or symptoms of UTI. She lost some weight when she was using Trelegy which made her cough, and no appetite is eat. She is better now on breztri.    See summary as below:    +++++++++++++++++++++++++++  Discharge Diagnosis:     1 acute on chronic hypoxic respiratory failure secondary to COPD exacerbation     2.  COPD exacerbation     3.  Syncope without reoccurrence suspected hypoxia related     4.  Alcohol use disorder, alcohol cessation advised     5.  Tobacco use , tobacco cessation advised     6.  Hypertension           History Of Present Illness (HPI):  Rosa Ch is a 75 y.o. female with PMHx s/f COPD and emphysema, daily alcohol use, active tobacco dependence, hypertension, hyperlipidemia presenting with shortness of breath and syncopal episode yesterday.  Patient has been taking Trelegy for an inhaler believe she is having adverse reaction to this with increased mucus buildup.  Last night she had severe episode with intractable coughing was gasping for breath patient did have syncopal episode.  Patient has not been experiencing any fevers or chills no chest pain.  In the emergency department patient workup suggested hypoxia with COPD exacerbation.  Patient did get Solu-Medrol prior to arrival nebulizer treatments with symptomatic improvement.  Case was discussed with the ED provider plan is to admit patient for further workup and evaluation it is unclear if length of stay will need to exceed 2 midnights.     ED Course:   Vitals on presentation:  T 36.7 °C (98.1 °F)  HR (!) 104  BP (!) 184/98  RR 14  O2 (!) 90 % None (Room air)  Labs:   CBC with WBC 8.3, Hgb 21.0, Plts 213.   CMP with glucose 89, Na 138, K 4.1, BUN 6, sCr 0.34, alk phos 71, ALT 20, AST 22, bilirubin 1.4. Magnesium No results found for requested labs within last 365 days..   BNP 46. Trop 4.   Lactate No results found for requested labs within last 365 days.  EKG: Sinus rhythm QTc 463 ms no ST segment to suggest acute ischemia  Imaging - agree with radiology interpretation(s):   Interventions: DuoNeb treatment, normal saline        Decision made to admit the patient to the hospitalist service after evaluation of the patient, review of the above, and discussion with ED provider.      7/22..................[facility] rounding with patient's nurse at bedside.No reported: Seizure activities, syncope, presyncope, dyspnea, fevers, chills.  The patient endorsed dyspnea but improved.        7/23.............[facility] rounding with patient's nurse at bedside.7/22.............. patient's nurse reported patient was a bit tearful this morning.  No reported suicidal ideation, homicidal ideation or depression.  Patient stated that she was just worried about her home.  Overall patient felt that she had improved in comparison to last 24 hours.No reported chest pains, dyspnea at rest, headaches, nausea, vomiting, fevers or chills.  Throughout the day we have been able to titrate patient down from 5 to 2 L of oxyge    Labs:               Lab Results   Component Value Date     GLUCOSE 139 (H) 07/24/2025     CALCIUM 8.3 (L) 07/24/2025      (L) 07/24/2025     K 3.7 07/24/2025     CO2 33 (H) 07/24/2025     CL 98 07/24/2025     BUN 12 07/24/2025     CREATININE 0.25 (L) 07/24/2025                Lab Results   Component Value Date     WBC 5.4 07/22/2025     HGB 17.3 (H) 07/22/2025     HCT 52.3 (H) 07/22/2025      07/22/2025      07/22/2025             X-rays/ Images     [unfilled]   Radiology Results  (last 21 days)     Procedure Component Value Units Date/Time   XR chest 2 views [[id number]] Collected: 07/21/25 0920   Order Status: Completed Updated: 07/21/25 0920   Narrative:     Interpreted By:  Schoenberger, Joseph,  STUDY:  XR CHEST 2 VIEWS;  7/21/2025 9:05 am      INDICATION:  Signs/Symptoms:SOB.          COMPARISON:  01/11/2023      ACCESSION NUMBER(S):  [id number]      ORDERING CLINICIAN:  JESSENIA BYRD      FINDINGS:                  CARDIOMEDIASTINAL SILHOUETTE:  Cardiomediastinal silhouette is normal in size and configuration.      LUNGS:  Right basal infiltrate has resolved. No new focal lung opacity. No  pleural effusion or pneumothorax. Coarsened central lung markings  unchanged.      ABDOMEN:  No remarkable upper abdominal findings.      BONES:  No acute osseous changes.       Impression:     1.  No definite acute findings. See discussion above              MACRO:  None      Signed by: Joseph Schoenberger 7/21/2025 9:19 AM  Dictation workstation:   [id number]   XR elbow left 3+ views [[id number]] Collected: 07/21/25 0922   Order Status: Completed Updated: 07/21/25 0922   Narrative:     Interpreted By:  Schoenberger, Joseph,  STUDY:  XR ELBOW LEFT 3+ VIEWS; ;  7/21/2025 9:05 am      INDICATION:  Signs/Symptoms:fall.          COMPARISON:  None.      ACCESSION NUMBER(S):  [id number]      ORDERING CLINICIAN:  JESSENIA BYRD      FINDINGS:  There is mild enthesopathy at the medial epicondylar origin of the  common flexor tendon. There is no fracture or dislocation. No joint  effusion noted.       Impression:     No acute findings.          MACRO:  None      Signed by: Joseph Schoenberger 7/21/2025 9:21 AM  Dictation workstation:   [id number]             Clinical impression/plan:        75 y.o. female with PMHx s/f COPD and emphysema, daily alcohol use, active tobacco dependence, hypertension, hyperlipidemia presenting with shortness of breath and syncopal episode yesterday.      Acute hypoxic respiratory failure secondary to COPD exacerbation  Patient with significant emphysema by history has been on intermittent oxygen in the past  Substitute Breo and Spiriva for Trelegy patient thinks she had adverse reaction   check magnesium level and supplement if able   7/23...........Continued treatment for acute COPD exacerbation including systematic corticosteroids, moderate to severe COPD exacerbation, empiric antibiotics .  Given current clinical improvement will de-escalate IV dosing with plan for transition to oral taper tomorrow.  Syncope  Appears self-limited due to shortness of breath  Possibly related to alcohol use or coughing  Will check echocardiogram to exclude LV dysfunction or structural abnormality   Will orthostatic vital signs to exclude ongoing orthostatic hypotension      7/23...........No recurrence of syncope, suspicion of hypoxia induced/related syncopal episode.Recent 2D echocardiogram reviewed ejection fraction preserved, no aortic valve stenosis reported.     Concern for adverse reaction to Trelegy  *The patient has increased mucous from trilogy, has tolerated Symbicort in past, consider discharge on  Symbicort and Spiriva to be given separately        History of alcohol use  Continue patient on CIWA protocol supplemental thiamine   7/23..........No clinical signs of alcohol withdrawal apparent at this juncture, symptoms triggered CIWA protocol in place.           History of tobacco dependence   7/23...........Cessation was advised.Nicotine replacement therapy ordered        Hypertension  We will reconcile patient's home medications      7/23...........Continue antihypertensive therapy, given Solu-Medrol use mindful of worsening hypertension, adjust therapy antihypertensive therapy as needed.          Your medication list          ASK your doctor about these medications         Instructions Last Dose Given Next Dose Due   amLODIPine 2.5 mg tablet  Commonly known as:  Norvasc       Take 1 tablet (2.5 mg) by mouth once daily.          citalopram 20 mg tablet  Commonly known as: CeleXA       Take 1 tablet (20 mg) by mouth once daily.          lisinopril 40 mg tablet       Take 1 tablet (40 mg) by mouth once daily.          Trelegy Ellipta 200-62.5-25 mcg blister with device  Generic drug: fluticasone-umeclidin-vilanter       Inhale 1 puff once daily.                          Disposition/additional care plan/ events;        75-year-old  female with notable medical history include but limited to: Hypertension, COPD/emphysema with known history of tobaccoism and alcohol use disorder who presented with shortness of breath.  Chest radiograph on presentation reflected resolution of right basilar infiltrate no new focal lung parenchymal changes appreciated no appreciation of pneumothorax or pleural effusion.  The patient was regulated to supportive care for COPD exacerbation include but limited to supplemental oxygen, systematic corticosteroids and aerosol treatment.  She has responded well to supportive interventions implemented.  Over the last 24 hours patient was able to titrate from 5 L to 2 L.   Elevated blood pressure this morning systolic blood pressure in the 170s, repeat blood pressure later on in the mid 50s, Norvasc escalated to 5 mg daily.The patient requested discharge home today.  She felt that she is back to her baseline state of health.  Home O2 evaluation was done today.  Patient do not wish for supplemental O2 tanks reports that she has Inogen device at home.  Recommend  O2 tank for transport before discharge, discussed with patient's nurse.  Overall patient hospital course.  Uneventful patient responded well to supportive interventions implemented.  Patient should seek medical attention immediately if condition should worsen, new symptoms develop , no further improvement or recurrence of presenting symptomatology, patient warned.Patient was discharged in stable  "condition.     Follow-up:     Follow -up with family physician within one week.     ++++++++++++++++++++++++++++++++++++++++++  Lab Results   Component Value Date    WBC 5.4 07/22/2025    HGB 17.3 (H) 07/22/2025    HCT 52.3 (H) 07/22/2025     07/22/2025    CHOL 258 (H) 06/08/2022    TRIG 147 05/19/2020    HDL 62.3 06/08/2022    LDLDIRECT 168 (H) 06/08/2022    ALT 20 07/21/2025    AST 22 07/21/2025     (L) 07/24/2025    K 3.7 07/24/2025    CL 98 07/24/2025    CREATININE 0.25 (L) 07/24/2025    BUN 12 07/24/2025    CO2 33 (H) 07/24/2025     Physical exam:  /72   Pulse 99   Temp 36.8 °C (98.3 °F)   Ht 1.664 m (5' 5.5\")   Wt 54 kg (119 lb)   BMI 19.50 kg/m²    Pulse ox 98% on 3L O2.   Heart RR  Lungs decreased air entry due to copd  No rales  No Abdominal pain  No leg edema.    Assessments/orders:   Assessment & Plan  Hospital discharge follow-up         Pulmonary emphysema, unspecified emphysema type (Multi)  Exacerbation resolving       Primary hypertension  Amlodipine was increased to 5mg when in hospital. Bp is good today            Keep the Follow up in October.               "

## 2025-08-05 DIAGNOSIS — J44.1 COPD EXACERBATION (MULTI): ICD-10-CM

## 2025-08-05 DIAGNOSIS — I10 PRIMARY HYPERTENSION: ICD-10-CM

## 2025-08-05 RX ORDER — LISINOPRIL 40 MG/1
40 TABLET ORAL DAILY
Qty: 100 TABLET | Refills: 1 | Status: SHIPPED | OUTPATIENT
Start: 2025-08-05

## 2025-08-06 ENCOUNTER — PATIENT OUTREACH (OUTPATIENT)
Dept: PRIMARY CARE | Facility: CLINIC | Age: 75
End: 2025-08-06
Payer: MEDICARE

## 2025-08-06 NOTE — PROGRESS NOTES
Confirmation of at least 2 patient identifiers.    Completed telephonic follow-up with patient after recent visit with PCP  Spoke to patient during outreach call.    Patient reports feeling: Improved    Patient has questions or concerns about medications: No    Have all prescribed medications been filled? Yes    Patient has necessary resources to manage their care? Yes    Patient has questions or concerns? No    Next care management follow-up approximately within one month.  Care  information provided to patient.

## 2025-08-07 LAB
ATRIAL RATE: 91 BPM
ATRIAL RATE: 97 BPM
P AXIS: 71 DEGREES
P AXIS: 74 DEGREES
P OFFSET: 197 MS
P ONSET: 149 MS
PR INTERVAL: 148 MS
PR INTERVAL: 167 MS
Q ONSET: 223 MS
Q ONSET: 249 MS
QRS COUNT: 15 BEATS
QRS COUNT: 16 BEATS
QRS DURATION: 108 MS
QRS DURATION: 70 MS
QT INTERVAL: 364 MS
QT INTERVAL: 376 MS
QTC CALCULATION(BAZETT): 462 MS
QTC CALCULATION(BAZETT): 463 MS
QTC FREDERICIA: 427 MS
QTC FREDERICIA: 432 MS
R AXIS: 76 DEGREES
R AXIS: 87 DEGREES
T AXIS: 70 DEGREES
T AXIS: 72 DEGREES
T OFFSET: 405 MS
T OFFSET: 437 MS
VENTRICULAR RATE: 91 BPM
VENTRICULAR RATE: 97 BPM

## 2025-08-12 PROCEDURE — RXMED WILLOW AMBULATORY MEDICATION CHARGE

## 2025-08-13 ENCOUNTER — PHARMACY VISIT (OUTPATIENT)
Dept: PHARMACY | Facility: CLINIC | Age: 75
End: 2025-08-13
Payer: MEDICARE

## 2025-08-13 ENCOUNTER — APPOINTMENT (OUTPATIENT)
Dept: PHARMACY | Facility: HOSPITAL | Age: 75
End: 2025-08-13
Payer: MEDICARE

## 2025-08-20 PROCEDURE — RXMED WILLOW AMBULATORY MEDICATION CHARGE

## 2025-08-21 ENCOUNTER — PHARMACY VISIT (OUTPATIENT)
Dept: PHARMACY | Facility: CLINIC | Age: 75
End: 2025-08-21
Payer: MEDICARE

## 2025-08-22 ENCOUNTER — APPOINTMENT (OUTPATIENT)
Dept: PHARMACY | Facility: HOSPITAL | Age: 75
End: 2025-08-22
Payer: MEDICARE

## 2025-09-04 ENCOUNTER — PATIENT OUTREACH (OUTPATIENT)
Dept: PRIMARY CARE | Facility: CLINIC | Age: 75
End: 2025-09-04
Payer: MEDICARE

## 2025-10-06 ENCOUNTER — APPOINTMENT (OUTPATIENT)
Dept: PRIMARY CARE | Facility: CLINIC | Age: 75
End: 2025-10-06
Payer: MEDICARE